# Patient Record
Sex: MALE | Race: WHITE | Employment: UNEMPLOYED | URBAN - METROPOLITAN AREA
[De-identification: names, ages, dates, MRNs, and addresses within clinical notes are randomized per-mention and may not be internally consistent; named-entity substitution may affect disease eponyms.]

---

## 2017-03-29 ENCOUNTER — HOSPITAL ENCOUNTER (EMERGENCY)
Age: 62
Discharge: HOME | End: 2017-03-29
Attending: EMERGENCY MEDICINE
Payer: MEDICARE

## 2017-03-29 ENCOUNTER — APPOINTMENT (OUTPATIENT)
Dept: CT IMAGING | Age: 62
End: 2017-03-29
Attending: EMERGENCY MEDICINE
Payer: MEDICARE

## 2017-03-29 VITALS
TEMPERATURE: 98.5 F | WEIGHT: 232 LBS | RESPIRATION RATE: 16 BRPM | SYSTOLIC BLOOD PRESSURE: 143 MMHG | DIASTOLIC BLOOD PRESSURE: 94 MMHG | BODY MASS INDEX: 31.42 KG/M2 | HEIGHT: 72 IN | HEART RATE: 74 BPM | OXYGEN SATURATION: 96 %

## 2017-03-29 DIAGNOSIS — S29.019A THORACIC MYOFASCIAL STRAIN, INITIAL ENCOUNTER: Primary | ICD-10-CM

## 2017-03-29 LAB
ALBUMIN SERPL BCP-MCNC: 4.1 G/DL (ref 3.5–5)
ALBUMIN/GLOB SERPL: 1.1 {RATIO} (ref 1.2–3.5)
ALP SERPL-CCNC: 163 U/L (ref 50–136)
ALT SERPL-CCNC: 66 U/L (ref 12–65)
ANION GAP BLD CALC-SCNC: 11 MMOL/L (ref 7–16)
AST SERPL W P-5'-P-CCNC: 25 U/L (ref 15–37)
BACTERIA URNS QL MICRO: 0 /HPF
BILIRUB SERPL-MCNC: 0.7 MG/DL (ref 0.2–1.1)
BUN SERPL-MCNC: 14 MG/DL (ref 6–23)
CALCIUM SERPL-MCNC: 9.2 MG/DL (ref 8.3–10.4)
CASTS URNS QL MICRO: NORMAL /LPF
CHLORIDE SERPL-SCNC: 106 MMOL/L (ref 98–107)
CO2 SERPL-SCNC: 23 MMOL/L (ref 21–32)
CREAT SERPL-MCNC: 1.06 MG/DL (ref 0.8–1.5)
EPI CELLS #/AREA URNS HPF: NORMAL /HPF
ERYTHROCYTE [DISTWIDTH] IN BLOOD BY AUTOMATED COUNT: 12.8 % (ref 11.9–14.6)
GLOBULIN SER CALC-MCNC: 3.8 G/DL (ref 2.3–3.5)
GLUCOSE SERPL-MCNC: 120 MG/DL (ref 65–100)
HCT VFR BLD AUTO: 51.3 % (ref 41.1–50.3)
HGB BLD-MCNC: 18 G/DL (ref 13.6–17.2)
LIPASE SERPL-CCNC: 153 U/L (ref 73–393)
MCH RBC QN AUTO: 29.4 PG (ref 26.1–32.9)
MCHC RBC AUTO-ENTMCNC: 35.1 G/DL (ref 31.4–35)
MCV RBC AUTO: 83.7 FL (ref 79.6–97.8)
PLATELET # BLD AUTO: 183 K/UL (ref 150–450)
PMV BLD AUTO: 9.2 FL (ref 10.8–14.1)
POTASSIUM SERPL-SCNC: 4.2 MMOL/L (ref 3.5–5.1)
PROT SERPL-MCNC: 7.9 G/DL (ref 6.3–8.2)
RBC # BLD AUTO: 6.13 M/UL (ref 4.23–5.67)
RBC #/AREA URNS HPF: NORMAL /HPF
SODIUM SERPL-SCNC: 140 MMOL/L (ref 136–145)
WBC # BLD AUTO: 9.3 K/UL (ref 4.3–11.1)
WBC URNS QL MICRO: NORMAL /HPF

## 2017-03-29 PROCEDURE — 80053 COMPREHEN METABOLIC PANEL: CPT | Performed by: EMERGENCY MEDICINE

## 2017-03-29 PROCEDURE — 96361 HYDRATE IV INFUSION ADD-ON: CPT | Performed by: EMERGENCY MEDICINE

## 2017-03-29 PROCEDURE — 96374 THER/PROPH/DIAG INJ IV PUSH: CPT | Performed by: EMERGENCY MEDICINE

## 2017-03-29 PROCEDURE — 81015 MICROSCOPIC EXAM OF URINE: CPT | Performed by: EMERGENCY MEDICINE

## 2017-03-29 PROCEDURE — 74011636320 HC RX REV CODE- 636/320: Performed by: EMERGENCY MEDICINE

## 2017-03-29 PROCEDURE — 96375 TX/PRO/DX INJ NEW DRUG ADDON: CPT | Performed by: EMERGENCY MEDICINE

## 2017-03-29 PROCEDURE — 85027 COMPLETE CBC AUTOMATED: CPT | Performed by: EMERGENCY MEDICINE

## 2017-03-29 PROCEDURE — 99284 EMERGENCY DEPT VISIT MOD MDM: CPT | Performed by: EMERGENCY MEDICINE

## 2017-03-29 PROCEDURE — 83690 ASSAY OF LIPASE: CPT | Performed by: EMERGENCY MEDICINE

## 2017-03-29 PROCEDURE — 74011000258 HC RX REV CODE- 258: Performed by: EMERGENCY MEDICINE

## 2017-03-29 PROCEDURE — 74011250636 HC RX REV CODE- 250/636: Performed by: EMERGENCY MEDICINE

## 2017-03-29 PROCEDURE — 81003 URINALYSIS AUTO W/O SCOPE: CPT | Performed by: EMERGENCY MEDICINE

## 2017-03-29 PROCEDURE — 74177 CT ABD & PELVIS W/CONTRAST: CPT

## 2017-03-29 RX ORDER — ONDANSETRON 2 MG/ML
4 INJECTION INTRAMUSCULAR; INTRAVENOUS
Status: COMPLETED | OUTPATIENT
Start: 2017-03-29 | End: 2017-03-29

## 2017-03-29 RX ORDER — HYDROMORPHONE HYDROCHLORIDE 1 MG/ML
1 INJECTION, SOLUTION INTRAMUSCULAR; INTRAVENOUS; SUBCUTANEOUS
Status: COMPLETED | OUTPATIENT
Start: 2017-03-29 | End: 2017-03-29

## 2017-03-29 RX ORDER — SODIUM CHLORIDE 0.9 % (FLUSH) 0.9 %
10 SYRINGE (ML) INJECTION
Status: COMPLETED | OUTPATIENT
Start: 2017-03-29 | End: 2017-03-29

## 2017-03-29 RX ADMIN — ONDANSETRON 4 MG: 2 INJECTION INTRAMUSCULAR; INTRAVENOUS at 12:06

## 2017-03-29 RX ADMIN — HYDROMORPHONE HYDROCHLORIDE 1 MG: 1 INJECTION, SOLUTION INTRAMUSCULAR; INTRAVENOUS; SUBCUTANEOUS at 12:03

## 2017-03-29 RX ADMIN — SODIUM CHLORIDE 100 ML: 900 INJECTION, SOLUTION INTRAVENOUS at 12:12

## 2017-03-29 RX ADMIN — SODIUM CHLORIDE 1000 ML: 900 INJECTION, SOLUTION INTRAVENOUS at 10:45

## 2017-03-29 RX ADMIN — IOPAMIDOL 100 ML: 755 INJECTION, SOLUTION INTRAVENOUS at 12:12

## 2017-03-29 RX ADMIN — Medication 10 ML: at 12:12

## 2017-03-29 NOTE — PROGRESS NOTES
Please call [email or web address] with creatnine result & once  IV access is obtained so that we may proceed with scan.

## 2017-03-29 NOTE — ED NOTES
Pt awaiting CT exam at this time pt continues to complain of 10/10 pain requesting pain meds at this time.  Will notify md

## 2017-03-29 NOTE — ED NOTES
Pt returned from CT states improvement of pain. Pt states pain is now 3/10 at present time. PT  bp is 143/87 76  20  95% on room air.  Awaiting CT Results at this time

## 2017-03-29 NOTE — ED TRIAGE NOTES
Pt reports that 2 weeks ago he had a horse go down and he was pulling on her trying to get her up. After that incident the pt started to have lower right flank pain. Pt denies any difficulty with urination and defecation.

## 2017-03-29 NOTE — ED NOTES
Pt medicated per md order for pain 10/10. Pt to CT after medication administration pt on monitor bp 168/102 pulse 86 02 sat 94% on room air.

## 2018-07-19 ENCOUNTER — HOSPITAL ENCOUNTER (OUTPATIENT)
Dept: LAB | Age: 63
Discharge: HOME | End: 2018-07-19
Payer: MEDICARE

## 2018-07-19 DIAGNOSIS — I25.10 CORONARY ARTERY DISEASE INVOLVING NATIVE CORONARY ARTERY OF NATIVE HEART WITHOUT ANGINA PECTORIS: Chronic | ICD-10-CM

## 2018-07-19 DIAGNOSIS — E78.5 HYPERLIPIDEMIA, UNSPECIFIED HYPERLIPIDEMIA TYPE: Chronic | ICD-10-CM

## 2018-07-19 LAB
ALBUMIN SERPL-MCNC: 3.9 G/DL (ref 3.2–4.6)
ALBUMIN/GLOB SERPL: 1.1 {RATIO}
ALP SERPL-CCNC: 112 U/L (ref 50–136)
ALT SERPL-CCNC: 33 U/L (ref 12–65)
AST SERPL-CCNC: 18 U/L (ref 15–37)
BILIRUB DIRECT SERPL-MCNC: <0.1 MG/DL
BILIRUB SERPL-MCNC: 0.4 MG/DL (ref 0.2–1.1)
CHOLEST SERPL-MCNC: 286 MG/DL
GLOBULIN SER CALC-MCNC: 3.4 G/DL
HDLC SERPL-MCNC: 21 MG/DL (ref 40–60)
HDLC SERPL: 13.6 {RATIO}
LDLC SERPL CALC-MCNC: ABNORMAL MG/DL
LDLC SERPL DIRECT ASSAY-MCNC: 114 MG/DL
LIPID PROFILE,FLP: ABNORMAL
PROT SERPL-MCNC: 7.3 G/DL (ref 6.3–8.2)
TRIGL SERPL-MCNC: >400 MG/DL (ref 35–150)
VLDLC SERPL CALC-MCNC: ABNORMAL MG/DL (ref 6–23)

## 2018-07-19 PROCEDURE — 83721 ASSAY OF BLOOD LIPOPROTEIN: CPT | Performed by: INTERNAL MEDICINE

## 2018-07-19 PROCEDURE — 80061 LIPID PANEL: CPT | Performed by: INTERNAL MEDICINE

## 2018-07-19 PROCEDURE — 80076 HEPATIC FUNCTION PANEL: CPT | Performed by: INTERNAL MEDICINE

## 2018-07-19 PROCEDURE — 36415 COLL VENOUS BLD VENIPUNCTURE: CPT | Performed by: INTERNAL MEDICINE

## 2018-08-06 ENCOUNTER — APPOINTMENT (OUTPATIENT)
Dept: CT IMAGING | Age: 63
End: 2018-08-06
Attending: EMERGENCY MEDICINE
Payer: MEDICARE

## 2018-08-06 ENCOUNTER — HOSPITAL ENCOUNTER (EMERGENCY)
Age: 63
Discharge: HOME | End: 2018-08-06
Attending: EMERGENCY MEDICINE
Payer: MEDICARE

## 2018-08-06 VITALS
TEMPERATURE: 97.9 F | SYSTOLIC BLOOD PRESSURE: 138 MMHG | BODY MASS INDEX: 30.88 KG/M2 | WEIGHT: 228 LBS | HEART RATE: 68 BPM | DIASTOLIC BLOOD PRESSURE: 90 MMHG | RESPIRATION RATE: 26 BRPM | HEIGHT: 72 IN | OXYGEN SATURATION: 87 %

## 2018-08-06 DIAGNOSIS — N20.0 KIDNEY STONE: Primary | ICD-10-CM

## 2018-08-06 LAB
ALBUMIN SERPL-MCNC: 3.7 G/DL (ref 3.2–4.6)
ALBUMIN/GLOB SERPL: 0.9 {RATIO} (ref 1.2–3.5)
ALP SERPL-CCNC: 128 U/L (ref 50–136)
ALT SERPL-CCNC: 52 U/L (ref 12–65)
ANION GAP SERPL CALC-SCNC: 10 MMOL/L (ref 7–16)
AST SERPL-CCNC: 50 U/L (ref 15–37)
BACTERIA URNS QL MICRO: 0 /HPF
BASOPHILS # BLD: 0 K/UL (ref 0–0.2)
BASOPHILS NFR BLD: 1 % (ref 0–2)
BILIRUB SERPL-MCNC: 0.4 MG/DL (ref 0.2–1.1)
BUN SERPL-MCNC: 16 MG/DL (ref 8–23)
CALCIUM SERPL-MCNC: 9.1 MG/DL (ref 8.3–10.4)
CASTS URNS QL MICRO: 0 /LPF
CHLORIDE SERPL-SCNC: 109 MMOL/L (ref 98–107)
CO2 SERPL-SCNC: 25 MMOL/L (ref 21–32)
CREAT SERPL-MCNC: 1.29 MG/DL (ref 0.8–1.5)
CRYSTALS URNS QL MICRO: 0 /LPF
DIFFERENTIAL METHOD BLD: ABNORMAL
EOSINOPHIL # BLD: 0.3 K/UL (ref 0–0.8)
EOSINOPHIL NFR BLD: 4 % (ref 0.5–7.8)
EPI CELLS #/AREA URNS HPF: NORMAL /HPF
ERYTHROCYTE [DISTWIDTH] IN BLOOD BY AUTOMATED COUNT: 13 % (ref 11.9–14.6)
GLOBULIN SER CALC-MCNC: 4 G/DL (ref 2.3–3.5)
GLUCOSE SERPL-MCNC: 121 MG/DL (ref 65–100)
HCT VFR BLD AUTO: 49.7 % (ref 41.1–50.3)
HGB BLD-MCNC: 17.5 G/DL (ref 13.6–17.2)
IMM GRANULOCYTES # BLD: 0 K/UL (ref 0–0.5)
IMM GRANULOCYTES NFR BLD AUTO: 0 % (ref 0–5)
LYMPHOCYTES # BLD: 2.2 K/UL (ref 0.5–4.6)
LYMPHOCYTES NFR BLD: 28 % (ref 13–44)
MCH RBC QN AUTO: 30.6 PG (ref 26.1–32.9)
MCHC RBC AUTO-ENTMCNC: 35.2 G/DL (ref 31.4–35)
MCV RBC AUTO: 86.9 FL (ref 79.6–97.8)
MONOCYTES # BLD: 0.4 K/UL (ref 0.1–1.3)
MONOCYTES NFR BLD: 6 % (ref 4–12)
MUCOUS THREADS URNS QL MICRO: 0 /LPF
NEUTS SEG # BLD: 4.9 K/UL (ref 1.7–8.2)
NEUTS SEG NFR BLD: 61 % (ref 43–78)
OTHER OBSERVATIONS,UCOM: NORMAL
PLATELET # BLD AUTO: 189 K/UL (ref 150–450)
PMV BLD AUTO: 9.5 FL (ref 10.8–14.1)
POTASSIUM SERPL-SCNC: 4.7 MMOL/L (ref 3.5–5.1)
PROT SERPL-MCNC: 7.7 G/DL (ref 6.3–8.2)
RBC # BLD AUTO: 5.72 M/UL (ref 4.23–5.67)
RBC #/AREA URNS HPF: >100 /HPF
SODIUM SERPL-SCNC: 144 MMOL/L (ref 136–145)
WBC # BLD AUTO: 7.8 K/UL (ref 4.3–11.1)
WBC URNS QL MICRO: NORMAL /HPF

## 2018-08-06 PROCEDURE — 80053 COMPREHEN METABOLIC PANEL: CPT

## 2018-08-06 PROCEDURE — 74176 CT ABD & PELVIS W/O CONTRAST: CPT

## 2018-08-06 PROCEDURE — 96374 THER/PROPH/DIAG INJ IV PUSH: CPT | Performed by: EMERGENCY MEDICINE

## 2018-08-06 PROCEDURE — 74011250636 HC RX REV CODE- 250/636: Performed by: EMERGENCY MEDICINE

## 2018-08-06 PROCEDURE — 81015 MICROSCOPIC EXAM OF URINE: CPT

## 2018-08-06 PROCEDURE — 99284 EMERGENCY DEPT VISIT MOD MDM: CPT | Performed by: EMERGENCY MEDICINE

## 2018-08-06 PROCEDURE — 96375 TX/PRO/DX INJ NEW DRUG ADDON: CPT | Performed by: EMERGENCY MEDICINE

## 2018-08-06 PROCEDURE — 85025 COMPLETE CBC W/AUTO DIFF WBC: CPT

## 2018-08-06 RX ORDER — MORPHINE SULFATE 2 MG/ML
2 INJECTION, SOLUTION INTRAMUSCULAR; INTRAVENOUS
Status: COMPLETED | OUTPATIENT
Start: 2018-08-06 | End: 2018-08-06

## 2018-08-06 RX ORDER — HYDROMORPHONE HYDROCHLORIDE 2 MG/ML
1 INJECTION, SOLUTION INTRAMUSCULAR; INTRAVENOUS; SUBCUTANEOUS ONCE
Status: COMPLETED | OUTPATIENT
Start: 2018-08-06 | End: 2018-08-06

## 2018-08-06 RX ORDER — ONDANSETRON 2 MG/ML
4 INJECTION INTRAMUSCULAR; INTRAVENOUS
Status: COMPLETED | OUTPATIENT
Start: 2018-08-06 | End: 2018-08-06

## 2018-08-06 RX ORDER — ONDANSETRON 4 MG/1
4 TABLET, FILM COATED ORAL
Qty: 12 TAB | Refills: 0 | Status: SHIPPED | OUTPATIENT
Start: 2018-08-06 | End: 2018-09-07

## 2018-08-06 RX ORDER — KETOROLAC TROMETHAMINE 30 MG/ML
15 INJECTION, SOLUTION INTRAMUSCULAR; INTRAVENOUS
Status: COMPLETED | OUTPATIENT
Start: 2018-08-06 | End: 2018-08-06

## 2018-08-06 RX ADMIN — MORPHINE SULFATE 2 MG: 2 INJECTION, SOLUTION INTRAMUSCULAR; INTRAVENOUS at 19:37

## 2018-08-06 RX ADMIN — KETOROLAC TROMETHAMINE 15 MG: 30 INJECTION, SOLUTION INTRAMUSCULAR at 19:37

## 2018-08-06 RX ADMIN — HYDROMORPHONE HYDROCHLORIDE 1 MG: 2 INJECTION, SOLUTION INTRAMUSCULAR; INTRAVENOUS; SUBCUTANEOUS at 20:25

## 2018-08-06 RX ADMIN — ONDANSETRON 4 MG: 2 INJECTION INTRAMUSCULAR; INTRAVENOUS at 19:37

## 2018-08-06 NOTE — ED TRIAGE NOTES
Pt states having sudden onset of lower back, left flank pain and blood in his urine that started 3 hours ago.

## 2018-08-07 NOTE — DISCHARGE INSTRUCTIONS
Kidney Stone: Care Instructions  Your Care Instructions    Kidney stones are formed when salts, minerals, and other substances normally found in the urine clump together. They can be as small as grains of sand or, rarely, as large as golf balls. While the stone is traveling through the ureter, which is the tube that carries urine from the kidney to the bladder, you will probably feel pain. The pain may be mild or very severe. You may also have some blood in your urine. As soon as the stone reaches the bladder, any intense pain should go away. If a stone is too large to pass on its own, you may need a medical procedure to help you pass the stone. The doctor has checked you carefully, but problems can develop later. If you notice any problems or new symptoms, get medical treatment right away. Follow-up care is a key part of your treatment and safety. Be sure to make and go to all appointments, and call your doctor if you are having problems. It's also a good idea to know your test results and keep a list of the medicines you take. How can you care for yourself at home? · Drink plenty of fluids, enough so that your urine is light yellow or clear like water. If you have kidney, heart, or liver disease and have to limit fluids, talk with your doctor before you increase the amount of fluids you drink. · Take pain medicines exactly as directed. Call your doctor if you think you are having a problem with your medicine. ¨ If the doctor gave you a prescription medicine for pain, take it as prescribed. ¨ If you are not taking a prescription pain medicine, ask your doctor if you can take an over-the-counter medicine. Read and follow all instructions on the label. · Your doctor may ask you to strain your urine so that you can collect your kidney stone when it passes. You can use a kitchen strainer or a tea strainer to catch the stone. Store it in a plastic bag until you see your doctor again.   Preventing future kidney stones  Some changes in your diet may help prevent kidney stones. Depending on the cause of your stones, your doctor may recommend that you:  · Drink plenty of fluids, enough so that your urine is light yellow or clear like water. If you have kidney, heart, or liver disease and have to limit fluids, talk with your doctor before you increase the amount of fluids you drink. · Limit coffee, tea, and alcohol. Also avoid grapefruit juice. · Do not take more than the recommended daily dose of vitamins C and D.  · Avoid antacids such as Gaviscon, Maalox, Mylanta, or Tums. · Limit the amount of salt (sodium) in your diet. · Eat a balanced diet that is not too high in protein. · Limit foods that are high in a substance called oxalate, which can cause kidney stones. These foods include dark green vegetables, rhubarb, chocolate, wheat bran, nuts, cranberries, and beans. When should you call for help? Call your doctor now or seek immediate medical care if:    · You cannot keep down fluids.     · Your pain gets worse.     · You have a fever or chills.     · You have new or worse pain in your back just below your rib cage (the flank area).     · You have new or more blood in your urine.    Watch closely for changes in your health, and be sure to contact your doctor if:    · You do not get better as expected. Where can you learn more? Go to http://elda-brea.info/. Enter Z843 in the search box to learn more about \"Kidney Stone: Care Instructions. \"  Current as of: May 12, 2017  Content Version: 11.7  © 0492-9825 [employer]. Care instructions adapted under license by [employer] (which disclaims liability or warranty for this information). If you have questions about a medical condition or this instruction, always ask your healthcare professional. [address] any warranty or liability for your use of this information.          Learning About Diet for Kidney Stone Prevention  What are kidney stones? Kidney stones are made of salts and minerals in the urine that form small \"elisa. \" Stones can form in the kidneys and the ureters (the tubes that lead from the kidneys to the bladder). They can also form in the bladder. Stones may not cause a problem as long as they stay in the kidneys. But they can cause sudden, severe pain. Pain is most likely when the stones travel from the kidneys to the bladder. Kidney stones can cause bloody urine. Kidney stones often run in families. You are more likely to get them if you don't drink enough fluids, mainly water. Certain foods and drinks and some dietary supplements may also increase your risk for kidney stones if you consume too much of them. What can you do to prevent kidney stones? Changing what you eat may not prevent all types of kidney stones. But for people who have a history of certain kinds of kidney stones, some changes in diet may help. A dietitian can help you set up a meal plan that includes healthy, low-oxalate choices. Here are some general guidelines to get you started. Plan your meals and snacks around foods that are low in oxalate. These foods include:  · Corn, kale, parsnips, and squash,. · Beef, chicken, pork, turkey, and fish. · Milk, butter, cheese, and yogurt. You can eat certain foods that are medium-high in oxalate, but eat them only once in a while. These foods include:  · Bread. · Brown rice. · English muffins. · Figs. · Popcorn. · String beans. · Tomatoes. Limit very high-oxalate foods, including:  · Black tea. · Coffee. · Chocolate. · Dark green vegetables. · Nuts. Here are some other things you can do to help prevent kidney stones. · Drink plenty of fluids. If you have kidney, heart, or liver disease and have to limit fluids, talk with your doctor before you increase the amount of fluids you drink.   · Do not take more than the recommended daily dose of vitamins C and D.  · Limit the salt in your diet. · Eat a balanced diet that is not too high in protein. Follow-up care is a key part of your treatment and safety. Be sure to make and go to all appointments, and call your doctor if you are having problems. It's also a good idea to know your test results and keep a list of the medicines you take. Where can you learn more? Go to http://elda-brea.info/. Enter C138 in the search box to learn more about \"Learning About Diet for Kidney Stone Prevention. \"  Current as of: May 12, 2017  Content Version: 11.7  © 5207-1793 [employer]. Care instructions adapted under license by [employer] (which disclaims liability or warranty for this information). If you have questions about a medical condition or this instruction, always ask your healthcare professional. [address] any warranty or liability for your use of this information.

## 2018-08-07 NOTE — ED NOTES
I have reviewed discharge instructions with the patient. The patient verbalized understanding. Patient left ED via Discharge Method: ambulatory to Home with family. Opportunity for questions and clarification provided. Patient given 1 scripts. To continue your aftercare when you leave the hospital, you may receive an automated call from our care team to check in on how you are doing. This is a free service and part of our promise to provide the best care and service to meet your aftercare needs.  If you have questions, or wish to unsubscribe from this service please call [phone]. Thank you for Choosing our [facility].

## 2018-08-08 ENCOUNTER — ANESTHESIA EVENT (OUTPATIENT)
Dept: SURGERY | Age: 63
End: 2018-08-08
Payer: MEDICARE

## 2018-08-08 ENCOUNTER — HOSPITAL ENCOUNTER (OUTPATIENT)
Age: 63
Setting detail: OBSERVATION
LOS: 1 days | Discharge: HOME | End: 2018-08-10
Attending: UROLOGY | Admitting: UROLOGY
Payer: MEDICARE

## 2018-08-08 PROBLEM — N20.1 LEFT URETERAL STONE: Status: ACTIVE | Noted: 2018-08-08

## 2018-08-08 PROCEDURE — 96366 THER/PROPH/DIAG IV INF ADDON: CPT

## 2018-08-08 PROCEDURE — 74011250637 HC RX REV CODE- 250/637: Performed by: NURSE PRACTITIONER

## 2018-08-08 PROCEDURE — 96376 TX/PRO/DX INJ SAME DRUG ADON: CPT

## 2018-08-08 PROCEDURE — 77030020245 HC SOL INJ 5% D/0.9%NACL

## 2018-08-08 PROCEDURE — 77030019938 HC TBNG IV PCA ICUM -A

## 2018-08-08 PROCEDURE — 74011250637 HC RX REV CODE- 250/637: Performed by: UROLOGY

## 2018-08-08 PROCEDURE — 77030020253 HC SOL INJ D545NS .05 DEX .45 SAL

## 2018-08-08 PROCEDURE — 65270000029 HC RM PRIVATE

## 2018-08-08 PROCEDURE — 77030032490 HC SLV COMPR SCD KNE COVD -B

## 2018-08-08 PROCEDURE — 74011250636 HC RX REV CODE- 250/636: Performed by: UROLOGY

## 2018-08-08 PROCEDURE — 99218 HC RM OBSERVATION: CPT

## 2018-08-08 PROCEDURE — 96365 THER/PROPH/DIAG IV INF INIT: CPT

## 2018-08-08 PROCEDURE — 74011250636 HC RX REV CODE- 250/636: Performed by: NURSE PRACTITIONER

## 2018-08-08 PROCEDURE — 96375 TX/PRO/DX INJ NEW DRUG ADDON: CPT

## 2018-08-08 RX ORDER — SODIUM CHLORIDE 0.9 % (FLUSH) 0.9 %
5-10 SYRINGE (ML) INJECTION EVERY 8 HOURS
Status: DISCONTINUED | OUTPATIENT
Start: 2018-08-08 | End: 2018-08-10 | Stop reason: HOSPADM

## 2018-08-08 RX ORDER — SIMETHICONE 80 MG
80 TABLET,CHEWABLE ORAL
Status: DISCONTINUED | OUTPATIENT
Start: 2018-08-08 | End: 2018-08-10 | Stop reason: HOSPADM

## 2018-08-08 RX ORDER — ACETAMINOPHEN 325 MG/1
650 TABLET ORAL
Status: DISCONTINUED | OUTPATIENT
Start: 2018-08-08 | End: 2018-08-10 | Stop reason: HOSPADM

## 2018-08-08 RX ORDER — TAMSULOSIN HYDROCHLORIDE 0.4 MG/1
0.4 CAPSULE ORAL DAILY
Status: DISCONTINUED | OUTPATIENT
Start: 2018-08-08 | End: 2018-08-10 | Stop reason: HOSPADM

## 2018-08-08 RX ORDER — SODIUM CHLORIDE 9 MG/ML
125 INJECTION, SOLUTION INTRAVENOUS CONTINUOUS
Status: DISCONTINUED | OUTPATIENT
Start: 2018-08-08 | End: 2018-08-10 | Stop reason: HOSPADM

## 2018-08-08 RX ORDER — HYDROCODONE BITARTRATE AND ACETAMINOPHEN 5; 325 MG/1; MG/1
1 TABLET ORAL
Status: DISCONTINUED | OUTPATIENT
Start: 2018-08-08 | End: 2018-08-08

## 2018-08-08 RX ORDER — HYDROMORPHONE HYDROCHLORIDE 2 MG/ML
1 INJECTION, SOLUTION INTRAMUSCULAR; INTRAVENOUS; SUBCUTANEOUS
Status: DISCONTINUED | OUTPATIENT
Start: 2018-08-08 | End: 2018-08-08

## 2018-08-08 RX ORDER — DIPHENHYDRAMINE HCL 25 MG
25 CAPSULE ORAL
Status: DISCONTINUED | OUTPATIENT
Start: 2018-08-08 | End: 2018-08-10 | Stop reason: HOSPADM

## 2018-08-08 RX ORDER — MORPHINE SULFATE 5 MG/ML
INJECTION, SOLUTION INTRAVENOUS CONTINUOUS
Status: DISCONTINUED | OUTPATIENT
Start: 2018-08-08 | End: 2018-08-10

## 2018-08-08 RX ORDER — CEFAZOLIN SODIUM/WATER 2 G/20 ML
2 SYRINGE (ML) INTRAVENOUS
Status: COMPLETED | OUTPATIENT
Start: 2018-08-09 | End: 2018-08-09

## 2018-08-08 RX ORDER — NALOXONE HYDROCHLORIDE 0.4 MG/ML
0.4 INJECTION, SOLUTION INTRAMUSCULAR; INTRAVENOUS; SUBCUTANEOUS AS NEEDED
Status: DISCONTINUED | OUTPATIENT
Start: 2018-08-08 | End: 2018-08-10 | Stop reason: HOSPADM

## 2018-08-08 RX ORDER — ONDANSETRON 2 MG/ML
4 INJECTION INTRAMUSCULAR; INTRAVENOUS
Status: DISCONTINUED | OUTPATIENT
Start: 2018-08-08 | End: 2018-08-10 | Stop reason: HOSPADM

## 2018-08-08 RX ORDER — SODIUM CHLORIDE 0.9 % (FLUSH) 0.9 %
5-10 SYRINGE (ML) INJECTION AS NEEDED
Status: DISCONTINUED | OUTPATIENT
Start: 2018-08-08 | End: 2018-08-10 | Stop reason: HOSPADM

## 2018-08-08 RX ADMIN — ONDANSETRON 4 MG: 2 INJECTION, SOLUTION INTRAMUSCULAR; INTRAVENOUS at 12:32

## 2018-08-08 RX ADMIN — SIMETHICONE CHEW TAB 80 MG 80 MG: 80 TABLET ORAL at 20:53

## 2018-08-08 RX ADMIN — ONDANSETRON 4 MG: 2 INJECTION, SOLUTION INTRAMUSCULAR; INTRAVENOUS at 18:28

## 2018-08-08 RX ADMIN — SODIUM CHLORIDE 125 ML/HR: 900 INJECTION, SOLUTION INTRAVENOUS at 19:24

## 2018-08-08 RX ADMIN — TAMSULOSIN HYDROCHLORIDE 0.4 MG: 0.4 CAPSULE ORAL at 12:32

## 2018-08-08 RX ADMIN — HYDROMORPHONE HYDROCHLORIDE 1 MG: 2 INJECTION, SOLUTION INTRAMUSCULAR; INTRAVENOUS; SUBCUTANEOUS at 12:29

## 2018-08-08 RX ADMIN — MORPHINE SULFATE: 5 INJECTION, SOLUTION INTRAVENOUS at 19:39

## 2018-08-08 RX ADMIN — Medication 10 ML: at 16:18

## 2018-08-08 RX ADMIN — HYDROCODONE BITARTRATE AND ACETAMINOPHEN 1 TABLET: 5; 325 TABLET ORAL at 18:28

## 2018-08-08 RX ADMIN — HYDROMORPHONE HYDROCHLORIDE 1 MG: 2 INJECTION, SOLUTION INTRAMUSCULAR; INTRAVENOUS; SUBCUTANEOUS at 16:37

## 2018-08-08 RX ADMIN — SODIUM CHLORIDE 125 ML/HR: 900 INJECTION, SOLUTION INTRAVENOUS at 11:03

## 2018-08-08 RX ADMIN — Medication 10 ML: at 19:45

## 2018-08-08 RX ADMIN — HYDROMORPHONE HYDROCHLORIDE 1 MG: 2 INJECTION, SOLUTION INTRAMUSCULAR; INTRAVENOUS; SUBCUTANEOUS at 11:19

## 2018-08-08 RX ADMIN — HYDROCODONE BITARTRATE AND ACETAMINOPHEN 1 TABLET: 5; 325 TABLET ORAL at 11:03

## 2018-08-08 NOTE — IP AVS SNAPSHOT
[address] 
[phone] Patient: Daniel Borges MRN: [id number] About your hospitalization You were admitted on:  August 8, 2018 You last received care in the:  [facility] 6 MED SURG You were discharged on:  August 10, 2018 Why you were hospitalized Your primary diagnosis was:  Not on File Your diagnoses also included:  Left Ureteral Stone Follow-up Information Follow up With Details Comments Contact Info Annette Hayes MD   [address] [facility] [address] 
[phone] Izabel Gomez MD  office will call patient with appointment date and time. [facility] [address] [address] 
[phone] Your Scheduled Appointments Monday August 20, 2018 10:00 AM EDT Aorta and Arterial Ultrasound with [facility] ULTR[facility]OUND [facility] ([facility]) [address] [address] [phone] Monday September 10, 2018  9:45 AM EDT  
ESTABLISHED PATIENT with [facility]SA[facility] PROVIDER [facility] ([facility]) [address] [address] [phone] Discharge Orders None A check luz indicates which time of day the medication should be taken. My Medications START taking these medications Instructions Each Dose to Equal  
 Morning Noon Evening Bedtime  
 phenazopyridine 100 mg tablet Commonly known as:  PYRIDIUM Your next dose is:  As needed Take 1 Tab by mouth three (3) times daily as needed for Pain for up to 3 days. 100 mg CHANGE how you take these medications Instructions Each Dose to Equal  
 Morning Noon Evening Bedtime HYDROcodone-acetaminophen  mg tablet Commonly known as:  Florence Geiger  
 What changed:  Another medication with the same name was removed. Continue taking this medication, and follow the directions you see here. Your last dose was: As needed Take 1 Tab by mouth every four (4) hours as needed. Max Daily Amount: 6 Tabs. 1 Tab CONTINUE taking these medications Instructions Each Dose to Equal  
 Morning Noon Evening Bedtime  
 acetaminophen 500 mg tablet Commonly known as:  TYLENOL Your next dose is:  As needed Take  by mouth every six (6) hours as needed for Pain. Allergy 25 mg capsule Generic drug:  diphenhydrAMINE Your next dose is:  As needed Take 25 mg by mouth every six (6) hours as needed. 25 mg  
    
   
   
   
  
 aspirin delayed-release 81 mg tablet Your next dose is:  8-11 Take  by mouth daily. atorvastatin 80 mg tablet Commonly known as:  LIPITOR Your next dose is:  bedtime Take 1 Tab by mouth daily. 80 mg  
    
   
   
   
  
  
 carvedilol 6.25 mg tablet Commonly known as:  Chris Liter Your next dose is: This evening Take 1 Tab by mouth two (2) times daily (with meals). 6.25 mg  
    
   
   
  
   
  
 clopidogrel 75 mg Tab Commonly known as:  PLAVIX Your next dose is:  8-11 Take 1 Tab by mouth daily. 75 mg  
    
   
   
   
  
 diazePAM 5 mg tablet Commonly known as:  VALIUM Your next dose is:  As needed Take 5 mg by mouth every twelve (12) hours as needed. 5 mg  
    
   
   
   
  
 dicyclomine 10 mg capsule Commonly known as:  BENTYL Your next dose is:  As needed  
   
      
   
   
   
  
 lisinopril 5 mg tablet Commonly known as:  Merilynn Reinaldo Your next dose is:  8-11 Take  by mouth daily. NARCAN 4 mg/actuation nasal spray Generic drug:  naloxone Your next dose is:  As needed NITROSTAT 0.4 mg SL tablet Generic drug:  nitroglycerin Your next dose is:  As needed  
   
      
   
   
   
  
 omeprazole 40 mg capsule Commonly known as:  PRILOSEC Your next dose is:  8-11 Take 40 mg by mouth daily. 40 mg  
    
   
   
   
  
 ondansetron hcl 4 mg tablet Commonly known as:  Negrita [place] Your next dose is:  As needed Take 1 Tab by mouth every eight (8) hours as needed for Nausea. 4 mg  
    
   
   
   
  
 pravastatin 20 mg tablet Commonly known as:  PRAVACHOL Your next dose is:  As directed  
   
      
   
   
   
  
 zolpidem 10 mg tablet Commonly known as:  AMBIEN Your next dose is:  As needed Take 10 mg by mouth nightly as needed. 10 mg Where to Get Your Medications Information on where to get these meds will be given to you by the nurse or doctor. ! Ask your nurse or doctor about these medications  
  phenazopyridine 100 mg tablet Opioid Education Prescription Opioids: What You Need to Know: 
 
Prescription opioids can be used to help relieve moderate-to-severe pain and are often prescribed following a surgery or injury, or for certain health conditions. These medications can be an important part of treatment but also come with serious risks. Opioids are strong pain medicines. Examples include hydrocodone, oxycodone, fentanyl, and morphine. Heroin is an example of an illegal opioid. It is important to work with your health care provider to make sure you are getting the safest, most effective care. WHAT ARE THE RISKS AND SIDE EFFECTS OF OPIOID USE? Prescription opioids carry serious risks of addiction and overdose, especially with prolonged use. An opioid overdose, often marked by slow breathing, can cause sudden death. The use of prescription opioids can have a number of side effects as well, even when taken as directed.  
  
· Tolerance-meaning you might need to take more of a medication for the same pain relief · Physical dependence-meaning you have symptoms of withdrawal when the medication is stopped. Withdrawal symptoms can include nausea, sweating, chills, diarrhea, stomach cramps, and muscle aches. Withdrawal can last up to several weeks, depending on which drug you took and how long you took it. · Increased sensitivity to pain · Constipation · Nausea, vomiting, and dry mouth · Sleepiness and dizziness · Confusion · Depression · Low levels of testosterone that can result in lower sex drive, energy, and strength · Itching and sweating RISKS ARE GREATER WITH:      
· History of drug misuse, substance use disorder, or overdose · Mental health conditions (such as depression or anxiety) · Sleep apnea · Older age (72 years or older) · Pregnancy Avoid alcohol while taking prescription opioids. Also, unless specifically advised by your health care provider, medications to avoid include: · Benzodiazepines (such as Xanax or Valium) · Muscle relaxants (such as Soma or Flexeril) · Hypnotics (such as Ambien or Lunesta) · Other prescription opioids KNOW YOUR OPTIONS Talk to your health care provider about ways to manage your pain that don't involve prescription opioids. Some of these options may actually work better and have fewer risks and side effects. Options may include: 
· Pain relievers such as acetaminophen, ibuprofen, and naproxen · Some medications that are also used for depression or seizures · Physical therapy and exercise · Counseling to help patients learn how to cope better with triggers of pain and stress. · Application of heat or cold compress · Massage therapy · Relaxation techniques Be Informed Make sure you know the name of your medication, how much and how often to take it, and its potential risks & side effects. IF YOU ARE PRESCRIBED OPIOIDS FOR PAIN: 
· Never take opioids in greater amounts or more often than prescribed. Remember the goal is not to be pain-free but to manage your pain at a tolerable level. · Follow up with your primary care provider to: · Work together to create a plan on how to manage your pain. · Talk about ways to help manage your pain that don't involve prescription opioids. · Talk about any and all concerns and side effects. · Help prevent misuse and abuse. · Never sell or share prescription opioids · Help prevent misuse and abuse. · Store prescription opioids in a secure place and out of reach of others (this may include visitors, children, friends, and family). · Safely dispose of unused/unwanted prescription opioids: Find your community drug take-back program or your pharmacy mail-back program, or flush them down the toilet, following guidance from the Food and Drug Administration (www.fda.gov/Drugs/ResourcesForYou). · Visit www.cdc.gov/drugoverdose to learn about the risks of opioid abuse and overdose. · If you believe you may be struggling with addiction, tell your health care provider and ask for guidance or call [address] [place], [place] [address] at [phone]-FTKJ. Discharge Instructions DISCHARGE SUMMARY from Nurse PATIENT INSTRUCTIONS: 
 
 
F-face looks uneven A-arms unable to move or move unevenly S-speech slurred or non-existent T-time-call 911 as soon as signs and symptoms begin-DO NOT go Back to bed or wait to see if you get better-TIME IS BRAIN. Warning Signs of HEART ATTACK Call 911 if you have these symptoms: 
? Chest discomfort.  Most heart attacks involve discomfort in the center of the chest that lasts more than a few minutes, or that goes away and comes back. It can feel like uncomfortable pressure, squeezing, fullness, or pain. ? Discomfort in other areas of the upper body. Symptoms can include pain or discomfort in one or both arms, the back, neck, jaw, or stomach. ? Shortness of breath with or without chest discomfort. ? Other signs may include breaking out in a cold sweat, nausea, or lightheadedness. Don't wait more than five minutes to call [address]! Fast action can save your life. Calling 911 is almost always the fastest way to get lifesaving treatment. Emergency Medical Services staff can begin treatment when they arrive  up to an hour sooner than if someone gets to the hospital by car. The discharge information has been reviewed with the patient. The patient verbalized understanding. Discharge medications reviewed with the patient and appropriate educational materials and side effects teaching were provided. ___________________________________________________________________________________________________________________________________ Kidney Stone: Care Instructions Your Care Instructions Kidney stones are formed when salts, minerals, and other substances normally found in the urine clump together. They can be as small as grains of sand or, rarely, as large as golf balls. While the stone is traveling through the ureter, which is the tube that carries urine from the kidney to the bladder, you will probably feel pain. The pain may be mild or very severe. You may also have some blood in your urine. As soon as the stone reaches the bladder, any intense pain should go away. If a stone is too large to pass on its own, you may need a medical procedure to help you pass the stone. The doctor has checked you carefully, but problems can develop later. If you notice any problems or new symptoms, get medical treatment right away. Follow-up care is a key part of your treatment and safety. Be sure to make and go to all appointments, and call your doctor if you are having problems. It's also a good idea to know your test results and keep a list of the medicines you take. How can you care for yourself at home? · Drink plenty of fluids, enough so that your urine is light yellow or clear like water. If you have kidney, heart, or liver disease and have to limit fluids, talk with your doctor before you increase the amount of fluids you drink. · Take pain medicines exactly as directed. Call your doctor if you think you are having a problem with your medicine. ¨ If the doctor gave you a prescription medicine for pain, take it as prescribed. ¨ If you are not taking a prescription pain medicine, ask your doctor if you can take an over-the-counter medicine. Read and follow all instructions on the label. · Your doctor may ask you to strain your urine so that you can collect your kidney stone when it passes. You can use a kitchen strainer or a tea strainer to catch the stone. Store it in a plastic bag until you see your doctor again. Preventing future kidney stones Some changes in your diet may help prevent kidney stones. Depending on the cause of your stones, your doctor may recommend that you: · Drink plenty of fluids, enough so that your urine is light yellow or clear like water. If you have kidney, heart, or liver disease and have to limit fluids, talk with your doctor before you increase the amount of fluids you drink. · Limit coffee, tea, and alcohol. Also avoid grapefruit juice. · Do not take more than the recommended daily dose of vitamins C and D. 
· Avoid antacids such as Gaviscon, Maalox, Mylanta, or Tums. · Limit the amount of salt (sodium) in your diet. · Eat a balanced diet that is not too high in protein.  
· Limit foods that are high in a substance called oxalate, which can cause kidney stones. These foods include dark green vegetables, rhubarb, chocolate, wheat bran, nuts, cranberries, and beans. When should you call for help? Call your doctor now or seek immediate medical care if: 
  · You cannot keep down fluids.  
  · Your pain gets worse.  
  · You have a fever or chills.  
  · You have new or worse pain in your back just below your rib cage (the flank area).  
  · You have new or more blood in your urine.  
 Watch closely for changes in your health, and be sure to contact your doctor if: 
  · You do not get better as expected. Where can you learn more? Go to http://elda-brea.info/. Enter J880 in the search box to learn more about \"Kidney Stone: Care Instructions. \" Current as of: May 12, 2017 Content Version: 11.7 © 0512-4618 [employer]. Care instructions adapted under license by [employer] (which disclaims liability or warranty for this information). If you have questions about a medical condition or this instruction, always ask your healthcare professional. [address] any warranty or liability for your use of this information. Introducing [facility]! Dear Sonido Hendrix: Thank you for requesting a [employer] account. Our records indicate that you already have an active [employer] account. You can access your account anytime at [email or web address] Did you know that you can access your hospital and ER discharge instructions at any time in [employer]? You can also review all of your test results from your hospital stay or ER visit. Additional Information If you have questions, please visit the Frequently Asked Questions section of the [employer] website at [email or web address] Remember, [employer] is NOT to be used for urgent needs. For medical emergencies, dial 911. Now available from your iPhone and Android! Introducing [facility]i [facility]ivas As a [facility] patient, I wanted to make you aware of our electronic visit tool called Fei Urrutiafin. [facility] 24/7 allows you to connect within minutes with a medical provider 24 hours a day, seven days a week via a mobile device or tablet or logging into a secure website from your computer. You can access Fei Urrutiafin from anywhere in the [place]. A virtual visit might be right for you when you have a simple condition and feel like you just dont want to get out of bed, or cant get away from work for an appointment, when your regular [facility] provider is not available (evenings, weekends or holidays), or when youre out of town and need minor care. Electronic visits cost only $49 and if the [employer] [facility] 24/7 provider determines a prescription is needed to treat your condition, one can be electronically transmitted to a nearby pharmacy*. Please take a moment to enroll today if you have not already done so. The enrollment process is free and takes just a few minutes. To enroll, please download the [facility] 24/7 jaime to your tablet or phone, or visit [email or web address] to enroll on your computer. And, as an [address] [place] [address] patient with a [employer] account, the results of your visits will be scanned into your electronic medical record and your primary care provider will be able to view the scanned results. We urge you to continue to see your regular [facility] provider for your ongoing medical care. And while your primary care provider may not be the one available when you seek a Fei Rivas virtual visit, the peace of mind you get from getting a real diagnosis real time can be priceless. For more information on Fei Yuansongfin, view our Frequently Asked Questions (FAQs) at [email or web address]. Sincerely, 
 
Lucy Barron MD 
Chief Medical Officer [address] *:  certain medications cannot be prescribed via Fei Rivas Providers Seen During Your Hospitalization Provider Specialty Primary office phone Jay Baeza MD Urology [phone] Your Primary Care Physician (PCP) Primary Care Physician Office Phone Office Fax Mary GUPTA [phone] You are allergic to the following Allergen Reactions Venom-Honey Bee Angioedema Recent Documentation Height Weight BMI Smoking Status 1.829 m 114.2 kg 34.14 kg/m2 Current Every Day Smoker Emergency Contacts Name Discharge Info Relation Home Work Mobile Maranda Souza DISCHARGE CAREGIVER [3] Spouse [3] [phone] Patient Belongings The following personal items are in your possession at time of discharge: 
  Dental Appliances: None  Visual Aid: Glasses, With patient      Home Medications: None   Jewelry: Ring, Necklace (wedding band)  Clothing: None    Other Valuables: None Please provide this summary of care documentation to your next provider. Signatures-by signing, you are acknowledging that this After Visit Summary has been reviewed with you and you have received a copy. Patient Signature:  ____________________________________________________________ Date:  ____________________________________________________________  
  
[place] [employer] Provider Signature:  ____________________________________________________________ Date:  ____________________________________________________________

## 2018-08-08 NOTE — ANESTHESIA PREPROCEDURE EVALUATION
Anesthetic History   No history of anesthetic complications            Review of Systems / Medical History  Patient summary reviewed, nursing notes reviewed and pertinent labs reviewed    Pulmonary    COPD      Smoker         Neuro/Psych         TIA and psychiatric history     Cardiovascular    Hypertension      CHF    Pacemaker (VVI ICD in place ), CAD (s/p CABG 2010), PAD (s/p EVAR), cardiac stents (april 2016) and CABG    Exercise tolerance: <4 METS  Comments: EF 25-30%-  Cath April 2016- severe CAD with 3/4 grafts patent and PCI/ stenting of SVG graft      Denies recent CP, SOB, Palps, Syncope, Fatigue         GI/Hepatic/Renal     GERD    Renal disease: stones       Endo/Other        Obesity and arthritis     Other Findings              Physical Exam    Airway  Mallampati: III    Neck ROM: normal range of motion   Mouth opening: Normal    Comments: Big beard Cardiovascular  Regular rate and rhythm,  S1 and S2 normal,  no murmur, click, rub, or gallop             Dental  No notable dental hx       Pulmonary  Breath sounds clear to auscultation               Abdominal         Other Findings            Anesthetic Plan    ASA: 4  Anesthesia type: general          Induction: Intravenous  Anesthetic plan and risks discussed with: Patient and Spouse

## 2018-08-08 NOTE — PROGRESS NOTES
made initial visit. Pt was alert and verbal.  Pt was uncomfortable as staff was working with him to draw blood. Pt's wife was present who shared that pt hadn't eaten in 2 days. In addition, wife shared that she was taking care of her mother at home and would be leaving shortly to care for her.  welcomed them to [facility] and shared information about  services.  provided spiritual care through presence, pastoral conversation, and assurance of prayer.

## 2018-08-08 NOTE — H&P
Per office note from Dr. Severa Art:        [facility]  [address]  [facility], [address]  [phone]     Aniceto Hawthorne  : 1955          Chief Complaint   Patient presents with    Kidney Stone        Pt is here for 5 mm stone.  New Patient      CT urogram - 5 mm stone left proximal ureter   HPI      Aniceto Hawthorne is a 63 y.o. male     On KUB the left-sided stone may have moved down more distally and is in the pelvis. I do not see any stone over the psoas shadow where it had been on the CT scan.          KUB today shows the aortic graft some calcifications in the true pelvis that are probably phleboliths. The CT scan does not show any obvious calcifications in the distal ureter but there are calcifications in the prostate.     Patient had need much in 2 days he has been sipping on liquids. He is probably somewhat volume depleted. He told me they did not give him IV fluid in the emergency room on not sure if they did or not. Patient looks weak is has nausea.          Past Medical History:   Diagnosis Date    AAA (abdominal aortic aneurysm) ([address] 2011 AORTIC ABDOMINAL ANUERYSM REPAIR ENDOVASCULAR (EVAR)-  / Yasmine 4.6cm on US 11     Acute myocardial infarction [facility])      MI 2/10 - Quad bypass 2/25/10 ,2016    Anticoagulation monitoring, INR range 2-3 10/5/2011    Anxiety 11/3/2011    Arthritis 11/3/2011    CAD (coronary artery disease)      CAD (coronary artery disease), native coronary artery 2010    Cardiomyopathy ([address] 2010    Chest discomfort 06/16/15     Tightness, Pressure.   2014:  stress MPI with Lexiscan - normal perfusion, LVEF 52%    Chronic systolic heart failure ([address] 2010    Claudication ([address] 06/16/15     Claudication, symptom, pain relieved by rest    Congestive heart failure, unspecified      COPD (chronic obstructive pulmonary disease) ([address] 11/3/2011    Extremity atherosclerosis with resting pain ([facility] 9/23/2011 9/28/11 ; Gely Haque- Dr. Moise Denver GERD (gastroesophageal reflux disease) 11/3/2011    History of AAA (abdominal aortic aneurysm) repair ? ??     including right femoral artery    Hypercholesterolemia      Hyperlipidemia 11/3/2011    Hypertension 11/3/2011    Popliteal artery aneurysm, bilateral History 9/23/2011     L Pop A aneurysm stent graft 9/8/11- Dr. Cory Cheung A aneurysm repair      Psychiatric disorder       anxiety    PUD (peptic ulcer disease) ? ??     hx bleeding gastric ulcers     PVD (peripheral vascular disease) ([facility] 11/3/2011    S/P AAA (abdominal aortic aneurysm) repair 12/11/2013     EVAR    S/P CABG (coronary artery bypass graft) 4/26/2016    Tobacco abuse 4/23/2014    Weight loss, unintentional 9/23/2011            Past Surgical History:   Procedure Laterality Date    BYPASS GRAFT OTHR,FEM-POP   03/18/2011     right leg    BYPASS GRAFT OTHR,FEM-TIBIAL   2012     LLE bypass pop aneurysm    CABG, ARTERY-VEIN, FIVE         2010    CABG, ARTERY-VEIN, FOUR   2010    HX AAA REPAIR         10/2011    HX CHOLECYSTECTOMY   2012    HX FEMORAL BYPASS         Left bypass stent graft    LAP,CHOLECYSTECTOMY         09/06/11             Current Outpatient Prescriptions   Medication Sig Dispense Refill    HYDROcodone-acetaminophen (NORCO) 7.5-325 mg per tablet          pravastatin (PRAVACHOL) 20 mg tablet          dicyclomine (BENTYL) 10 mg capsule          NARCAN 4 mg/actuation nasal spray          ondansetron hcl (ZOFRAN) 4 mg tablet Take 1 Tab by mouth every eight (8) hours as needed for Nausea. 12 Tab 0    lisinopril (PRINIVIL, ZESTRIL) 5 mg tablet Take  by mouth daily.        clopidogrel (PLAVIX) 75 mg tablet Take 1 Tab by mouth daily. 30 Tab 11    HYDROcodone-acetaminophen (NORCO)  mg tablet Take 1 Tab by mouth every four (4) hours as needed.  Max Daily Amount: 6 Tabs. (Patient taking differently: Take 1 Tab by mouth every four (4) hours as needed. Indications: Pt takes 7.5 mg) 20 Tab 0    acetaminophen (TYLENOL) 500 mg tablet Take  by mouth every six (6) hours as needed for Pain.        NITROSTAT 0.4 mg SL tablet          omeprazole (PRILOSEC) 40 mg capsule Take 40 mg by mouth daily.        aspirin delayed-release 81 mg tablet Take  by mouth daily.        carvedilol (COREG) 6.25 mg tablet Take 1 Tab by mouth two (2) times daily (with meals). 60 Tab 11    atorvastatin (LIPITOR) 80 mg tablet Take 1 Tab by mouth daily. 30 Tab 11    zolpidem (AMBIEN) 10 mg tablet Take 10 mg by mouth nightly as needed.        diazepam (VALIUM) 5 mg tablet Take 5 mg by mouth every twelve (12) hours as needed.        diphenhydrAMINE (ALLERGY) 25 mg capsule Take 25 mg by mouth every six (6) hours as needed.                    Current Facility-Administered Medications   Medication Dose Route Frequency Provider Last Rate Last Dose    promethazine (PHENERGAN) injection 25 mg  25 mg IntraMUSCular ONCE Sherrill Shaffer          meperidine (pf) (DEMEROL) 50 mg/mL injection 50 mg  50 mg IntraMUSCular ONCE Jarad Carbajal MD                 Allergies   Allergen Reactions    Venom-Honey Bee Angioedema      Social History            Social History    Marital status:        Spouse name: N/A    Number of children: N/A    Years of education: N/A          Occupational History    Not on file.             Social History Main Topics    Smoking status: Current Every Day Smoker       Packs/day: 0.50       Years: 40.00       Types: Cigarettes    Smokeless tobacco: Current User    Alcohol use No    Drug use:  No         Comment: very rare    Sexual activity: Not on file           Other Topics Concern    Not on file      Social History Narrative            Family History   Problem Relation Age of Onset    Heart Disease Mother      Hypertension Mother      Cancer Maternal Grandmother      Heart Disease Maternal Grandfather      Hypertension Maternal Uncle      Diabetes Maternal Uncle      Diabetes Brother      Cancer Paternal Uncle           Review of Systems  Constitutional:   Negative for fever and chills. Genitourinary: Positive for history of urolithiasis. Negative for urgency and frequent urination. Musculoskeletal: Positive for back pain.        Urinalysis  UA - Dipstick  No results found for this or any previous visit.     UA - Micro  WBC - 0  RBC - 0  Bacteria - 0  Epith - 0     Physical Exam   Constitutional: He is oriented to person, place, and time. Definitely appears older than his stated age. Cardiovascular: Normal rate. Pulmonary/Chest: Effort normal.   Musculoskeletal: Normal range of motion. Neurological: He is oriented to person, place, and time. Skin: Skin is warm and dry.            Assessment and Plan      ICD-10-CM ICD-9-CM     1. Kidney stone N20.0 592.0 AMB POC XRAY ABDOMEN 1 VIEW         promethazine (PHENERGAN) injection 25 mg   2. Chronic systolic heart failure ([facility]) I50.22 428.22     3. Coronary artery disease involving native coronary artery of native heart without angina pectoris I25.10 414.01     4. Extremity atherosclerosis with resting pain ([facility]) I70.229 440.22     5. PVD (peripheral vascular disease) ([facility]) I73.9 443. 9     6. Chronic bronchitis, unspecified chronic bronchitis type ([facility]) J42 491.9     7. Tobacco abuse Z72.0 305. 1     8. S/P AAA (abdominal aortic aneurysm) repair Z98.890 V45.89       Z86.79       Left sided ureteral stone on CT scan. Patient stone may have moved a little more distally but is difficult to tell from the KUB. He still hurting and has nausea I recommend we get him admitted place him on IV fluid hydration pain medication hydrate him overnight and get him set up tomorrow for a cystoscopy left ureteroscopy laser lithotripsy and stent. Unless he passes the stone.         Orders Placed This Encounter    KUB       Order Specific Question:   Reason for Exam       Answer:   stone       Order Specific Question:   Is Patient Allergic to Contrast Dye?       Answer:   Unknown    HYDROcodone-acetaminophen (NORCO) 7.5-325 mg per tablet    pravastatin (PRAVACHOL) 20 mg tablet    dicyclomine (BENTYL) 10 mg capsule    NARCAN 4 mg/actuation nasal spray    promethazine (PHENERGAN) injection 25 mg    meperidine (pf) (DEMEROL) 50 mg/mL injection 50 mg         Follow-up Disposition:  Return for Patient will be admitted to treat the left ureteral stone sometime tomorrow. .        Elements of this note have been dictated using speech recognition software. Although reviewed, errors of speech recognition may have occurred.              Revision History       Date/Time User Provider Type Action     08/08/18 6194 J Nguyễn Mccauley MD Physician Sign     08/08/18 [address] [facility] Assistant Sign at close encounter     View Details Report                      Plan:    Left sided ureteral stone on CT scan. Toni Franklin may have moved a little more distally but is difficult to tell from the KUB. He still hurting and has nausea I recommend we get him admitted place him on IV fluid hydration and give pain medication. Will hydrate him overnight and get him set up tomorrow for a cystoscopy, left ureteroscopy, laser lithotripsy and stent. In the interim, we will see if he can pass stone by straining urine. No c/o dysuria or hematuria at present time. He is voiding without issue. UA from 8/6- negative for infection. Afebrile. VSS with exception of high BP likely due to pain. NS @125 ml/hr    IV pain/nausea medication    Flomax    Strain urine    Regular diet for now, NPO at MN for cystoscopy, left ureteroscopy, laser lithotripsy and left ureteral stent placement 8/9/18 by Dr. Jeremy Kimble. Verify consent has been obtained. 2g IV ancef on-call to OR tomorrow.

## 2018-08-08 NOTE — PROGRESS NOTES
08/08/18 6654   Dual Skin Pressure Injury Assessment   Dual Skin Pressure Injury Assessment WDL   Second Care Provider (Based on [address] [place] [address]) Maria M OLIVEIRA RN   Skin Integumentary   Skin Integumentary (WDL) X   Skin Color Appropriate for ethnicity   Skin Condition/Temp Dry; Warm   Skin Integrity Intact;Scars (comment); Tattoos (comment)   Turgor Non-tenting   Hair Growth Present   Varicosities Absent   Patient on unit, oriented to unit, call light within reach, no skin breakdown noted, SCD's placed

## 2018-08-09 ENCOUNTER — ANESTHESIA (OUTPATIENT)
Dept: SURGERY | Age: 63
End: 2018-08-09
Payer: MEDICARE

## 2018-08-09 ENCOUNTER — ANESTHESIA EVENT (OUTPATIENT)
Dept: SURGERY | Age: 63
End: 2018-08-09
Payer: MEDICARE

## 2018-08-09 PROCEDURE — 76010000160 HC OR TIME 0.5 TO 1 HR INTENSV-TIER 1: Performed by: UROLOGY

## 2018-08-09 PROCEDURE — 77030020782 HC GWN BAIR PAWS FLX 3M -B: Performed by: ANESTHESIOLOGY

## 2018-08-09 PROCEDURE — 74011250636 HC RX REV CODE- 250/636: Performed by: ANESTHESIOLOGY

## 2018-08-09 PROCEDURE — 74011250636 HC RX REV CODE- 250/636

## 2018-08-09 PROCEDURE — 74011250637 HC RX REV CODE- 250/637: Performed by: ANESTHESIOLOGY

## 2018-08-09 PROCEDURE — 99218 HC RM OBSERVATION: CPT

## 2018-08-09 PROCEDURE — 77030018846 HC SOL IRR STRL H20 ICUM -A: Performed by: UROLOGY

## 2018-08-09 PROCEDURE — 77030019927 HC TBNG IRR CYSTO BAXT -A: Performed by: UROLOGY

## 2018-08-09 PROCEDURE — 74011250637 HC RX REV CODE- 250/637: Performed by: NURSE PRACTITIONER

## 2018-08-09 PROCEDURE — C2617 STENT, NON-COR, TEM W/O DEL: HCPCS | Performed by: UROLOGY

## 2018-08-09 PROCEDURE — 77030018832 HC SOL IRR H20 ICUM -A: Performed by: UROLOGY

## 2018-08-09 PROCEDURE — 77030032490 HC SLV COMPR SCD KNE COVD -B: Performed by: UROLOGY

## 2018-08-09 PROCEDURE — 74011000250 HC RX REV CODE- 250

## 2018-08-09 PROCEDURE — 74011250636 HC RX REV CODE- 250/636: Performed by: NURSE PRACTITIONER

## 2018-08-09 PROCEDURE — C1769 GUIDE WIRE: HCPCS | Performed by: UROLOGY

## 2018-08-09 PROCEDURE — 76060000032 HC ANESTHESIA 0.5 TO 1 HR: Performed by: UROLOGY

## 2018-08-09 PROCEDURE — 94760 N-INVAS EAR/PLS OXIMETRY 1: CPT

## 2018-08-09 PROCEDURE — 77030008703 HC TU ET UNCUF COVD -A: Performed by: ANESTHESIOLOGY

## 2018-08-09 PROCEDURE — 96366 THER/PROPH/DIAG IV INF ADDON: CPT

## 2018-08-09 PROCEDURE — 77030008477 HC STYL SATN SLP COVD -A: Performed by: ANESTHESIOLOGY

## 2018-08-09 PROCEDURE — 77030020263 HC SOL INJ SOD CL0.9% LFCR 1000ML

## 2018-08-09 PROCEDURE — 77030033647: Performed by: UROLOGY

## 2018-08-09 PROCEDURE — 76210000006 HC OR PH I REC 0.5 TO 1 HR: Performed by: UROLOGY

## 2018-08-09 PROCEDURE — 77010033678 HC OXYGEN DAILY

## 2018-08-09 DEVICE — URETERAL STENT
Type: IMPLANTABLE DEVICE | Site: URETER | Status: FUNCTIONAL
Brand: PERCUFLEX™ PLUS

## 2018-08-09 RX ORDER — CARVEDILOL 6.25 MG/1
6.25 TABLET ORAL 2 TIMES DAILY WITH MEALS
Status: DISCONTINUED | OUTPATIENT
Start: 2018-08-10 | End: 2018-08-10 | Stop reason: HOSPADM

## 2018-08-09 RX ORDER — PROPOFOL 10 MG/ML
INJECTION, EMULSION INTRAVENOUS AS NEEDED
Status: DISCONTINUED | OUTPATIENT
Start: 2018-08-09 | End: 2018-08-09 | Stop reason: HOSPADM

## 2018-08-09 RX ORDER — ONDANSETRON 4 MG/1
4 TABLET, ORALLY DISINTEGRATING ORAL
Status: DISCONTINUED | OUTPATIENT
Start: 2018-08-09 | End: 2018-08-10 | Stop reason: HOSPADM

## 2018-08-09 RX ORDER — SODIUM CHLORIDE 0.9 % (FLUSH) 0.9 %
5-10 SYRINGE (ML) INJECTION AS NEEDED
Status: DISCONTINUED | OUTPATIENT
Start: 2018-08-09 | End: 2018-08-09 | Stop reason: HOSPADM

## 2018-08-09 RX ORDER — CLOPIDOGREL BISULFATE 75 MG/1
75 TABLET ORAL DAILY
Status: DISCONTINUED | OUTPATIENT
Start: 2018-08-10 | End: 2018-08-10 | Stop reason: HOSPADM

## 2018-08-09 RX ORDER — SUCCINYLCHOLINE CHLORIDE 20 MG/ML
INJECTION INTRAMUSCULAR; INTRAVENOUS AS NEEDED
Status: DISCONTINUED | OUTPATIENT
Start: 2018-08-09 | End: 2018-08-09 | Stop reason: HOSPADM

## 2018-08-09 RX ORDER — NITROGLYCERIN 0.4 MG/1
0.4 TABLET SUBLINGUAL AS NEEDED
Status: DISCONTINUED | OUTPATIENT
Start: 2018-08-09 | End: 2018-08-10 | Stop reason: HOSPADM

## 2018-08-09 RX ORDER — SODIUM CHLORIDE 9 MG/ML
50 INJECTION, SOLUTION INTRAVENOUS CONTINUOUS
Status: DISCONTINUED | OUTPATIENT
Start: 2018-08-09 | End: 2018-08-09 | Stop reason: HOSPADM

## 2018-08-09 RX ORDER — EPHEDRINE SULFATE 50 MG/ML
INJECTION, SOLUTION INTRAVENOUS AS NEEDED
Status: DISCONTINUED | OUTPATIENT
Start: 2018-08-09 | End: 2018-08-09 | Stop reason: HOSPADM

## 2018-08-09 RX ORDER — PANTOPRAZOLE SODIUM 40 MG/1
40 TABLET, DELAYED RELEASE ORAL
Status: DISCONTINUED | OUTPATIENT
Start: 2018-08-10 | End: 2018-08-10 | Stop reason: HOSPADM

## 2018-08-09 RX ORDER — SODIUM CHLORIDE, SODIUM LACTATE, POTASSIUM CHLORIDE, CALCIUM CHLORIDE 600; 310; 30; 20 MG/100ML; MG/100ML; MG/100ML; MG/100ML
100 INJECTION, SOLUTION INTRAVENOUS CONTINUOUS
Status: DISCONTINUED | OUTPATIENT
Start: 2018-08-09 | End: 2018-08-09 | Stop reason: HOSPADM

## 2018-08-09 RX ORDER — ASPIRIN 81 MG/1
81 TABLET ORAL DAILY
Status: DISCONTINUED | OUTPATIENT
Start: 2018-08-10 | End: 2018-08-10 | Stop reason: HOSPADM

## 2018-08-09 RX ORDER — OXYCODONE AND ACETAMINOPHEN 5; 325 MG/1; MG/1
1 TABLET ORAL AS NEEDED
Status: DISCONTINUED | OUTPATIENT
Start: 2018-08-09 | End: 2018-08-09 | Stop reason: HOSPADM

## 2018-08-09 RX ORDER — OXYCODONE HYDROCHLORIDE 5 MG/1
5 TABLET ORAL
Status: DISCONTINUED | OUTPATIENT
Start: 2018-08-09 | End: 2018-08-09 | Stop reason: HOSPADM

## 2018-08-09 RX ORDER — LIDOCAINE HYDROCHLORIDE 10 MG/ML
0.1 INJECTION INFILTRATION; PERINEURAL AS NEEDED
Status: DISCONTINUED | OUTPATIENT
Start: 2018-08-09 | End: 2018-08-09 | Stop reason: HOSPADM

## 2018-08-09 RX ORDER — LIDOCAINE HYDROCHLORIDE 20 MG/ML
INJECTION, SOLUTION EPIDURAL; INFILTRATION; INTRACAUDAL; PERINEURAL AS NEEDED
Status: DISCONTINUED | OUTPATIENT
Start: 2018-08-09 | End: 2018-08-09 | Stop reason: HOSPADM

## 2018-08-09 RX ORDER — HYDROMORPHONE HYDROCHLORIDE 2 MG/ML
0.5 INJECTION, SOLUTION INTRAMUSCULAR; INTRAVENOUS; SUBCUTANEOUS
Status: DISCONTINUED | OUTPATIENT
Start: 2018-08-09 | End: 2018-08-09 | Stop reason: HOSPADM

## 2018-08-09 RX ORDER — MIDAZOLAM HYDROCHLORIDE 1 MG/ML
2 INJECTION, SOLUTION INTRAMUSCULAR; INTRAVENOUS ONCE
Status: DISCONTINUED | OUTPATIENT
Start: 2018-08-09 | End: 2018-08-09 | Stop reason: HOSPADM

## 2018-08-09 RX ORDER — ROCURONIUM BROMIDE 10 MG/ML
INJECTION, SOLUTION INTRAVENOUS AS NEEDED
Status: DISCONTINUED | OUTPATIENT
Start: 2018-08-09 | End: 2018-08-09 | Stop reason: HOSPADM

## 2018-08-09 RX ORDER — SODIUM CHLORIDE 0.9 % (FLUSH) 0.9 %
5-10 SYRINGE (ML) INJECTION EVERY 8 HOURS
Status: DISCONTINUED | OUTPATIENT
Start: 2018-08-09 | End: 2018-08-09 | Stop reason: HOSPADM

## 2018-08-09 RX ORDER — MIDAZOLAM HYDROCHLORIDE 1 MG/ML
2 INJECTION, SOLUTION INTRAMUSCULAR; INTRAVENOUS
Status: COMPLETED | OUTPATIENT
Start: 2018-08-09 | End: 2018-08-09

## 2018-08-09 RX ORDER — DEXAMETHASONE SODIUM PHOSPHATE 4 MG/ML
INJECTION, SOLUTION INTRA-ARTICULAR; INTRALESIONAL; INTRAMUSCULAR; INTRAVENOUS; SOFT TISSUE AS NEEDED
Status: DISCONTINUED | OUTPATIENT
Start: 2018-08-09 | End: 2018-08-09 | Stop reason: HOSPADM

## 2018-08-09 RX ORDER — CEFAZOLIN SODIUM/WATER 2 G/20 ML
2 SYRINGE (ML) INTRAVENOUS
Status: DISCONTINUED | OUTPATIENT
Start: 2018-08-09 | End: 2018-08-10 | Stop reason: HOSPADM

## 2018-08-09 RX ORDER — FAMOTIDINE 20 MG/1
20 TABLET, FILM COATED ORAL ONCE
Status: COMPLETED | OUTPATIENT
Start: 2018-08-09 | End: 2018-08-09

## 2018-08-09 RX ORDER — DIPHENHYDRAMINE HYDROCHLORIDE 50 MG/ML
12.5 INJECTION, SOLUTION INTRAMUSCULAR; INTRAVENOUS ONCE
Status: DISCONTINUED | OUTPATIENT
Start: 2018-08-09 | End: 2018-08-09 | Stop reason: HOSPADM

## 2018-08-09 RX ORDER — FENTANYL CITRATE 50 UG/ML
INJECTION, SOLUTION INTRAMUSCULAR; INTRAVENOUS AS NEEDED
Status: DISCONTINUED | OUTPATIENT
Start: 2018-08-09 | End: 2018-08-09 | Stop reason: HOSPADM

## 2018-08-09 RX ORDER — FENTANYL CITRATE 50 UG/ML
25 INJECTION, SOLUTION INTRAMUSCULAR; INTRAVENOUS ONCE
Status: DISCONTINUED | OUTPATIENT
Start: 2018-08-09 | End: 2018-08-09 | Stop reason: HOSPADM

## 2018-08-09 RX ORDER — DIAZEPAM 5 MG/1
5 TABLET ORAL
Status: DISCONTINUED | OUTPATIENT
Start: 2018-08-09 | End: 2018-08-10 | Stop reason: HOSPADM

## 2018-08-09 RX ADMIN — Medication 10 ML: at 21:07

## 2018-08-09 RX ADMIN — Medication 2 G: at 08:30

## 2018-08-09 RX ADMIN — EPHEDRINE SULFATE 15 MG: 50 INJECTION, SOLUTION INTRAVENOUS at 19:21

## 2018-08-09 RX ADMIN — Medication 10 ML: at 06:20

## 2018-08-09 RX ADMIN — FENTANYL CITRATE 100 MCG: 50 INJECTION, SOLUTION INTRAMUSCULAR; INTRAVENOUS at 19:00

## 2018-08-09 RX ADMIN — LIDOCAINE HYDROCHLORIDE 100 MG: 20 INJECTION, SOLUTION EPIDURAL; INFILTRATION; INTRACAUDAL; PERINEURAL at 19:00

## 2018-08-09 RX ADMIN — FAMOTIDINE 20 MG: 20 TABLET ORAL at 16:51

## 2018-08-09 RX ADMIN — TAMSULOSIN HYDROCHLORIDE 0.4 MG: 0.4 CAPSULE ORAL at 09:14

## 2018-08-09 RX ADMIN — Medication 10 ML: at 15:07

## 2018-08-09 RX ADMIN — DEXAMETHASONE SODIUM PHOSPHATE 4 MG: 4 INJECTION, SOLUTION INTRA-ARTICULAR; INTRALESIONAL; INTRAMUSCULAR; INTRAVENOUS; SOFT TISSUE at 19:09

## 2018-08-09 RX ADMIN — SODIUM CHLORIDE 125 ML/HR: 900 INJECTION, SOLUTION INTRAVENOUS at 21:04

## 2018-08-09 RX ADMIN — SODIUM CHLORIDE, SODIUM LACTATE, POTASSIUM CHLORIDE, AND CALCIUM CHLORIDE: 600; 310; 30; 20 INJECTION, SOLUTION INTRAVENOUS at 19:30

## 2018-08-09 RX ADMIN — OXYCODONE HYDROCHLORIDE AND ACETAMINOPHEN 1 TABLET: 5; 325 TABLET ORAL at 19:56

## 2018-08-09 RX ADMIN — PROPOFOL 200 MG: 10 INJECTION, EMULSION INTRAVENOUS at 19:00

## 2018-08-09 RX ADMIN — SUCCINYLCHOLINE CHLORIDE 200 MG: 20 INJECTION INTRAMUSCULAR; INTRAVENOUS at 19:00

## 2018-08-09 RX ADMIN — ROCURONIUM BROMIDE 10 MG: 10 INJECTION, SOLUTION INTRAVENOUS at 19:00

## 2018-08-09 RX ADMIN — MIDAZOLAM HYDROCHLORIDE 2 MG: 1 INJECTION, SOLUTION INTRAMUSCULAR; INTRAVENOUS at 17:25

## 2018-08-09 RX ADMIN — SODIUM CHLORIDE, SODIUM LACTATE, POTASSIUM CHLORIDE, AND CALCIUM CHLORIDE 100 ML/HR: 600; 310; 30; 20 INJECTION, SOLUTION INTRAVENOUS at 16:15

## 2018-08-09 RX ADMIN — HYDROMORPHONE HYDROCHLORIDE 0.5 MG: 2 INJECTION, SOLUTION INTRAMUSCULAR; INTRAVENOUS; SUBCUTANEOUS at 19:57

## 2018-08-09 NOTE — BRIEF OP NOTE
BRIEF OPERATIVE NOTE    Date of Procedure: 8/9/2018   Preoperative Diagnosis: Ureteral stone [N20.1]  Postoperative Diagnosis: Ureteral stone [N20.1]    Procedure(s):  CYSTOSCOPY LEFT URETEROSCOPY LASER LITHO LEFT URETERAL STENT INSERTION/ BEING ADMITTED 8/8  Surgeon(s) and Role:     * Jarred Siddiqui MD - Primary         Surgical Assistant: none    Surgical Staff:  Circ-1: Gatito Collins RN  Circ-Relief: Hernandez Stroud RN  Event Time In   Incision Start 1913   Incision Close 1941     Anesthesia: General   Estimated Blood Loss: none  Specimens: * No specimens in log *   Findings: see op note   Complications: none  Implants:   Implant Name Type Inv.  Item Serial No.  Lot No. LRB No. Used Action   Carson Dace FIRM [id number] -- [facility] - [id number]   Carson Dace FIRM [id number] -- [facility] 19 [id number] 1 Implanted

## 2018-08-09 NOTE — PROGRESS NOTES
TRANSFER - OUT REPORT:    Verbal report given to Dang De La Paz RN(name) on Trino Mancera  being transferred to Pre-op(unit) for ordered procedure       Report consisted of patients Situation, Background, Assessment and   Recommendations(SBAR). Information from the following report(s) SBAR was reviewed with the receiving nurse. Lines:   Peripheral IV 08/08/18 Right Antecubital (Active)   Site Assessment Clean, dry, & intact 8/9/2018  1:50 PM   Phlebitis Assessment 0 8/9/2018  1:50 PM   Infiltration Assessment 0 8/9/2018  1:50 PM   Dressing Status Clean, dry, & intact 8/9/2018  1:50 PM   Dressing Type Tape;Transparent 8/9/2018  1:50 PM   Hub Color/Line Status Infusing 8/9/2018  1:50 PM        Opportunity for questions and clarification was provided.       Patient transported with:   Juliana Martinez from [employer]

## 2018-08-09 NOTE — PROGRESS NOTES
Admit Date: 8/8/2018    Subjective:     Mr. Houston Richards is resting this morning and reports good pain control with PCA pump. He has been straining urine. Objective:     Patient Vitals for the past 8 hrs:   BP Temp Pulse Resp SpO2 Weight   08/09/18 0730 123/71 97.7 °F (36.5 °C) 78 18 93 % -   08/09/18 0550 - - - - - 232 lb 14.4 oz (105.6 kg)   08/09/18 0536 124/61 98.9 °F (37.2 °C) 86 18 94 % -        08/07 1901 - 08/09 0700  In: -   Out: 1400 [Urine:1400]    Physical Exam:  GENERAL: alert, cooperative, no distress  LUNG: clear to auscultation bilaterally  HEART: regular rate and rhythm, S1, S2  ABDOMEN: soft, non-tender. Active BS  NEUROLOGIC: AOx3      Data Review No results found for this or any previous visit (from the past 24 hour(s)). Assessment:     Active Problems:    Left ureteral stone (8/8/2018)    VSS. Afebrile. UA from 8/6- negative for infection. CT urogram:  IMPRESSION:        1. Obstructing calculus just distal to the left UPJ within the proximal left  ureter with mild left-sided hydronephrosis. 2. Bilateral renal cysts. 3. Aortobiiliac endograft. 4. Status post cholecystectomy. 5. Diverticulosis without CT evidence of diverticulitis. Plan: To OR later today (5 pm) for cystoscopy, left ureteroscopy, laser lithotripsy and left ureteral stent placement by Dr. Brown Segal. NPO.    2g IV ancef on call to OR this evening. Dragan Ramírez NP  Patient did well after the stone procedure will discharge home and get the stent out in a couple days.   He will go home on antibiotic pain medicine

## 2018-08-09 NOTE — PROGRESS NOTES
Problem: Nutrition Deficit  Goal: *Optimize nutritional status  Nutrition  Reason for assessment: Referral received from nursing admission Malnutrition Screening Tool for recently lost 2-13# without trying and eating poorly due to decreased appetite. Assessment:   Diet order(s): NPO  Food/Nutrition Patient History:  The patient is noted to have a h/o CAD, CHF, AAA, PUD and CVA. The patient reports that his appetite is doing well and states \"if we fed him a cheeseburger right now he could eat it all in 1 bite. \" He reports diarrhea prior to admission but none since admission. He states he was having some issues with nausea and vomiting but these have subsided since he has been admitted. His abdomen is currently documented as distended with active bowel sounds. The patient reports that he had about an 8-10# weight loss over the past few months. He attributes his weight loss to sweat. Wife reports he likes to do yard work and \"sweats like a pig. \" Weight history in the EMR cannot be verified as accurate due to unknown weight source (pt stated vs estimated vs measured). Weight Loss Metrics 8/9/2018 8/8/2018 8/8/2018 8/8/2018   Today's Wt 232 lb 14.4 oz  228 lb    BMI  31.59 kg/m2  30.92 kg/m2     Weight Loss Metrics 8/6/2018 7/19/2018 1/19/2018   Today's Wt 228 lb 228 lb 12.8 oz 233 lb   BMI 30.92 kg/m2 31.03 kg/m2 31.6 kg/m2   According to the EMR the patient's weight has remained fairly stable. Anthropometrics: Height: 6' 1\",  Weight: 105.6 kg (232 lb 14.4 oz), Weight Source: unknown, Body mass index is 31.59 kg/(m^2). BMI class of obesity class I.    Macronutrient needs:  EER:  8667-7114 kcal /day (15-20 kcal/kg listed BW)  EPR:   grams protein/day (1-1.2 grams/kg IBW)  Intake/Comparative Standards: Current NPO status does not meet estimated needs    Nutrition Diagnosis: Inadequate oral intake related to restrictive diet as evidenced by patient with current NPO diet order not meeting needs.    Intervention:  Meals and snacks: Advance diet as medically appropriate. Nutrition Supplement Therapy: None at this time   Nutrition Discharge Plan: too soon to be determined    Brett Sathya.  Vladimir Gamboa [address], [address],  [phone]

## 2018-08-09 NOTE — ANESTHESIA PREPROCEDURE EVALUATION
Anesthetic History   No history of anesthetic complications            Review of Systems / Medical History  Patient summary reviewed and pertinent labs reviewed    Pulmonary    COPD: mild      Smoker         Neuro/Psych         TIA and psychiatric history     Cardiovascular    Hypertension: well controlled          Pacemaker, past MI, CAD, PAD, CABG (2016) and hyperlipidemia    Exercise tolerance: <4 METS  Comments: AAA 9/11 stent  Cardiomyopathy    EF 35%, LAE   GI/Hepatic/Renal     GERD: well controlled      PUD     Endo/Other        Obesity and arthritis     Other Findings   Comments: DVT  Poor historian  anxiety         Physical Exam    Airway  Mallampati: II  TM Distance: 4 - 6 cm  Neck ROM: normal range of motion   Mouth opening: Normal    Comments: Full beard Cardiovascular  Regular rate and rhythm,  S1 and S2 normal,  no murmur, click, rub, or gallop  Rhythm: regular  Rate: normal         Dental  No notable dental hx       Pulmonary  Breath sounds clear to auscultation               Abdominal  GI exam deferred       Other Findings            Anesthetic Plan    ASA: 3  Anesthesia type: general          Induction: Intravenous and RSI  Anesthetic plan and risks discussed with: Patient      NPO

## 2018-08-09 NOTE — PERIOP NOTES
TRANSFER - IN REPORT:    Verbal report received from Rex Schultz, [address] [place] [address] (name) on Kan Lofton  being received from 6th floor (unit) for ordered procedure      Report consisted of patients Situation, Background, Assessment and   Recommendations(SBAR). Information from the following report(s) SBAR and MAR was reviewed with the receiving nurse. Opportunity for questions and clarification was provided. Assessment completed upon patients arrival to unit and care assumed. Patient arrived to unit alert and oriented. Spouse and mother at bedside.

## 2018-08-09 NOTE — PROGRESS NOTES
CM met with patient for discharge planning. Patient lives with his wife and mother in a trailer. The trailer has a ramp to get inside. 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 Patient uses a cane as needed. Patient stated that he was independent and driving prior to admission. Patient denies difficulty filling prescriptions. Anticipate no discharge needs. Will follow for any discharge needs. Care Management Interventions  PCP Verified by CM: Yes (Dr. Noelle Galvan)  Mode of Transport at Discharge:  Other (see comment) (wife)  Transition of Care Consult (CM Consult): Discharge Planning  Discharge Durable Medical Equipment:  (Pt. uses a cane)  Physical Therapy Consult: No  Occupational Therapy Consult: No  Current Support Network: Lives with Spouse  Confirm Follow Up Transport: Family  Plan discussed with Pt/Family/Caregiver: Yes  Discharge Location  Discharge Placement: Home

## 2018-08-09 NOTE — PROGRESS NOTES
Hourly rounds completed and all needs were met at this time. Pt's pain controlled with PCA pump. Pt has been encouraged to ambulate with assistance. Pt remained NPO after MN. Will continue to monitor and report to oncoming nurse.

## 2018-08-10 VITALS
HEART RATE: 64 BPM | HEIGHT: 72 IN | TEMPERATURE: 97.7 F | DIASTOLIC BLOOD PRESSURE: 86 MMHG | OXYGEN SATURATION: 96 % | SYSTOLIC BLOOD PRESSURE: 157 MMHG | RESPIRATION RATE: 20 BRPM | WEIGHT: 251.7 LBS | BODY MASS INDEX: 34.09 KG/M2

## 2018-08-10 PROCEDURE — 99218 HC RM OBSERVATION: CPT

## 2018-08-10 PROCEDURE — 74011250637 HC RX REV CODE- 250/637: Performed by: UROLOGY

## 2018-08-10 PROCEDURE — 74011250637 HC RX REV CODE- 250/637: Performed by: NURSE PRACTITIONER

## 2018-08-10 PROCEDURE — 74011250636 HC RX REV CODE- 250/636: Performed by: NURSE PRACTITIONER

## 2018-08-10 PROCEDURE — 77030020263 HC SOL INJ SOD CL0.9% LFCR 1000ML

## 2018-08-10 RX ORDER — PHENAZOPYRIDINE HYDROCHLORIDE 100 MG/1
100 TABLET, FILM COATED ORAL
Qty: 21 TAB | Refills: 0 | Status: SHIPPED | OUTPATIENT
Start: 2018-08-10 | End: 2018-08-13

## 2018-08-10 RX ADMIN — ASPIRIN 81 MG: 81 TABLET, COATED ORAL at 09:20

## 2018-08-10 RX ADMIN — Medication 10 ML: at 05:12

## 2018-08-10 RX ADMIN — CARVEDILOL 6.25 MG: 6.25 TABLET, FILM COATED ORAL at 09:21

## 2018-08-10 RX ADMIN — SODIUM CHLORIDE 125 ML/HR: 900 INJECTION, SOLUTION INTRAVENOUS at 05:11

## 2018-08-10 RX ADMIN — CLOPIDOGREL BISULFATE 75 MG: 75 TABLET ORAL at 09:20

## 2018-08-10 RX ADMIN — TAMSULOSIN HYDROCHLORIDE 0.4 MG: 0.4 CAPSULE ORAL at 09:20

## 2018-08-10 RX ADMIN — MORPHINE SULFATE: 5 INJECTION, SOLUTION INTRAVENOUS at 06:56

## 2018-08-10 RX ADMIN — PANTOPRAZOLE SODIUM 40 MG: 40 TABLET, DELAYED RELEASE ORAL at 05:12

## 2018-08-10 NOTE — PROGRESS NOTES
Discharge instructions and prescriptions given and reviewed with pt, verbalizes understanding, pt to be discharged home, waiting on ride, to call desk when ready to leave.

## 2018-08-10 NOTE — PROGRESS NOTES
08/09/18 2104   Dual Skin Pressure Injury Assessment   Dual Skin Pressure Injury Assessment WDL   Second Care Provider (Based on [address] [place] [address]) Alanna Gandhi RN   Skin Integumentary   Skin Integumentary (WDL) WDL   Skin Color Appropriate for ethnicity   Skin Condition/Temp Dry; Warm   Skin Integrity Scars (comment)  (to BUE)     Pt has no evidence of skin breakdown. Pt has tattoos to his BUE, and scars to his chest and BLE. Pt is currently in bed L/L, call light in reach, and family is at bedside. Will continue to monitor at this time.

## 2018-08-10 NOTE — ANESTHESIA POSTPROCEDURE EVALUATION
Post-Anesthesia Evaluation and Assessment    Patient: Adolfo Mesa MRN: [id number]  SSN: [id number]    YOB: 1955  Age: 63 y.o. Sex: male       Cardiovascular Function/Vital Signs  Visit Vitals    /76    Pulse 100    Temp 36.7 °C (98.1 °F)    Resp 20    Ht 6' (1.829 m)    Wt 105.2 kg (232 lb)    SpO2 97%    BMI 31.46 kg/m2       Patient is status post general anesthesia for Procedure(s):  CYSTOSCOPY LEFT URETEROSCOPY LASER LITHO LEFT URETERAL STENT INSERTION/ BEING ADMITTED 8/8. Nausea/Vomiting: None    Postoperative hydration reviewed and adequate. Pain:  Pain Scale 1: Numeric (0 - 10) (08/09/18 2020)  Pain Intensity 1: 3 (08/09/18 2020)   Managed    Neurological Status:   Neuro (WDL): Within Defined Limits (08/09/18 2020)  Neuro  LUE Motor Response: Purposeful (08/09/18 2020)  LLE Motor Response: Purposeful (08/09/18 2020)  RUE Motor Response: Purposeful (08/09/18 2020)  RLE Motor Response: Purposeful (08/09/18 2020)   At baseline    Mental Status and Level of Consciousness: Arousable    Pulmonary Status:   O2 Device: Nasal cannula (08/09/18 2020)   Adequate oxygenation and airway patent    Complications related to anesthesia: None    Post-anesthesia assessment completed.  No concerns    Signed By: Elli Sellers MD     August 9, 2018

## 2018-08-10 NOTE — PROGRESS NOTES
TRANSFER - IN REPORT:    Verbal report received from Carson, RN(name) on Teagan Gracia  being received from PACU(unit) for routine progression of care      Report consisted of patients Situation, Background, Assessment and   Recommendations(SBAR). Information from the following report(s) SBAR, Kardex, OR Summary, Procedure Summary and Intake/Output MAR was reviewed with the receiving nurse. Opportunity for questions and clarification was provided. Assessment completed upon patients arrival to unit and care assumed.

## 2018-08-10 NOTE — DISCHARGE SUMMARY
Discharge Summary     Patient: Aniceto Hawthorne MRN: [id number]  SSN: [id number]    YOB: 1955  Age: 63 y.o. Sex: male      Allergies: Venom-honey bee    Admit Date: 8/8/2018    Discharge Date: 8/10/2018     * Admission Diagnoses:  Ureteral stone [N20.1]     * Discharge Diagnoses:   Hospital Problems as of 8/10/2018  Date Reviewed: 7/19/2018          Codes Class Noted - Resolved POA    Left ureteral stone ICD-10-CM: N20.1  ICD-9-CM: 592.1  8/8/2018 - Present Unknown               * Procedures for this admission:   Procedure(s):  CYSTOSCOPY LEFT URETEROSCOPY LASER LITHO LEFT URETERAL STENT INSERTION/ BEING ADMITTED 8/8      * Disposition: Home    * Discharged Condition: improved    * Hospital Course:      Per H&P:    Left sided ureteral stone on CT scan.  Stone may have moved a little more distally but is difficult to tell from the [address] still hurting and has nausea I recommend we get him admitted place him on IV fluid hydration and give pain medication. Will hydrate him overnight and get him set up tomorrow for a cystoscopy, left ureteroscopy, laser lithotripsy and stent. In the interim, we will see if he can pass stone by straining urine. No c/o dysuria or hematuria at present time. He is voiding without issue. UA from 8/6- negative for infection. Afebrile. VSS with exception of high BP likely due to pain.       He underwent cystoscopy, left ureteroscopy, laser lithotripsy, left ureteral stent insertion by Dr. Simba Mcclelland on 8/9. Stent was left in place WITH string. He will RTO in 1 week to see NP for KUB and stent with string removal.  He is voiding well and has much relief since surgery.     Patient Active Problem List   Diagnosis Code    CAD (coronary artery disease), native coronary artery I25.10    Chronic systolic heart failure ([facility]) I50.22    Extremity atherosclerosis with resting pain ([facility]) I70.229    AAA (abdominal aortic aneurysm) ([facility]) I71.4    PUD (peptic ulcer disease) K27.9    Weight loss, unintentional R63.4    Popliteal artery aneurysm, bilateral History I72.4    Anticoagulation monitoring, INR range 2-3 Z79.01    PVD (peripheral vascular disease) ([facility]) I73.9    COPD (chronic obstructive pulmonary disease) ([facility]) J44.9    Hypertension I10    Hyperlipidemia E78.5    GERD (gastroesophageal reflux disease) K21.9    Anxiety F41.9    Arthritis M19.90    S/P AAA (abdominal aortic aneurysm) repair P60.030, Z86.79    Tobacco abuse Z72.0    ST elevation myocardial infarction (STEMI) of inferior wall ([facility]) K46.15    Systolic CHF, acute on chronic ([facility]) I50.23    S/P CABG (coronary artery bypass graft) Z95.1    CVA Personal Hx of TIA\T\CVA w/o residual deficit I63.9    Claudication, Symptom, pain relieved by rest I73.9    Precordial pain R07.2    Left ureteral stone N20.1       Discharge Medications:   Discharge Medication List as of 8/10/2018 11:22 AM      START taking these medications    Details   phenazopyridine (PYRIDIUM) 100 mg tablet Take 1 Tab by mouth three (3) times daily as needed for Pain for up to 3 days. , Print, Disp-21 Tab, R-0         CONTINUE these medications which have NOT CHANGED    Details   pravastatin (PRAVACHOL) 20 mg tablet Historical Med      dicyclomine (BENTYL) 10 mg capsule Historical Med      NARCAN 4 mg/actuation nasal spray Historical Med, CRISTI      ondansetron hcl (ZOFRAN) 4 mg tablet Take 1 Tab by mouth every eight (8) hours as needed for Nausea. , Print, Disp-12 Tab, R-0      lisinopril (PRINIVIL, ZESTRIL) 5 mg tablet Take  by mouth daily. , Historical Med      clopidogrel (PLAVIX) 75 mg tablet Take 1 Tab by mouth daily. , Normal, Disp-30 Tab, R-11      HYDROcodone-acetaminophen (NORCO)  mg tablet Take 1 Tab by mouth every four (4) hours as needed. Max Daily Amount: 6 Tabs., No Print, Disp-20 Tab, R-0      omeprazole (PRILOSEC) 40 mg capsule Take 40 mg by mouth daily. , Historical Med      aspirin delayed-release 81 mg tablet Take  by mouth daily. , Historical Med      carvedilol (COREG) 6.25 mg tablet Take 1 Tab by mouth two (2) times daily (with meals). , Print, Disp-60 Tab, R-11      atorvastatin (LIPITOR) 80 mg tablet Take 1 Tab by mouth daily. , Print, Disp-30 Tab, R-11      zolpidem (AMBIEN) 10 mg tablet Take 10 mg by mouth nightly as needed., Historical Med      diazepam (VALIUM) 5 mg tablet Take 5 mg by mouth every twelve (12) hours as needed., Historical Med      diphenhydrAMINE (ALLERGY) 25 mg capsule Take 25 mg by mouth every six (6) hours as needed., Historical Med      acetaminophen (TYLENOL) 500 mg tablet Take  by mouth every six (6) hours as needed for Pain., Historical Med      NITROSTAT 0.4 mg SL tablet Historical Med, CRISTI         STOP taking these medications       HYDROcodone-acetaminophen (NORCO) 7.5-325 mg per tablet Comments:   Reason for Stopping:                * Follow-up Care/Patient Instructions: Activity: Activity as tolerated, no driving while taking pain medication, stay hydrated. Diet: Regular Diet  Wound Care: None needed    Follow-up Information     Follow up With Details Comments [address], [place]   [address]  Raeann Tompkins MD  office will call patient with appointment date and time. [address] [place] [address]  [phone]             Signed By: Suzanne Pedraza NP     August 10, 2018       I have reviewed the above note and examined the patient. I agree with the exam, assessment and plan.     Redd Amaya MD

## 2018-08-10 NOTE — PROGRESS NOTES
Admit Date: 8/8/2018    Subjective:     Mr. Alvino Villegas is doing well. He is voiding well and reports relief since his procedure. Objective:     Patient Vitals for the past 8 hrs:   BP Temp Pulse Resp SpO2 Height Weight   08/10/18 0711 120/70 98.4 °F (36.9 °C) 74 20 92 % - -   08/10/18 0551 - - - - - 6' (1.829 m) 251 lb 11.2 oz (114.2 kg)   08/10/18 0400 117/60 98.1 °F (36.7 °C) 89 18 91 % - -     08/10 0701 - 08/10 1900  In: 120 [P.O.:120]  Out: -   08/08 1901 - 08/10 0700  In: 8657 [P.O.:150; I.V.:1200]  Out: 5150 [Urine:5150]    Physical Exam:  GENERAL: alert, cooperative, no distress  LUNG: clear to auscultation bilaterally  HEART: regular rate and rhythm, S1, S2   ABDOMEN: soft, non-tender. Active BS  NEUROLOGIC: AOx3    Data Review No results found for this or any previous visit (from the past 24 hour(s)). Assessment:     Active Problems:    Left ureteral stone (8/8/2018)      POD 1 Left ureteroscopy with holmium laser lithotripsy of ureteral calculi and placement of left double-J ureteral stent. VSS, afebrile. Plan:     Discharge home today. He will follow-up with NP in about 1 week with KUB and stent WITH string removal.  Office will notify him of date/time. Richard Joseph NP  I have reviewed the above note and examined the patient. I agree with the exam, assessment and plan.     Dawna Williamson MD

## 2018-08-10 NOTE — DISCHARGE INSTRUCTIONS
DISCHARGE SUMMARY from Nurse    PATIENT INSTRUCTIONS:    After general anesthesia or intravenous sedation, for 24 hours or while taking prescription Narcotics:  · Limit your activities  · Do not drive and operate hazardous machinery  · Do not make important personal or business decisions  · Do  not drink alcoholic beverages  · If you have not urinated within 8 hours after discharge, please contact your surgeon on call. Report the following to your surgeon:  · Excessive pain, swelling, redness or odor of or around the surgical area  · Temperature over 100.5  · Nausea and vomiting lasting longer than 4 hours or if unable to take medications  · Any signs of decreased circulation or nerve impairment to extremity: change in color, persistent  numbness, tingling, coldness or increase pain  · Any questions    What to do at Home:  Recommended activity: Activity as tolerated    If you experience any of the following symptoms Difficulty urinating, fever greater than 101, increased blood in urine, or pain, please follow up with Dr. Eleazar Alberto. *  Please give a list of your current medications to your Primary Care Provider. *  Please update this list whenever your medications are discontinued, doses are      changed, or new medications (including over-the-counter products) are added. *  Please carry medication information at all times in case of emergency situations. These are general instructions for a healthy lifestyle:    No smoking/ No tobacco products/ Avoid exposure to second hand smoke  Surgeon General's Warning:  Quitting smoking now greatly reduces serious risk to your health.     Obesity, smoking, and sedentary lifestyle greatly increases your risk for illness    A healthy diet, regular physical exercise & weight monitoring are important for maintaining a healthy lifestyle    You may be retaining fluid if you have a history of heart failure or if you experience any of the following symptoms:  Weight gain of 3 pounds or more overnight or 5 pounds in a week, increased swelling in our hands or feet or shortness of breath while lying flat in bed. Please call your doctor as soon as you notice any of these symptoms; do not wait until your next office visit. Recognize signs and symptoms of STROKE:    F-face looks uneven    A-arms unable to move or move unevenly    S-speech slurred or non-existent    T-time-call 911 as soon as signs and symptoms begin-DO NOT go       Back to bed or wait to see if you get better-TIME IS BRAIN. Warning Signs of HEART ATTACK     Call 911 if you have these symptoms:   Chest discomfort. Most heart attacks involve discomfort in the center of the chest that lasts more than a few minutes, or that goes away and comes back. It can feel like uncomfortable pressure, squeezing, fullness, or pain.  Discomfort in other areas of the upper body. Symptoms can include pain or discomfort in one or both arms, the back, neck, jaw, or stomach.  Shortness of breath with or without chest discomfort.  Other signs may include breaking out in a cold sweat, nausea, or lightheadedness. Don't wait more than five minutes to call 911 - MINUTES MATTER! Fast action can save your life. Calling 911 is almost always the fastest way to get lifesaving treatment. Emergency Medical Services staff can begin treatment when they arrive -- up to an hour sooner than if someone gets to the hospital by car. The discharge information has been reviewed with the patient. The patient verbalized understanding. Discharge medications reviewed with the patient and appropriate educational materials and side effects teaching were provided.   ___________________________________________________________________________________________________________________________________  [employer] [facility]: Care Instructions  Your Care Instructions    Kidney stones are formed when salts, minerals, and other substances normally found in the urine clump together. They can be as small as grains of sand or, rarely, as large as golf balls. While the stone is traveling through the ureter, which is the tube that carries urine from the kidney to the bladder, you will probably feel pain. The pain may be mild or very severe. You may also have some blood in your urine. As soon as the stone reaches the bladder, any intense pain should go away. If a stone is too large to pass on its own, you may need a medical procedure to help you pass the stone. The doctor has checked you carefully, but problems can develop later. If you notice any problems or new symptoms, get medical treatment right away. Follow-up care is a key part of your treatment and safety. Be sure to make and go to all appointments, and call your doctor if you are having problems. It's also a good idea to know your test results and keep a list of the medicines you take. How can you care for yourself at home? · Drink plenty of fluids, enough so that your urine is light yellow or clear like water. If you have kidney, heart, or liver disease and have to limit fluids, talk with your doctor before you increase the amount of fluids you drink. · Take pain medicines exactly as directed. Call your doctor if you think you are having a problem with your medicine. ¨ If the doctor gave you a prescription medicine for pain, take it as prescribed. ¨ If you are not taking a prescription pain medicine, ask your doctor if you can take an over-the-counter medicine. Read and follow all instructions on the label. · Your doctor may ask you to strain your urine so that you can collect your kidney stone when it passes. You can use a kitchen strainer or a tea strainer to catch the stone. Store it in a plastic bag until you see your doctor again. Preventing future kidney stones  Some changes in your diet may help prevent kidney stones.  Depending on the cause of your stones, your doctor may recommend that you:  · Drink plenty of fluids, enough so that your urine is light yellow or clear like water. If you have kidney, heart, or liver disease and have to limit fluids, talk with your doctor before you increase the amount of fluids you drink. · Limit coffee, tea, and alcohol. Also avoid grapefruit juice. · Do not take more than the recommended daily dose of vitamins C and D.  · Avoid antacids such as Gaviscon, Maalox, Mylanta, or Tums. · Limit the amount of salt (sodium) in your diet. · Eat a balanced diet that is not too high in protein. · Limit foods that are high in a substance called oxalate, which can cause kidney stones. These foods include dark green vegetables, rhubarb, chocolate, wheat bran, nuts, cranberries, and beans. When should you call for help? Call your doctor now or seek immediate medical care if:    · You cannot keep down fluids.     · Your pain gets worse.     · You have a fever or chills.     · You have new or worse pain in your back just below your rib cage (the flank area).     · You have new or more blood in your urine.    Watch closely for changes in your health, and be sure to contact your doctor if:    · You do not get better as expected. Where can you learn more? Go to http://elda-brea.info/. Enter T237 in the search box to learn more about \"Kidney Stone: Care Instructions. \"  Current as of: May 12, 2017  Content Version: 11.7  © 6058-9901 [employer]. Care instructions adapted under license by [employer] (which disclaims liability or warranty for this information). If you have questions about a medical condition or this instruction, always ask your healthcare professional. [place] [address] any warranty or liability for your use of this information.

## 2018-08-10 NOTE — PROGRESS NOTES
Hourly rounds completed and all needs were met at this time. Pt's output was 3000 ml this shift, appearance red/clear. Pt currently using the PCA pump for pain management, Pt tolerated well. Pt has been encouraged to ambulate with assistance. Will continue to monitor and report to oncoming nurse.

## 2018-08-10 NOTE — OP NOTES
[facility] REPORT    Jovanna Pascual  MR#: [id number]  : 1955  ACCOUNT #: [de-identified]   DATE OF SERVICE: 2018    PREOPERATIVE DIAGNOSIS:  Left ureteral calculus. POSTOPERATIVE DIAGNOSIS:  Left ureteral calculi. PROCEDURES PERFORMED:  Left ureteroscopy with holmium laser lithotripsy of ureteral calculi and placement of left double-J ureteral stent. SURGEON:  Tommy Garcia MD    FINDINGS:  Two stones measuring about 5 mm each located at the left pelvic brim. DESCRIPTION OF PROCEDURE:  The patient was given a general anesthetic, placed in the dorsal lithotomy position. He was prepped and draped in sterile fashion. Fluoroscopy was performed. I did not see any obvious calcified stones by fluoroscopy. A 22-Swazi cystoscope was advanced into the urethra using the video camera and 30-degree lens. The urethra was normal.  Prostate shows moderate hypertrophy. Bladder was entered. There were no stones or tumors noted in the bladder. A 0.038 floppy tip guidewire was passed up the left ureter using fluoroscopy. This met resistance at the left pelvic brim area but no evidence of any calcified stones were noted. I then passed a 6.5-Swazi semirigid ureteroscope alongside the guidewire. This was passed up to the entry into the left distal ureter. There were at least 2 stones in this area. Each measured 4-5 mm in diameter. A 365 micron holmium laser fiber was used at power setting 0.5 joules and 5 Hz. Stones were completely fragmented. They seemed to be fairly dense. There was no evidence of any ureteral trauma. I ran the scope up to the proximal ureter and did not see any further stones. The scope was carefully withdrawn leaving the wire in place. The wire was backloaded in the cystoscope and a 7-Swazi 26 cm long double-J stent was placed successfully and left in good position.   The suture was left attached to the distal end of the stent. This was left protruding from the urethral meatus. He was taken to recovery room in stable condition. He will be transferred back to the floor. ANESTHESIA:  General.    SPECIMENS REMOVED:  None. IMPLANTS:  Left ureteral stent. ASSISTANT:  None. ESTIMATED BLOOD LOSS:  None. COMPLICATIONS:  None.       MD Kevin Alanis / LATISHA  D: 08/09/2018 19:52     T: 08/10/2018 08:16  JOB #: [id number]

## 2019-01-01 ENCOUNTER — HOSPITAL ENCOUNTER (EMERGENCY)
Age: 64
Discharge: HOME | End: 2019-01-01
Attending: EMERGENCY MEDICINE
Payer: MEDICARE

## 2019-01-01 VITALS
HEIGHT: 72 IN | TEMPERATURE: 98.1 F | HEART RATE: 91 BPM | DIASTOLIC BLOOD PRESSURE: 89 MMHG | RESPIRATION RATE: 19 BRPM | OXYGEN SATURATION: 98 % | SYSTOLIC BLOOD PRESSURE: 155 MMHG | BODY MASS INDEX: 31.83 KG/M2 | WEIGHT: 235 LBS

## 2019-01-01 DIAGNOSIS — S80.12XA CONTUSION OF LEFT LEG, INITIAL ENCOUNTER: Primary | ICD-10-CM

## 2019-01-01 PROCEDURE — 99283 EMERGENCY DEPT VISIT LOW MDM: CPT | Performed by: EMERGENCY MEDICINE

## 2019-01-02 NOTE — ED TRIAGE NOTES
Pt walked into triage c/o left leg numbness/tingling radiate to his toes and calf. Pt states there is knot on his ankle. No trauma. Tender to touch. Positive pedal pulse. Pt has graft of left leg for PVD 4 geni years ago. Denies cp or sob Pt hypertensive in triage. States he took his bp meds as usual. Pt states he took a norco for his pain, but didn't help

## 2019-01-02 NOTE — DISCHARGE INSTRUCTIONS
Contusion: Care Instructions  Your Care Instructions  Contusion is the medical term for a bruise. It is the result of a direct blow or an impact, such as a fall. Contusions are common sports injuries. Most people think of a bruise as a black-and-blue spot. This happens when small blood vessels get torn and leak blood under the skin. But bones, muscles, and organs can also get bruised. This may damage deep tissues but not cause a bruise you can see. The doctor will do a physical exam to find the location of your contusion. You may also have tests to make sure you do not have a more serious injury, such as a broken bone or nerve damage. These may include X-rays or other imaging tests like a CT scan or MRI. Deep-tissue contusions may cause pain and swelling. But if there is no serious damage, they will often get better in a few weeks with home treatment. The doctor has checked you carefully, but problems can develop later. If you notice any problems or new symptoms, get medical treatment right away. Follow-up care is a key part of your treatment and safety. Be sure to make and go to all appointments, and call your doctor if you are having problems. It's also a good idea to know your test results and keep a list of the medicines you take. How can you care for yourself at home? · Put ice or a cold pack on the sore area for 10 to 20 minutes at a time to stop swelling. Put a thin cloth between the ice pack and your skin. · Be safe with medicines. Read and follow all instructions on the label. ¨ If the doctor gave you a prescription medicine for pain, take it as prescribed. ¨ If you are not taking a prescription pain medicine, ask your doctor if you can take an over-the-counter medicine. · If you can, prop up the sore area on pillows as much as possible for the next few days. Try to keep the sore area above the level of your heart. When should you call for help?   Call your doctor now or seek immediate medical care if:  · Your pain gets worse. · You have new or worse swelling. · You have tingling, weakness, or numbness in the area near the contusion. · The area near the contusion is cold or pale. Watch closely for changes in your health, and be sure to contact your doctor if:  · You do not get better as expected. Where can you learn more? Go to [employer].be  Enter [id number] in the search box to learn more about \"Contusion: Care Instructions. \"   © 0029-1014 [employer]. Care instructions adapted under license by [employer] (which disclaims liability or warranty for this information). This care instruction is for use with your licensed healthcare professional. If you have questions about a medical condition or this instruction, always ask your healthcare professional. [address] any warranty or liability for your use of this information. Content Version: 55.9.024180; Current as of: May 22, 2015           Learning About RICE (Rest, Ice, Compression, and Elevation)  What is RICE? RICE is a way to care for an injury. RICE helps relieve pain and swelling. It may also help with healing and flexibility. RICE stands for:  · Rest and protect the injured or sore area. · Ice or a cold pack used as soon as possible. · Compression, or wrapping the injured or sore area with an elastic bandage. · Elevation (propping up) the injured or sore area. How do you do RICE? You can use RICE for home treatment when you have general aches and pains or after an injury or surgery. Rest  · Do not put weight on the injury for at least 24 to 48 hours. · Use crutches for a badly sprained knee or ankle. · Support a sprained wrist, elbow, or shoulder with a sling. Ice  · Put ice or a cold pack on the injury right away to reduce pain and swelling. Frozen vegetables will also work as an ice pack. Put a thin cloth between the ice or cold pack and your skin.  The cloth protects the injured area from getting too cold. · Use ice for 10 to 15 minutes at a time for the first 48 to 72 hours. Compression  · Use compression for sprains, strains, and surgeries of the arms and legs. · Wrap the injured area with an elastic bandage or compression sleeve to reduce swelling. · Don't wrap it too tightly. If the area below it feels numb, tingles, or feels cool, loosen the wrap. Elevation  · Use elevation for areas of the body that can be propped up, such as arms and legs. · Prop up the injured area on pillows whenever you use ice. Keep it propped up anytime you sit or lie down. · Try to keep the injured area at or above the level of your heart. This will help reduce swelling and bruising. Where can you learn more? Go to http://elda-brea.info/. Enter T990 in the search box to learn more about \"Learning About RICE (Rest, Ice, Compression, and Elevation). \"  Current as of: November 29, 2017  Content Version: 11.8  © 0385-1218 [employer]. Care instructions adapted under license by [employer] (which disclaims liability or warranty for this information). If you have questions about a medical condition or this instruction, always ask your healthcare professional. [address] any warranty or liability for your use of this information.

## 2019-01-02 NOTE — ED NOTES
I have reviewed discharge instructions with the patient. The patient verbalized understanding. Patient left ED via Discharge Method: ambulatory to Home with self. Opportunity for questions and clarification provided. Patient given 0 scripts. To continue your aftercare when you leave the hospital, you may receive an automated call from our care team to check in on how you are doing. This is a free service and part of our promise to provide the best care and service to meet your aftercare needs.  If you have questions, or wish to unsubscribe from this service please call [phone]. Thank you for Choosing our [facility] Emergency Department.

## 2019-01-02 NOTE — ED PROVIDER NOTES
Chief complaint : swelling HISTORY OF PRESENT ILLNESS : 
Location : left lower leg, tibia, above ankle Quality : painful Quantity : first time ever, once Timing : few hours Severity : moderate, but improving (both size, and pain) Alleviating / exacerbating factors : hurts to touch Applied some warm compresses Associated Symptoms : no numbness No trauma that he can recall Past Medical History:  
Diagnosis Date  AAA (abdominal aortic aneurysm) ([address] 9/23/2011 9/29/11 AORTIC ABDOMINAL ANUERYSM REPAIR ENDOVASCULAR (EVAR)-  Jayesh De La Torre 4.6cm on US 9/23/11  Acute myocardial infarction [facility]) February, 2010 MI 2/10 - Quad bypass 2/25/10 ,4/2016  Anticoagulation monitoring, INR range 2-3 10/5/2011  Anxiety 11/3/2011  Arthritis 11/3/2011  CAD (coronary artery disease)  CAD (coronary artery disease), native coronary artery 2/24/2010  Cardiomyopathy ([address] 2/24/2010  Chest discomfort 06/16/15 Tightness, Pressure. Jan 2014:  stress MPI with Lexiscan - normal perfusion, LVEF 52%  Chronic systolic heart failure ([address] 2/24/2010  Claudication ([address] 06/16/15 Claudication, symptom, pain relieved by rest  
 Congestive heart failure, unspecified  COPD (chronic obstructive pulmonary disease) ([address] 11/3/2011  Extremity atherosclerosis with resting pain ([address] 9/23/2011 9/28/11 ; Candido Newton- Dr. Liyah De La Torre  GERD (gastroesophageal reflux disease) 11/3/2011  History of AAA (abdominal aortic aneurysm) repair ? ??  
 including right femoral artery  Hypercholesterolemia  Hyperlipidemia 11/3/2011  Hypertension 11/3/2011  Popliteal artery aneurysm, bilateral History 9/23/2011 L Pop A aneurysm stent graft 9/8/11- Dr. Janeen GUPTA aneurysm repair  Psychiatric disorder   
 anxiety  PUD (peptic ulcer disease) ? ??  
 hx bleeding gastric ulcers  PVD (peripheral vascular disease) ([facility] 11/3/2011  S/P AAA (abdominal aortic aneurysm) repair 12/11/2013 EVAR  
 S/P CABG (coronary artery bypass graft) 4/26/2016  Tobacco abuse 4/23/2014  Weight loss, unintentional 9/23/2011 Past Surgical History:  
Procedure Laterality Date  BYPASS GRAFT OTHR,FEM-POP  03/18/2011  
 right leg  BYPASS GRAFT OTHR,FEM-TIBIAL  2012 LLE bypass pop aneurysm  CABG, ARTERY-VEIN, FIVE    
 2010  CABG, ARTERY-VEIN, FOUR  2010  HX AAA REPAIR    
 10/2011  HX CHOLECYSTECTOMY  2012  HX FEMORAL BYPASS Left bypass stent graft  LAP,CHOLECYSTECTOMY    
 09/06/11 Family History:  
Problem Relation Age of Onset  Heart Disease Mother  Hypertension Mother  Cancer Maternal Grandmother  Heart Disease Maternal Grandfather  Hypertension Maternal Uncle  Diabetes Maternal Uncle  Diabetes Brother  Cancer Paternal Uncle Social History Socioeconomic History  Marital status:  Spouse name: Not on file  Number of children: Not on file  Years of education: Not on file  Highest education level: Not on file Social Needs  Financial resource strain: Not on file  Food insecurity - worry: Not on file  Food insecurity - inability: Not on file  Transportation needs - medical: Not on file  Transportation needs - non-medical: Not on file Occupational History  Not on file Tobacco Use  Smoking status: Current Every Day Smoker Packs/day: 0.50 Years: 40.00 Pack years: 20.00 Types: Cigarettes  Smokeless tobacco: Current User Substance and Sexual Activity  Alcohol use: No  
 Drug use: Yes Types: Marijuana, Prescription Comment: very rare  Sexual activity: Not on file Other Topics Concern  Not on file Social History Narrative  Not on file ALLERGIES: Venom-honey bee Review of Systems Musculoskeletal: Positive for arthralgias and myalgias. Negative for joint swelling. Skin: Negative for color change, rash and wound. Neurological: Negative for weakness and numbness. Vitals:  
 01/01/19 2116 01/01/19 2343 BP: (!) 178/110 155/89 Pulse:  91  
Resp: 18 19 Temp: 97.7 °F (36.5 °C) 98.1 °F (36.7 °C) SpO2: 95% 98% Weight: 106.6 kg (235 lb) Height: 6' (1.829 m) Physical Exam  
Constitutional: He is oriented to person, place, and time. He appears well-developed and well-nourished. No distress. HENT:  
Head: Normocephalic and atraumatic. Eyes: Conjunctivae and EOM are normal. Right eye exhibits no discharge. Left eye exhibits no discharge. Neck: Normal range of motion. Neck supple. Cardiovascular: Normal rate and regular rhythm. Pulses: 
     Dorsalis pedis pulses are 1+ on the left side. Posterior tibial pulses are 1+ on the left side. Left pedal pulses requierd doppler to find Pulmonary/Chest: Effort normal. No respiratory distress. Musculoskeletal: Normal range of motion. He exhibits tenderness. He exhibits no edema or deformity. Left ankle: He exhibits swelling. Tenderness. Left lower leg: He exhibits tenderness. Legs: 
     Feet: 
 
Neurological: He is alert and oriented to person, place, and time. He has normal strength. He exhibits normal muscle tone. cni 2-12 grossly Nl gait, Nl speech Skin: Skin is warm and dry. No rash noted. He is not diaphoretic. No erythema. Psychiatric: He has a normal mood and affect. His behavior is normal.  
Nursing note and vitals reviewed. MDM Number of Diagnoses or Management Options Contusion of left leg, initial encounter:  
Diagnosis management comments: Medical decision making note: 
Shin contusion in pt with h/o vascular bypass in leg. Suspect occult trauma, mild enough he doesn't recall striking leg 
dopplereable pulses Pain and swelling BOTH are improving spontaneously Not suspect for dvt This concludes the \"medical decision making note\" part of this emergency department visit note. Procedures

## 2019-01-21 ENCOUNTER — HOSPITAL ENCOUNTER (OUTPATIENT)
Dept: LAB | Age: 64
Discharge: HOME | End: 2019-01-21
Payer: MEDICARE

## 2019-01-21 DIAGNOSIS — E78.5 HYPERLIPIDEMIA, UNSPECIFIED HYPERLIPIDEMIA TYPE: Chronic | ICD-10-CM

## 2019-01-21 LAB
ALBUMIN SERPL-MCNC: 3.8 G/DL (ref 3.2–4.6)
ALBUMIN/GLOB SERPL: 1.1 {RATIO}
ALP SERPL-CCNC: 163 U/L (ref 50–136)
ALT SERPL-CCNC: 47 U/L (ref 12–65)
AST SERPL-CCNC: 18 U/L (ref 15–37)
BILIRUB DIRECT SERPL-MCNC: 0.1 MG/DL
BILIRUB SERPL-MCNC: 0.5 MG/DL (ref 0.2–1.1)
CHOLEST SERPL-MCNC: 264 MG/DL
GLOBULIN SER CALC-MCNC: 3.6 G/DL
HDLC SERPL-MCNC: 25 MG/DL (ref 40–60)
HDLC SERPL: 10.6 {RATIO}
LDLC SERPL CALC-MCNC: ABNORMAL MG/DL
LDLC SERPL DIRECT ASSAY-MCNC: 113 MG/DL
LIPID PROFILE,FLP: ABNORMAL
PROT SERPL-MCNC: 7.4 G/DL (ref 6.3–8.2)
TRIGL SERPL-MCNC: 824 MG/DL (ref 35–150)
VLDLC SERPL CALC-MCNC: 164.8 MG/DL (ref 6–23)

## 2019-01-21 PROCEDURE — 80061 LIPID PANEL: CPT

## 2019-01-21 PROCEDURE — 83721 ASSAY OF BLOOD LIPOPROTEIN: CPT

## 2019-01-21 PROCEDURE — 80076 HEPATIC FUNCTION PANEL: CPT

## 2019-01-21 PROCEDURE — 36415 COLL VENOUS BLD VENIPUNCTURE: CPT

## 2019-03-13 ENCOUNTER — APPOINTMENT (OUTPATIENT)
Dept: GENERAL RADIOLOGY | Age: 64
End: 2019-03-13
Attending: EMERGENCY MEDICINE
Payer: MEDICARE

## 2019-03-13 ENCOUNTER — HOSPITAL ENCOUNTER (EMERGENCY)
Age: 64
Discharge: HOME | End: 2019-03-13
Attending: EMERGENCY MEDICINE
Payer: MEDICARE

## 2019-03-13 ENCOUNTER — APPOINTMENT (OUTPATIENT)
Dept: ULTRASOUND IMAGING | Age: 64
End: 2019-03-13
Attending: EMERGENCY MEDICINE
Payer: MEDICARE

## 2019-03-13 VITALS
SYSTOLIC BLOOD PRESSURE: 185 MMHG | RESPIRATION RATE: 16 BRPM | HEART RATE: 75 BPM | OXYGEN SATURATION: 97 % | HEIGHT: 72 IN | DIASTOLIC BLOOD PRESSURE: 113 MMHG | TEMPERATURE: 97.7 F | BODY MASS INDEX: 31.02 KG/M2 | WEIGHT: 229 LBS

## 2019-03-13 DIAGNOSIS — S80.11XA CONTUSION OF RIGHT CALF, INITIAL ENCOUNTER: Primary | ICD-10-CM

## 2019-03-13 LAB
ANION GAP SERPL CALC-SCNC: 7 MMOL/L (ref 7–16)
BASOPHILS # BLD: 0.1 K/UL (ref 0–0.2)
BASOPHILS NFR BLD: 1 % (ref 0–2)
BUN SERPL-MCNC: 20 MG/DL (ref 8–23)
CALCIUM SERPL-MCNC: 9 MG/DL (ref 8.3–10.4)
CHLORIDE SERPL-SCNC: 110 MMOL/L (ref 98–107)
CO2 SERPL-SCNC: 22 MMOL/L (ref 21–32)
CREAT SERPL-MCNC: 1.26 MG/DL (ref 0.8–1.5)
DIFFERENTIAL METHOD BLD: ABNORMAL
EOSINOPHIL # BLD: 0.3 K/UL (ref 0–0.8)
EOSINOPHIL NFR BLD: 3 % (ref 0.5–7.8)
ERYTHROCYTE [DISTWIDTH] IN BLOOD BY AUTOMATED COUNT: 12.4 % (ref 11.9–14.6)
GLUCOSE SERPL-MCNC: 120 MG/DL (ref 65–100)
HCT VFR BLD AUTO: 49.4 % (ref 41.1–50.3)
HGB BLD-MCNC: 16.7 G/DL (ref 13.6–17.2)
IMM GRANULOCYTES # BLD AUTO: 0.1 K/UL (ref 0–0.5)
IMM GRANULOCYTES NFR BLD AUTO: 1 % (ref 0–5)
LYMPHOCYTES # BLD: 2.1 K/UL (ref 0.5–4.6)
LYMPHOCYTES NFR BLD: 24 % (ref 13–44)
MCH RBC QN AUTO: 29 PG (ref 26.1–32.9)
MCHC RBC AUTO-ENTMCNC: 33.8 G/DL (ref 31.4–35)
MCV RBC AUTO: 85.8 FL (ref 79.6–97.8)
MONOCYTES # BLD: 0.5 K/UL (ref 0.1–1.3)
MONOCYTES NFR BLD: 5 % (ref 4–12)
NEUTS SEG # BLD: 5.9 K/UL (ref 1.7–8.2)
NEUTS SEG NFR BLD: 67 % (ref 43–78)
NRBC # BLD: 0 K/UL (ref 0–0.2)
PLATELET # BLD AUTO: 243 K/UL (ref 150–450)
PMV BLD AUTO: 9 FL (ref 9.4–12.3)
POTASSIUM SERPL-SCNC: 4.1 MMOL/L (ref 3.5–5.1)
RBC # BLD AUTO: 5.76 M/UL (ref 4.23–5.6)
SODIUM SERPL-SCNC: 139 MMOL/L (ref 136–145)
WBC # BLD AUTO: 8.8 K/UL (ref 4.3–11.1)

## 2019-03-13 PROCEDURE — 85025 COMPLETE CBC W/AUTO DIFF WBC: CPT

## 2019-03-13 PROCEDURE — 73590 X-RAY EXAM OF LOWER LEG: CPT

## 2019-03-13 PROCEDURE — 93971 EXTREMITY STUDY: CPT

## 2019-03-13 PROCEDURE — 80048 BASIC METABOLIC PNL TOTAL CA: CPT

## 2019-03-13 PROCEDURE — 99283 EMERGENCY DEPT VISIT LOW MDM: CPT | Performed by: EMERGENCY MEDICINE

## 2019-03-13 NOTE — ED PROVIDER NOTES
67 Yo male with right leg pain after \"needling\" for pain management 2 days ago. States pain to right calf worsening since that time. Denies any fevers or chills, no redness. States he feels it is swollen significantly from baseline. States history of vascular surgery to bilateral lower extremities. Patient seen by me briefly in triage to begin workup until further evaluation and management by another provider once a room becomes available. The history is provided by the patient. Leg Pain    This is a new problem. The current episode started 2 days ago. The problem occurs constantly. The problem has not changed since onset. The pain is present in the right lower leg. The quality of the pain is described as aching. The pain is at a severity of 10/10. The pain is severe. Pertinent negatives include no numbness, full range of motion, no stiffness, no tingling and no itching. The symptoms are aggravated by palpation, standing and movement. He has tried nothing for the symptoms. History of extremity trauma: Patient had a nerve velocity conduction study done on the right lower extremity 3 days ago. Past Medical History:   Diagnosis Date    AAA (abdominal aortic aneurysm) ([address] 9/23/2011 9/29/11 AORTIC ABDOMINAL ANUERYSM REPAIR ENDOVASCULAR (EVAR)-  / Yasmine 4.6cm on US 9/23/11     Acute myocardial infarction [facility]) February, 2010    MI 2/10 - Quad bypass 2/25/10 ,4/2016    Anticoagulation monitoring, INR range 2-3 10/5/2011    Anxiety 11/3/2011    Arthritis 11/3/2011    CAD (coronary artery disease)     CAD (coronary artery disease), native coronary artery 2/24/2010    Cardiomyopathy ([address] 2/24/2010    Chest discomfort 06/16/15    Tightness, Pressure.   Jan 2014:  stress MPI with Lexiscan - normal perfusion, LVEF 52%    Chronic systolic heart failure ([address] 2/24/2010    Claudication ([address] 06/16/15    Claudication, symptom, pain relieved by rest    Congestive heart failure, unspecified  COPD [facility]ch[facility] obstructive [facility] disease) [facility] 11/3/2011    Extremity atherosclerosis with resting pain ([facility] 9/23/2011 9/28/11 ; Antonio Locke Dr. [place] GERD (gastroesophageal reflux disease) 11/3/2011    History of AAA (abdominal aortic aneurysm) repair ? ??    including right femoral artery    Hypercholesterolemia     Hyperlipidemia 11/3/2011    Hypertension 11/3/2011    Popliteal artery aneurysm, bilateral History 9/23/2011    L Pop A aneurysm stent graft 9/8/11- Dr. Marvin Pollock Pop A aneurysm repair      Psychiatric disorder     anxiety    PUD (peptic ulcer disease) ? ??    hx bleeding gastric ulcers     PVD (peripheral vascular disease) ([facility] 11/3/2011    S/P AAA (abdominal aortic aneurysm) repair 12/11/2013    [facility]    S/P CABG (coronary artery bypass graft) 4/26/2016    Tobacco abuse 4/23/2014    Weight loss, unintentional 9/23/2011       Past Surgical History:   Procedure Laterality Date    BYPASS GRAFT OTHR,FEM-POP  03/18/2011    right leg    BYPASS GRAFT OTHR,FEM-TIBIAL  2012    LLE bypass pop aneurysm    CABG, ARTERY-VEIN, FIVE      2010    CABG, ARTERY-VEIN, FOUR  2010    HX AAA REPAIR      10/2011    HX CHOLECYSTECTOMY  2012    HX FEMORAL BYPASS      Left bypass stent graft    LAP,CHOLECYSTECTOMY      09/06/11         Family History:   Problem Relation Age of Onset    Heart Disease Mother     Hypertension Mother     Cancer Maternal Grandmother     Heart Disease Maternal Grandfather     Hypertension Maternal Uncle     Diabetes Maternal Uncle     Diabetes Brother     Cancer Paternal Uncle        Social History     Socioeconomic History    Marital status:      Spouse name: Not on file    Number of children: Not on file    Years of education: Not on file    Highest education level: Not on file   Social Needs    Financial resource strain: Not on file    Food insecurity - worry: Not on file  Food insecurity - inability: Not on file    Transportation needs - medical: Not on file   [employer] needs - non-medical: Not on file   Occupational History    Not on file   Tobacco Use    Smoking status: Current Every Day Smoker     Packs/day: 0.50     Years: 40.00     Pack years: 20.00     Types: Cigarettes    Smokeless tobacco: Current User   Substance and Sexual Activity    Alcohol use: No    Drug use: Yes     Types: Marijuana, Prescription     Comment: very rare    Sexual activity: Not on file   Other Topics Concern    Not on file   Social History Narrative    Not on file         ALLERGIES: Venom-honey bee    Review of Systems   Constitutional: Negative for chills and fever. Musculoskeletal: Negative for stiffness. Skin: Negative for itching. Neurological: Negative for tingling and numbness. All other systems reviewed and are negative. Vitals:    03/13/19 1249   BP: (!) 174/104   Pulse: 80   Resp: 16   Temp: 97.7 °F (36.5 °C)   SpO2: 97%   Weight: 103.9 kg (229 lb)   Height: 6' (1.829 m)            Physical Exam   Constitutional: He is oriented to person, place, and time. He appears well-developed and well-nourished. No distress. HENT:   Head: Normocephalic and atraumatic. Musculoskeletal: Normal range of motion. He exhibits tenderness. He exhibits no edema or deformity. DP pulse present bilateral lower extremities;there is tenderness noted to the right calf. Palpable mass in the popliteal fossa; no palpable mass within the muscle belly. Neurological: He is alert and oriented to person, place, and time. Skin: Skin is warm and dry. Psychiatric: He has a normal mood and affect. His behavior is normal.   Nursing note and vitals reviewed.        MDM  Number of Diagnoses or Management Options  Contusion of right calf, initial encounter:      Amount and/or Complexity of Data Reviewed  Clinical lab tests: ordered and reviewed  Tests in the radiology section of CPT®: ordered and reviewed  Independent visualization of images, tracings, or specimens: yes    Risk of Complications, Morbidity, and/or Mortality  Presenting problems: moderate  Diagnostic procedures: moderate  Management options: moderate    Patient Progress  Patient progress: stable         Procedures

## 2019-03-13 NOTE — ED TRIAGE NOTES
Pt reports bilateral leg pain. Seen by pain management and had procedure done recently, but denies relief.

## 2019-03-13 NOTE — DISCHARGE INSTRUCTIONS
Patient Education        Bruises: Care Instructions  Your Care Instructions    Bruises occur when small blood vessels under the skin tear or rupture, most often from a twist, bump, or fall. Blood leaks into tissues under the skin and causes a black-and-blue spot that often turns colors, including purplish black, reddish blue, or yellowish green, as the bruise heals. Bruises hurt, but most are not serious and will go away on their own within 2 to 4 weeks. Sometimes, gravity causes them to spread down the body. A leg bruise usually will take longer to heal than a bruise on the face or arms. Follow-up care is a key part of your treatment and safety. Be sure to make and go to all appointments, and call your doctor if you are having problems. It's also a good idea to know your test results and keep a list of the medicines you take. How can you care for yourself at home? · Take pain medicines exactly as directed. ? If the doctor gave you a prescription medicine for pain, take it as prescribed. ? If you are not taking a prescription pain medicine, ask your doctor if you can take an over-the-counter medicine. · Put ice or a cold pack on the area for 10 to 20 minutes at a time. Put a thin cloth between the ice and your skin. · If you can, prop up the bruised area on pillows as much as possible for the next few days. Try to keep the bruise above the level of your heart. When should you call for help? Call your doctor now or seek immediate medical care if:    · You have signs of infection, such as:  ? Increased pain, swelling, warmth, or redness. ? Red streaks leading from the bruise. ? Pus draining from the bruise. ? A fever.     · You have a bruise on your leg and signs of a blood clot, such as:  ? Increasing redness and swelling along with warmth, tenderness, and pain in the bruised area. ? Pain in your calf, back of the knee, thigh, or groin. ?  Redness and swelling in your leg or groin.     · Your pain gets worse.    Watch closely for changes in your health, and be sure to contact your doctor if:    · You do not get better as expected. Where can you learn more? Go to http://elda-brea.info/. Enter (18) 138-512 in the search box to learn more about \"Bruises: Care Instructions. \"  Current as of: September 23, 2018  Content Version: 11.9  © 7794-4257 [employer]. Care instructions adapted under license by [employer] (which disclaims liability or warranty for this information). If you have questions about a medical condition or this instruction, always ask your healthcare professional. [address] any warranty or liability for your use of this information.

## 2019-03-13 NOTE — ED NOTES
I have reviewed discharge instructions with the patient. The patient verbalized understanding. Patient left ED via Discharge Method: ambulatory to Home with (self). Opportunity for questions and clarification provided. Patient given 0 scripts. To continue your aftercare when you leave the hospital, you may receive an automated call from our care team to check in on how you are doing. This is a free service and part of our promise to provide the best care and service to meet your aftercare needs.  If you have questions, or wish to unsubscribe from this service please call [phone]. Thank you for Choosing our [facility] Emergency Department.

## 2019-07-19 ENCOUNTER — HOSPITAL ENCOUNTER (OUTPATIENT)
Dept: LAB | Age: 64
Discharge: HOME | End: 2019-07-19
Attending: INTERNAL MEDICINE
Payer: MEDICARE

## 2019-07-19 DIAGNOSIS — E78.5 HYPERLIPIDEMIA, UNSPECIFIED HYPERLIPIDEMIA TYPE: Chronic | ICD-10-CM

## 2019-07-19 LAB
CHOLEST SERPL-MCNC: 304 MG/DL
HDLC SERPL-MCNC: 28 MG/DL (ref 40–60)
HDLC SERPL: 10.9 {RATIO}
LDLC SERPL CALC-MCNC: 224.4 MG/DL
LIPID PROFILE,FLP: ABNORMAL
TRIGL SERPL-MCNC: 258 MG/DL (ref 35–150)
VLDLC SERPL CALC-MCNC: 51.6 MG/DL (ref 6–23)

## 2019-07-19 PROCEDURE — 36415 COLL VENOUS BLD VENIPUNCTURE: CPT

## 2019-07-19 PROCEDURE — 80061 LIPID PANEL: CPT

## 2020-11-11 ENCOUNTER — APPOINTMENT (OUTPATIENT)
Dept: GENERAL RADIOLOGY | Age: 65
DRG: 247 | End: 2020-11-11
Attending: EMERGENCY MEDICINE
Payer: MEDICARE

## 2020-11-11 ENCOUNTER — HOSPITAL ENCOUNTER (INPATIENT)
Age: 65
LOS: 1 days | Discharge: HOME | DRG: 247 | End: 2020-11-13
Attending: EMERGENCY MEDICINE | Admitting: INTERNAL MEDICINE
Payer: MEDICARE

## 2020-11-11 DIAGNOSIS — J42 CHRONIC BRONCHITIS, UNSPECIFIED CHRONIC BRONCHITIS TYPE (HCC): ICD-10-CM

## 2020-11-11 DIAGNOSIS — I25.10 CORONARY ARTERY DISEASE INVOLVING NATIVE CORONARY ARTERY OF NATIVE HEART WITHOUT ANGINA PECTORIS: ICD-10-CM

## 2020-11-11 DIAGNOSIS — E78.5 HYPERLIPIDEMIA, UNSPECIFIED HYPERLIPIDEMIA TYPE: ICD-10-CM

## 2020-11-11 DIAGNOSIS — I20.0 UNSTABLE ANGINA (HCC): Primary | ICD-10-CM

## 2020-11-11 DIAGNOSIS — I10 ESSENTIAL HYPERTENSION: ICD-10-CM

## 2020-11-11 LAB
ALBUMIN SERPL-MCNC: 3.6 G/DL (ref 3.2–4.6)
ALBUMIN/GLOB SERPL: 1.1 {RATIO} (ref 1.2–3.5)
ALP SERPL-CCNC: 93 U/L (ref 50–136)
ALT SERPL-CCNC: 24 U/L (ref 12–65)
ANION GAP SERPL CALC-SCNC: 7 MMOL/L (ref 7–16)
APTT PPP: 29.5 SEC (ref 24.1–35.1)
AST SERPL-CCNC: 14 U/L (ref 15–37)
BASOPHILS # BLD: 0.1 K/UL (ref 0–0.2)
BASOPHILS NFR BLD: 1 % (ref 0–2)
BILIRUB SERPL-MCNC: 0.3 MG/DL (ref 0.2–1.1)
BUN SERPL-MCNC: 18 MG/DL (ref 8–23)
CALCIUM SERPL-MCNC: 8.6 MG/DL (ref 8.3–10.4)
CHLORIDE SERPL-SCNC: 111 MMOL/L (ref 98–107)
CO2 SERPL-SCNC: 25 MMOL/L (ref 21–32)
CREAT SERPL-MCNC: 1.35 MG/DL (ref 0.8–1.5)
DIFFERENTIAL METHOD BLD: ABNORMAL
EOSINOPHIL # BLD: 0.4 K/UL (ref 0–0.8)
EOSINOPHIL NFR BLD: 3 % (ref 0.5–7.8)
ERYTHROCYTE [DISTWIDTH] IN BLOOD BY AUTOMATED COUNT: 12.6 % (ref 11.9–14.6)
GLOBULIN SER CALC-MCNC: 3.4 G/DL (ref 2.3–3.5)
GLUCOSE SERPL-MCNC: 171 MG/DL (ref 65–100)
HCT VFR BLD AUTO: 46.1 % (ref 41.1–50.3)
HGB BLD-MCNC: 15.4 G/DL (ref 13.6–17.2)
IMM GRANULOCYTES # BLD AUTO: 0.1 K/UL (ref 0–0.5)
IMM GRANULOCYTES NFR BLD AUTO: 1 % (ref 0–5)
INR PPP: 0.9
LYMPHOCYTES # BLD: 3 K/UL (ref 0.5–4.6)
LYMPHOCYTES NFR BLD: 24 % (ref 13–44)
MAGNESIUM SERPL-MCNC: 2.2 MG/DL (ref 1.8–2.4)
MCH RBC QN AUTO: 29.6 PG (ref 26.1–32.9)
MCHC RBC AUTO-ENTMCNC: 33.4 G/DL (ref 31.4–35)
MCV RBC AUTO: 88.7 FL (ref 79.6–97.8)
MONOCYTES # BLD: 0.8 K/UL (ref 0.1–1.3)
MONOCYTES NFR BLD: 6 % (ref 4–12)
NEUTS SEG # BLD: 8.2 K/UL (ref 1.7–8.2)
NEUTS SEG NFR BLD: 66 % (ref 43–78)
NRBC # BLD: 0 K/UL (ref 0–0.2)
PLATELET # BLD AUTO: 214 K/UL (ref 150–450)
PMV BLD AUTO: 9.4 FL (ref 9.4–12.3)
POTASSIUM SERPL-SCNC: 3.7 MMOL/L (ref 3.5–5.1)
PROT SERPL-MCNC: 7 G/DL (ref 6.3–8.2)
PROTHROMBIN TIME: 12.5 SEC (ref 12.5–14.7)
RBC # BLD AUTO: 5.2 M/UL (ref 4.23–5.6)
SODIUM SERPL-SCNC: 143 MMOL/L (ref 136–145)
TROPONIN-HIGH SENSITIVITY: 299.7 PG/ML (ref 0–14)
WBC # BLD AUTO: 12.5 K/UL (ref 4.3–11.1)

## 2020-11-11 PROCEDURE — 93005 ELECTROCARDIOGRAM TRACING: CPT | Performed by: NURSE PRACTITIONER

## 2020-11-11 PROCEDURE — 85025 COMPLETE CBC W/AUTO DIFF WBC: CPT

## 2020-11-11 PROCEDURE — 99233 SBSQ HOSP IP/OBS HIGH 50: CPT | Performed by: INTERNAL MEDICINE

## 2020-11-11 PROCEDURE — 85610 PROTHROMBIN TIME: CPT

## 2020-11-11 PROCEDURE — 99285 EMERGENCY DEPT VISIT HI MDM: CPT

## 2020-11-11 PROCEDURE — 85730 THROMBOPLASTIN TIME PARTIAL: CPT

## 2020-11-11 PROCEDURE — 84484 ASSAY OF TROPONIN QUANT: CPT

## 2020-11-11 PROCEDURE — 74011250637 HC RX REV CODE- 250/637: Performed by: EMERGENCY MEDICINE

## 2020-11-11 PROCEDURE — 80053 COMPREHEN METABOLIC PANEL: CPT

## 2020-11-11 PROCEDURE — 93005 ELECTROCARDIOGRAM TRACING: CPT | Performed by: EMERGENCY MEDICINE

## 2020-11-11 PROCEDURE — 83735 ASSAY OF MAGNESIUM: CPT

## 2020-11-11 PROCEDURE — 71045 X-RAY EXAM CHEST 1 VIEW: CPT

## 2020-11-11 RX ORDER — NITROGLYCERIN 0.4 MG/1
0.4 TABLET SUBLINGUAL
Status: DISCONTINUED | OUTPATIENT
Start: 2020-11-11 | End: 2020-11-11

## 2020-11-11 RX ORDER — HEPARIN SODIUM 5000 [USP'U]/100ML
12-25 INJECTION, SOLUTION INTRAVENOUS
Status: DISCONTINUED | OUTPATIENT
Start: 2020-11-11 | End: 2020-11-13 | Stop reason: HOSPADM

## 2020-11-11 RX ORDER — HEPARIN SODIUM 5000 [USP'U]/ML
60 INJECTION, SOLUTION INTRAVENOUS; SUBCUTANEOUS ONCE
Status: COMPLETED | OUTPATIENT
Start: 2020-11-11 | End: 2020-11-12

## 2020-11-11 RX ADMIN — NITROGLYCERIN 1 INCH: 20 OINTMENT TOPICAL at 23:55

## 2020-11-12 PROBLEM — I21.4 NSTEMI (NON-ST ELEVATED MYOCARDIAL INFARCTION) (HCC): Status: ACTIVE | Noted: 2020-11-12

## 2020-11-12 LAB
ANION GAP SERPL CALC-SCNC: 6 MMOL/L (ref 7–16)
ATRIAL RATE: 69 BPM
ATRIAL RATE: 74 BPM
BUN SERPL-MCNC: 16 MG/DL (ref 8–23)
CALCIUM SERPL-MCNC: 8.9 MG/DL (ref 8.3–10.4)
CALCULATED P AXIS, ECG09: 59 DEGREES
CALCULATED P AXIS, ECG09: 70 DEGREES
CALCULATED R AXIS, ECG10: 52 DEGREES
CALCULATED R AXIS, ECG10: 56 DEGREES
CALCULATED T AXIS, ECG11: -64 DEGREES
CALCULATED T AXIS, ECG11: -94 DEGREES
CHLORIDE SERPL-SCNC: 112 MMOL/L (ref 98–107)
CO2 SERPL-SCNC: 25 MMOL/L (ref 21–32)
CREAT SERPL-MCNC: 1.09 MG/DL (ref 0.8–1.5)
DIAGNOSIS, 93000: NORMAL
DIAGNOSIS, 93000: NORMAL
GLUCOSE SERPL-MCNC: 128 MG/DL (ref 65–100)
P-R INTERVAL, ECG05: 160 MS
P-R INTERVAL, ECG05: 168 MS
POTASSIUM SERPL-SCNC: 3.6 MMOL/L (ref 3.5–5.1)
Q-T INTERVAL, ECG07: 376 MS
Q-T INTERVAL, ECG07: 402 MS
QRS DURATION, ECG06: 102 MS
QRS DURATION, ECG06: 98 MS
QTC CALCULATION (BEZET), ECG08: 417 MS
QTC CALCULATION (BEZET), ECG08: 430 MS
SODIUM SERPL-SCNC: 143 MMOL/L (ref 138–145)
TROPONIN-HIGH SENSITIVITY: 1370.6 PG/ML (ref 0–14)
UFH PPP CHRO-ACNC: 0.27 IU/ML (ref 0.3–0.7)
VENTRICULAR RATE, ECG03: 69 BPM
VENTRICULAR RATE, ECG03: 74 BPM

## 2020-11-12 PROCEDURE — 36415 COLL VENOUS BLD VENIPUNCTURE: CPT

## 2020-11-12 PROCEDURE — 74011250637 HC RX REV CODE- 250/637: Performed by: INTERNAL MEDICINE

## 2020-11-12 PROCEDURE — 92937 PRQ TRLUML REVSC CAB GRF 1: CPT

## 2020-11-12 PROCEDURE — 77030004559 HC CATH ANGI DX SUPT CARD -B

## 2020-11-12 PROCEDURE — 74011250636 HC RX REV CODE- 250/636: Performed by: NURSE PRACTITIONER

## 2020-11-12 PROCEDURE — C8929 TTE W OR WO FOL WCON,DOPPLER: HCPCS

## 2020-11-12 PROCEDURE — 74011250636 HC RX REV CODE- 250/636: Performed by: INTERNAL MEDICINE

## 2020-11-12 PROCEDURE — C1884 EMBOLIZATION PROTECT SYST: HCPCS

## 2020-11-12 PROCEDURE — 93459 L HRT ART/GRFT ANGIO: CPT

## 2020-11-12 PROCEDURE — B2131ZZ FLUOROSCOPY OF MULTIPLE CORONARY ARTERY BYPASS GRAFTS USING LOW OSMOLAR CONTRAST: ICD-10-PCS | Performed by: INTERNAL MEDICINE

## 2020-11-12 PROCEDURE — 74011000636 HC RX REV CODE- 636: Performed by: INTERNAL MEDICINE

## 2020-11-12 PROCEDURE — 77030016699 HC CATH ANGI DX INFN1 CARD -A

## 2020-11-12 PROCEDURE — 74011000250 HC RX REV CODE- 250: Performed by: INTERNAL MEDICINE

## 2020-11-12 PROCEDURE — B2151ZZ FLUOROSCOPY OF LEFT HEART USING LOW OSMOLAR CONTRAST: ICD-10-PCS | Performed by: INTERNAL MEDICINE

## 2020-11-12 PROCEDURE — C1769 GUIDE WIRE: HCPCS

## 2020-11-12 PROCEDURE — 65660000000 HC RM CCU STEPDOWN

## 2020-11-12 PROCEDURE — 74011250636 HC RX REV CODE- 250/636: Performed by: EMERGENCY MEDICINE

## 2020-11-12 PROCEDURE — 80048 BASIC METABOLIC PNL TOTAL CA: CPT

## 2020-11-12 PROCEDURE — 77030019569 HC BND COMPR RAD TERU -B

## 2020-11-12 PROCEDURE — B2111ZZ FLUOROSCOPY OF MULTIPLE CORONARY ARTERIES USING LOW OSMOLAR CONTRAST: ICD-10-PCS | Performed by: INTERNAL MEDICINE

## 2020-11-12 PROCEDURE — 84484 ASSAY OF TROPONIN QUANT: CPT

## 2020-11-12 PROCEDURE — 99152 MOD SED SAME PHYS/QHP 5/>YRS: CPT

## 2020-11-12 PROCEDURE — 96374 THER/PROPH/DIAG INJ IV PUSH: CPT

## 2020-11-12 PROCEDURE — 2709999900 HC NON-CHARGEABLE SUPPLY

## 2020-11-12 PROCEDURE — 74011250637 HC RX REV CODE- 250/637: Performed by: NURSE PRACTITIONER

## 2020-11-12 PROCEDURE — 027034Z DILATION OF CORONARY ARTERY, ONE ARTERY WITH DRUG-ELUTING INTRALUMINAL DEVICE, PERCUTANEOUS APPROACH: ICD-10-PCS | Performed by: INTERNAL MEDICINE

## 2020-11-12 PROCEDURE — 74011000258 HC RX REV CODE- 258: Performed by: INTERNAL MEDICINE

## 2020-11-12 PROCEDURE — 85520 HEPARIN ASSAY: CPT

## 2020-11-12 PROCEDURE — C1725 CATH, TRANSLUMIN NON-LASER: HCPCS

## 2020-11-12 PROCEDURE — 99153 MOD SED SAME PHYS/QHP EA: CPT

## 2020-11-12 PROCEDURE — C1887 CATHETER, GUIDING: HCPCS

## 2020-11-12 PROCEDURE — C1874 STENT, COATED/COV W/DEL SYS: HCPCS

## 2020-11-12 PROCEDURE — 4A023N7 MEASUREMENT OF CARDIAC SAMPLING AND PRESSURE, LEFT HEART, PERCUTANEOUS APPROACH: ICD-10-PCS | Performed by: INTERNAL MEDICINE

## 2020-11-12 RX ORDER — CARVEDILOL 6.25 MG/1
6.25 TABLET ORAL 2 TIMES DAILY WITH MEALS
Status: DISCONTINUED | OUTPATIENT
Start: 2020-11-12 | End: 2020-11-13 | Stop reason: HOSPADM

## 2020-11-12 RX ORDER — NITROGLYCERIN 0.4 MG/1
0.4 TABLET SUBLINGUAL
Status: DISCONTINUED | OUTPATIENT
Start: 2020-11-12 | End: 2020-11-12 | Stop reason: SDUPTHER

## 2020-11-12 RX ORDER — GUAIFENESIN 100 MG/5ML
324 LIQUID (ML) ORAL ONCE
Status: COMPLETED | OUTPATIENT
Start: 2020-11-12 | End: 2020-11-12

## 2020-11-12 RX ORDER — LISINOPRIL 20 MG/1
20 TABLET ORAL DAILY
Status: DISCONTINUED | OUTPATIENT
Start: 2020-11-12 | End: 2020-11-13 | Stop reason: HOSPADM

## 2020-11-12 RX ORDER — NITROGLYCERIN 0.4 MG/1
0.4 TABLET SUBLINGUAL AS NEEDED
Status: DISCONTINUED | OUTPATIENT
Start: 2020-11-12 | End: 2020-11-13 | Stop reason: HOSPADM

## 2020-11-12 RX ORDER — MIDAZOLAM HYDROCHLORIDE 1 MG/ML
.5-2 INJECTION, SOLUTION INTRAMUSCULAR; INTRAVENOUS
Status: DISCONTINUED | OUTPATIENT
Start: 2020-11-12 | End: 2020-11-13 | Stop reason: HOSPADM

## 2020-11-12 RX ORDER — MORPHINE SULFATE 2 MG/ML
2 INJECTION, SOLUTION INTRAMUSCULAR; INTRAVENOUS
Status: DISCONTINUED | OUTPATIENT
Start: 2020-11-12 | End: 2020-11-13 | Stop reason: HOSPADM

## 2020-11-12 RX ORDER — FENOFIBRATE 160 MG/1
160 TABLET ORAL DAILY
Status: DISCONTINUED | OUTPATIENT
Start: 2020-11-12 | End: 2020-11-13 | Stop reason: HOSPADM

## 2020-11-12 RX ORDER — HEPARIN SODIUM 200 [USP'U]/100ML
2 INJECTION, SOLUTION INTRAVENOUS CONTINUOUS
Status: DISPENSED | OUTPATIENT
Start: 2020-11-12 | End: 2020-11-12

## 2020-11-12 RX ORDER — SODIUM CHLORIDE 0.9 % (FLUSH) 0.9 %
5-40 SYRINGE (ML) INJECTION EVERY 8 HOURS
Status: DISCONTINUED | OUTPATIENT
Start: 2020-11-12 | End: 2020-11-13 | Stop reason: HOSPADM

## 2020-11-12 RX ORDER — CLOPIDOGREL BISULFATE 75 MG/1
300 TABLET ORAL ONCE
Status: COMPLETED | OUTPATIENT
Start: 2020-11-12 | End: 2020-11-12

## 2020-11-12 RX ORDER — HYDROCODONE BITARTRATE AND ACETAMINOPHEN 10; 325 MG/1; MG/1
1 TABLET ORAL
Status: DISCONTINUED | OUTPATIENT
Start: 2020-11-12 | End: 2020-11-13 | Stop reason: HOSPADM

## 2020-11-12 RX ORDER — LIDOCAINE HYDROCHLORIDE 10 MG/ML
2-20 INJECTION, SOLUTION EPIDURAL; INFILTRATION; INTRACAUDAL; PERINEURAL
Status: DISCONTINUED | OUTPATIENT
Start: 2020-11-12 | End: 2020-11-13 | Stop reason: HOSPADM

## 2020-11-12 RX ORDER — ATORVASTATIN CALCIUM 80 MG/1
80 TABLET, FILM COATED ORAL DAILY
Status: DISCONTINUED | OUTPATIENT
Start: 2020-11-12 | End: 2020-11-12

## 2020-11-12 RX ORDER — SODIUM CHLORIDE 9 MG/ML
75 INJECTION, SOLUTION INTRAVENOUS CONTINUOUS
Status: DISCONTINUED | OUTPATIENT
Start: 2020-11-12 | End: 2020-11-13 | Stop reason: HOSPADM

## 2020-11-12 RX ORDER — HEPARIN SODIUM 5000 [USP'U]/ML
20 INJECTION, SOLUTION INTRAVENOUS; SUBCUTANEOUS ONCE
Status: COMPLETED | OUTPATIENT
Start: 2020-11-12 | End: 2020-11-12

## 2020-11-12 RX ORDER — ASPIRIN 81 MG/1
81 TABLET ORAL DAILY
Status: DISCONTINUED | OUTPATIENT
Start: 2020-11-13 | End: 2020-11-13 | Stop reason: HOSPADM

## 2020-11-12 RX ORDER — SODIUM CHLORIDE 0.9 % (FLUSH) 0.9 %
5-40 SYRINGE (ML) INJECTION AS NEEDED
Status: DISCONTINUED | OUTPATIENT
Start: 2020-11-12 | End: 2020-11-13 | Stop reason: HOSPADM

## 2020-11-12 RX ORDER — ATORVASTATIN CALCIUM 80 MG/1
80 TABLET, FILM COATED ORAL
Status: DISCONTINUED | OUTPATIENT
Start: 2020-11-13 | End: 2020-11-12

## 2020-11-12 RX ORDER — PANTOPRAZOLE SODIUM 40 MG/1
40 TABLET, DELAYED RELEASE ORAL
Status: DISCONTINUED | OUTPATIENT
Start: 2020-11-12 | End: 2020-11-13 | Stop reason: HOSPADM

## 2020-11-12 RX ORDER — DIAZEPAM 5 MG/1
5 TABLET ORAL
Status: DISCONTINUED | OUTPATIENT
Start: 2020-11-12 | End: 2020-11-13 | Stop reason: HOSPADM

## 2020-11-12 RX ORDER — FENTANYL CITRATE 50 UG/ML
25-100 INJECTION, SOLUTION INTRAMUSCULAR; INTRAVENOUS
Status: DISCONTINUED | OUTPATIENT
Start: 2020-11-12 | End: 2020-11-13 | Stop reason: HOSPADM

## 2020-11-12 RX ORDER — ATORVASTATIN CALCIUM 80 MG/1
80 TABLET, FILM COATED ORAL
Status: DISCONTINUED | OUTPATIENT
Start: 2020-11-12 | End: 2020-11-13 | Stop reason: HOSPADM

## 2020-11-12 RX ORDER — ACETAMINOPHEN 500 MG
500 TABLET ORAL
Status: DISCONTINUED | OUTPATIENT
Start: 2020-11-12 | End: 2020-11-13 | Stop reason: HOSPADM

## 2020-11-12 RX ORDER — ZOLPIDEM TARTRATE 5 MG/1
5 TABLET ORAL
Status: DISCONTINUED | OUTPATIENT
Start: 2020-11-12 | End: 2020-11-13 | Stop reason: HOSPADM

## 2020-11-12 RX ORDER — GUAIFENESIN 100 MG/5ML
81 LIQUID (ML) ORAL DAILY
Status: DISCONTINUED | OUTPATIENT
Start: 2020-11-12 | End: 2020-11-12 | Stop reason: SDUPTHER

## 2020-11-12 RX ORDER — HEPARIN SODIUM 10000 [USP'U]/ML
1000-10000 INJECTION, SOLUTION INTRAVENOUS; SUBCUTANEOUS
Status: DISCONTINUED | OUTPATIENT
Start: 2020-11-12 | End: 2020-11-13 | Stop reason: HOSPADM

## 2020-11-12 RX ORDER — CLOPIDOGREL BISULFATE 75 MG/1
75 TABLET ORAL DAILY
Status: DISCONTINUED | OUTPATIENT
Start: 2020-11-12 | End: 2020-11-13 | Stop reason: HOSPADM

## 2020-11-12 RX ADMIN — CARVEDILOL 6.25 MG: 6.25 TABLET, FILM COATED ORAL at 08:22

## 2020-11-12 RX ADMIN — MIDAZOLAM 1 MG: 1 INJECTION INTRAMUSCULAR; INTRAVENOUS at 12:46

## 2020-11-12 RX ADMIN — FENOFIBRATE 160 MG: 160 TABLET ORAL at 08:22

## 2020-11-12 RX ADMIN — ACETAMINOPHEN 500 MG: 500 TABLET, FILM COATED ORAL at 09:30

## 2020-11-12 RX ADMIN — FENTANYL CITRATE 25 MCG: 50 INJECTION, SOLUTION INTRAMUSCULAR; INTRAVENOUS at 13:31

## 2020-11-12 RX ADMIN — SODIUM CHLORIDE 75 ML/HR: 900 INJECTION, SOLUTION INTRAVENOUS at 02:33

## 2020-11-12 RX ADMIN — HEPARIN SODIUM 12 UNITS/KG/HR: 5000 INJECTION, SOLUTION INTRAVENOUS at 00:06

## 2020-11-12 RX ADMIN — HYDROCODONE BITARTRATE AND ACETAMINOPHEN 1 TABLET: 10; 325 TABLET ORAL at 17:04

## 2020-11-12 RX ADMIN — PANTOPRAZOLE SODIUM 40 MG: 40 TABLET, DELAYED RELEASE ORAL at 08:22

## 2020-11-12 RX ADMIN — HEPARIN SODIUM 6400 UNITS: 5000 INJECTION INTRAVENOUS; SUBCUTANEOUS at 00:05

## 2020-11-12 RX ADMIN — LISINOPRIL 20 MG: 20 TABLET ORAL at 08:23

## 2020-11-12 RX ADMIN — MIDAZOLAM 1 MG: 1 INJECTION INTRAMUSCULAR; INTRAVENOUS at 12:54

## 2020-11-12 RX ADMIN — FENTANYL CITRATE 25 MCG: 50 INJECTION, SOLUTION INTRAMUSCULAR; INTRAVENOUS at 12:46

## 2020-11-12 RX ADMIN — CLOPIDOGREL BISULFATE 300 MG: 75 TABLET ORAL at 13:47

## 2020-11-12 RX ADMIN — VERAPAMIL HYDROCHLORIDE 2 ML: 2.5 INJECTION, SOLUTION INTRAVENOUS at 12:58

## 2020-11-12 RX ADMIN — Medication 5 ML: at 21:57

## 2020-11-12 RX ADMIN — FENTANYL CITRATE 25 MCG: 50 INJECTION, SOLUTION INTRAMUSCULAR; INTRAVENOUS at 12:54

## 2020-11-12 RX ADMIN — LIDOCAINE HYDROCHLORIDE 4 ML: 10 INJECTION, SOLUTION EPIDURAL; INFILTRATION; INTRACAUDAL; PERINEURAL at 12:57

## 2020-11-12 RX ADMIN — HEPARIN SODIUM 2150 UNITS: 5000 INJECTION INTRAVENOUS; SUBCUTANEOUS at 09:29

## 2020-11-12 RX ADMIN — MORPHINE SULFATE 2 MG: 2 INJECTION, SOLUTION INTRAMUSCULAR; INTRAVENOUS at 02:10

## 2020-11-12 RX ADMIN — Medication 10 ML: at 05:58

## 2020-11-12 RX ADMIN — FENTANYL CITRATE 25 MCG: 50 INJECTION, SOLUTION INTRAMUSCULAR; INTRAVENOUS at 13:01

## 2020-11-12 RX ADMIN — Medication 10 ML: at 14:00

## 2020-11-12 RX ADMIN — CARVEDILOL 6.25 MG: 6.25 TABLET, FILM COATED ORAL at 17:00

## 2020-11-12 RX ADMIN — MIDAZOLAM 1 MG: 1 INJECTION INTRAMUSCULAR; INTRAVENOUS at 13:01

## 2020-11-12 RX ADMIN — PERFLUTREN 1 ML: 6.52 INJECTION, SUSPENSION INTRAVENOUS at 09:00

## 2020-11-12 RX ADMIN — CLOPIDOGREL BISULFATE 75 MG: 75 TABLET ORAL at 08:22

## 2020-11-12 RX ADMIN — HEPARIN SODIUM 2 ML/HR: 5000 INJECTION, SOLUTION INTRAVENOUS; SUBCUTANEOUS at 12:41

## 2020-11-12 RX ADMIN — ACETAMINOPHEN 500 MG: 500 TABLET, FILM COATED ORAL at 03:54

## 2020-11-12 RX ADMIN — ASPIRIN 324 MG: 81 TABLET, CHEWABLE ORAL at 08:22

## 2020-11-12 RX ADMIN — FENTANYL CITRATE 25 MCG: 50 INJECTION, SOLUTION INTRAMUSCULAR; INTRAVENOUS at 13:20

## 2020-11-12 RX ADMIN — IOPAMIDOL 244 ML: 755 INJECTION, SOLUTION INTRAVENOUS at 13:44

## 2020-11-12 RX ADMIN — ATORVASTATIN CALCIUM 80 MG: 80 TABLET, FILM COATED ORAL at 21:57

## 2020-11-12 RX ADMIN — BIVALIRUDIN 1.75 MG/KG/HR: 250 INJECTION, POWDER, LYOPHILIZED, FOR SOLUTION INTRAVENOUS at 13:26

## 2020-11-12 NOTE — PROCEDURES
Brief Cardiac Procedure Note    Patient: Charleen Wakefield MRN: [id number]  SSN: [id number]    YOB: 1955  Age: 60 y.o. Sex: male      Date of Procedure: 11/12/2020     Pre-procedure Diagnosis: NSTEMI    Post-procedure Diagnosis: Coronary Artery Disease    Reason for Procedure: New Onset Angina < or = 2 Months    Procedure: Left Heart Catheterization with Percutaneous Coronary Intervention    Brief Description of Procedure: lhc, svg times 2, lima. pci svg to om    Performed By: Lyla Ramos MD     Assistants: none    Anesthesia: Moderate Sedation    Estimated Blood Loss: Less than 10 mL      Specimens: None    Implants: None    Findings: gabriel svg om, 3/4 patent grafts, inf ak, LL33    Complications: None    Recommendations: Continue medical therapy.     Signed By: Lyla Ramos MD     November 12, 2020

## 2020-11-12 NOTE — PROGRESS NOTES
Care Management Interventions  Transition of Care Consult (CM Consult): Discharge Planning  Discharge Durable Medical Equipment: No  Physical Therapy Consult: No  Occupational Therapy Consult: No  Discharge Location  Discharge Placement: Home    Chart screened by  for discharge planning. No needs identified at this time. Please consult  if any new issues arise. CM noted pt received stent to SVG to OM. CM to continue to follow for DCP.

## 2020-11-12 NOTE — PROCEDURES
[facility]  CARDIAC CATH    Name:  Nhi Olson  MR#:  [id number]  :  1955  ACCOUNT #:  [de-identified]  DATE OF SERVICE:  2020    PROCEDURES PERFORMED:  Left heart catheterization and vein graft angiography x2 and LIMA angiography x1 was performed from the left radial artery with a 5-Lebanese IM catheter, multipurpose catheter, JL-3.5 catheter, and angled pigtail. Angioplasty and stenting of the vein graft to the obtuse marginal branch was performed with a 6-Lebanese AL-1 guide, a Prowater wire, Angiomax for anticoagulation, a 4.0 Spiderwire and Plavix was reloaded at the end of the procedure. A TR band was placed with good hemostasis at the end of the procedure. PREOPERATIVE DIAGNOSIS:  Non-ST segment elevation myocardial infarction. POSTOPERATIVE DIAGNOSIS:  Coronary artery disease. SURGEON:  Clementine Blackman MD    ASSISTANT:  None. ESTIMATED BLOOD LOSS:  5 mL. SPECIMENS REMOVED:  None. COMPLICATIONS:  None. IMPLANTS:  A 3.5 x 30 Moro drug-eluting stent. ANESTHESIA:  Provided by Javed Nolan RN beginning at 12:45, concluding at 13:45. A total of 3 mg of Versed and 125 mcg of fentanyl was given. Vital signs and saturations were stable throughout. FINDINGS:  Left ventricle shows severely reduced LV systolic function, ejection fraction of 35%. Left ventricular end-diastolic pressure is 18 mmHg with an opening aortic pressure of 140/72. No gradient across the aortic valve. There is inferior akinesis. The left main coronary artery is highly diseased up to 90%. The LAD is flush occluded and there is ramus intermedius diffusely diseased up to 90%. This is a small vessel, best managed medically. The circumflex is occluded. The right coronary artery is a dominant vessel in normal anatomic position, diffusely diseased proximally up to 30% occluded in the midportion. LIMA to LAD is widely patent.   There is also touchdown on a small diagonal branch. There are collaterals to the circumflex system as well as to the RCA. The distal anastomoses are widely patent. The LAD has 30% midvessel irregularity and 90% apical irregularity in a less than 2-mm portion of the vessel. The vein graft to the RCA is 100% proximally occluded. The vein graft to the circumflex is diffusely diseased proximally up to 30%. There is 95% midvessel narrowing. The runoff vessel is small, diffusely diseased in the superior limb up to 80%. This vessel is too small for percutaneous intervention. After Angiomax was given, a Prowater wire was placed in the distal obtuse marginal branch to the vein graft. Attempts at placing a Spiderwire were unsuccessful. A 2.0 x 12 Trek balloon was used to predilate the tract and then the [address] was placed distally. A Spider basket was deployed and then a 3.5 x 30 Marcio drug-eluting stent was deployed at 14 atmospheres. There was 0% residual and DANILO-3 flow. The basket was retrieved with no disruption of the vessel. CONCLUSIONS:  1. Successful angioplasty and stenting of the vein graft to the obtuse marginal branch. 2.  Three of four patent bypass grafts. 3.  Severely reduced LV systolic function with normal filling pressures. 4.  TR band for hemostasis.       Merl Sandifer, MD      [id number]  D:  11/12/2020 14:05  T:  11/12/2020 18:35  JOB #:  [id number]

## 2020-11-12 NOTE — ED TRIAGE NOTES
Arrives with face mask in place. Arrives from home via [facility] with reports substernal non-radiating chest pain onset couple weeks pta. States progressive worsening over last couple days, worse tonight. States standing up when noticed shock to left side chest. Believes defibrillator to have gone off. Denies being knocked down or loss of consciousness. Denies shortness of breath, cough, n/v/d, fever/chills. Followed by dr Terese Thomas, [facility] cards.  with EMS. Attempted asa 81mg x4 prior to EMS arrival, valium couple hours prior to that.

## 2020-11-12 NOTE — PROGRESS NOTES
Radial compression band removed at 1625 after slowly reducing air from 12 cc to zero as per hospital protocol. No bleeding or hematoma noted. 2 x 2 gauze with tegaderm placed over puncture site. The affected extremity is warm and dry to the touch. Frequent vital signs printed and placed on bedside chart. Patient instructed to call if any bleeding noted on gauze. Patient verbalized understanding the nursing instructions.

## 2020-11-12 NOTE — ROUTINE PROCESS
TRANSFER - IN REPORT: 
 
Verbal report received from [address] on [address] being received from ER for routine progression of care. Report consisted of patients Situation, Background, Assessment and Recommendations(SBAR). Information from the following report(s) SBAR, Kardex, [facility] and Recent Results was reviewed. Opportunity for questions and clarification was provided. Assessment completed upon patients arrival to unit and care assumed. Patient received to room 330. Patient connected to monitor and assessment completed. Plan of care reviewed. Patient oriented to room and call light. Patient aware to use call light to communicate any chest pain or needs. Admission skin assessment completed with second RN and reveals the following: Sacrum is intact and blanchable. Heels are intact as well. There is a scar on chest from previous CABG as well as bilateral scars on lower legs.

## 2020-11-12 NOTE — ED PROVIDER NOTES
Patient is a 72-year-old male with a history of heart disease who comes to the emergency department today via EMS complaining of chest pain. Patient states for the past several weeks he has been getting near daily episodes of substernal chest pain which he reports as pressure and heaviness. Often worse with exertion. No shortness of breath. No diaphoresis. Tonight the pain was more severe. Also felt a sharp pain across the left chest and the left shoulder and thinks his defibrillator may have fired. States he has been talking to his cardiologist but was too busy to schedule a stress test.  Pain has since resolved and he feels okay now. The history is provided by the patient. Chest Pain    The current episode started more than 1 week ago. The problem has been gradually worsening. The problem occurs daily. The pain is associated with normal activity. The pain is present in the substernal region. The pain is moderate. The quality of the pain is described as pressure-like and heavy. The pain does not radiate. The symptoms are aggravated by exertion. Associated symptoms include exertional chest pressure. Pertinent negatives include no abdominal pain, no back pain, no claudication, no cough, no diaphoresis, no fever, no irregular heartbeat, no nausea, no palpitations, no shortness of breath, no sputum production, no vomiting and no weakness. He has tried aspirin for the symptoms. Risk factors include smoking/tobacco exposure, hypertension, male gender and cardiac disease. His past medical history is significant for HTN. Procedural history includes cardiac catheterization, cardiac stents, pacemaker and CABG.        Past Medical History:   Diagnosis Date    AAA (abdominal aortic aneurysm) ([address] 9/23/2011 9/29/11 AORTIC ABDOMINAL ANUERYSM REPAIR ENDOVASCULAR ([facility]AR)-  Jayesh Underwood 4.6cm on US 9/23/11     Acute myocardial infarction [facility]) February, 2010    MI 2/10 - Quad bypass 2/25/10 ,4/2016    Anticoagulation monitoring, INR range 2-3 10/5/2011    Anxiety 11/3/2011    Arthritis 11/3/2011    CAD (coronary artery disease)     CAD (coronary artery disease), native coronary artery 2/24/2010    Cardiomyopathy ([address] 2/24/2010    Chest discomfort 06/16/15    Tightness, Pressure. Jan 2014:  stress MPI with Lexiscan - normal perfusion, LVEF 52%    Chronic systolic heart failure ([address] 2/24/2010    Claudication ([address] 06/16/15    Claudication, symptom, pain relieved by rest    Congestive heart failure, unspecified     COPD (chronic obstructive pulmonary disease) ([address] 11/3/2011    Extremity atherosclerosis with resting pain ([address] 9/23/2011 9/28/11 ; [place] [facility]- Dr. Milan Mckinney GERD (gastroesophageal reflux disease) 11/3/2011    History of AAA (abdominal aortic aneurysm) repair ? ??    including right femoral artery    Hypercholesterolemia     Hyperlipidemia 11/3/2011    Hypertension 11/3/2011    Popliteal artery aneurysm, bilateral History 9/23/2011    L Pop A aneurysm stent graft 9/8/11- Dr. Radha Cabezas Pop A aneurysm repair      Psychiatric disorder     anxiety    PUD (peptic ulcer disease) ? ??    hx bleeding gastric ulcers     PVD (peripheral vascular disease) ([address] 11/3/2011    S/P AAA (abdominal aortic aneurysm) repair 12/11/2013    [facility]    S/P CABG (coronary artery bypass graft) 4/26/2016    Tobacco abuse 4/23/2014    Weight loss, unintentional 9/23/2011       Past Surgical History:   Procedure Laterality Date    BYPASS GRAFT OTHR,FEM-POP  03/18/2011    right leg    BYPASS GRAFT OTHR,FEM-TIBIAL  2012    LLE bypass pop aneurysm    CABG, ARTERY-VEIN, FIVE      2010    CABG, ARTERY-VEIN, FOUR  2010    HX AAA REPAIR      10/2011    HX CHOLECYSTECTOMY  2012    HX FEMORAL BYPASS      Left bypass stent graft    LAP,CHOLECYSTECTOMY      09/06/11         Family History:   Problem Relation Age of Onset    Heart Disease Mother    Carlie Juarez Hypertension Mother     Cancer Maternal Grandmother     Heart Disease Maternal Grandfather     Hypertension Maternal Uncle     Diabetes Maternal Uncle     Diabetes Brother     Cancer Paternal Uncle        Social History     Socioeconomic History    Marital status:      Spouse name: Not on file    Number of children: Not on file    Years of education: Not on file    Highest education level: Not on file   Occupational History    Not on file   Social Needs    Financial resource strain: Not on file    Food insecurity     Worry: Not on file     Inability: Not on file   [employer] needs     Medical: Not on file     Non-medical: Not on file   Tobacco Use    Smoking status: Current Every Day Smoker     Packs/day: 0.50     Years: 40.00     Pack years: 20.00     Types: Cigarettes    Smokeless tobacco: Never Used   Substance and Sexual Activity    Alcohol use: No    Drug use: Yes     Types: Marijuana, Prescription     Comment: very rare    Sexual activity: Not on file   Lifestyle    Physical activity     Days per week: Not on file     Minutes per session: Not on file    Stress: Not on file   Relationships    Social connections     Talks on phone: Not on file     Gets together: Not on file     Attends Bahai service: Not on file     Active member of club or organization: Not on file     Attends meetings of clubs or organizations: Not on file     Relationship status: Not on file    Intimate partner violence     Fear of current or ex partner: Not on file     Emotionally abused: Not on file     Physically abused: Not on file     Forced sexual activity: Not on file   Other Topics Concern    Not on file   Social History Narrative    Not on file         ALLERGIES: Codeine and Venom-honey bee    Review of Systems   Constitutional: Negative for chills, diaphoresis, fatigue and fever. HENT: Negative for congestion, rhinorrhea and sore throat.     Eyes: Negative for pain, discharge and visual disturbance. Respiratory: Negative for cough, sputum production and shortness of breath. Cardiovascular: Positive for chest pain. Negative for palpitations and claudication. Gastrointestinal: Negative for abdominal pain, diarrhea, nausea and vomiting. Endocrine: Negative for polydipsia and polyuria. Genitourinary: Negative for dysuria, frequency and urgency. Musculoskeletal: Negative for back pain and neck pain. Skin: Negative for rash. Neurological: Negative for seizures, syncope and weakness. Hematological: Negative. Vitals:    11/11/20 2255   BP: (!) 151/82   Pulse: 76   Resp: 18   Temp: 97.9 °F (36.6 °C)   SpO2: 95%   Weight: 107 kg (236 lb)   Height: 6' (1.829 m)            Physical Exam  Vitals signs and nursing note reviewed. Constitutional:       Appearance: Normal appearance. He is well-developed. He is ill-appearing. HENT:      Head: Normocephalic and atraumatic. Nose: Nose normal.   Eyes:      Extraocular Movements: Extraocular movements intact. Conjunctiva/sclera: Conjunctivae normal.      Pupils: Pupils are equal, round, and reactive to light. Neck:      Musculoskeletal: Normal range of motion and neck supple. Cardiovascular:      Rate and Rhythm: Normal rate and regular rhythm. Heart sounds: Normal heart sounds. Pulmonary:      Effort: Pulmonary effort is normal.      Breath sounds: Normal breath sounds. Chest:      Chest wall: No deformity, tenderness or crepitus. Abdominal:      Palpations: Abdomen is soft. Tenderness: There is no abdominal tenderness. There is no guarding or rebound. Musculoskeletal: Normal range of motion. General: No tenderness. Right lower leg: No edema. Left lower leg: No edema. Lymphadenopathy:      Cervical: No cervical adenopathy. Skin:     General: Skin is warm and dry. Findings: No rash. Neurological:      General: No focal deficit present.       Mental Status: He is alert and oriented to person, place, and time. GCS: GCS eye subscore is 4. GCS verbal subscore is 5. GCS motor subscore is 6. Cranial Nerves: No cranial nerve deficit. Sensory: No sensory deficit. Motor: Motor function is intact. MDM  Number of Diagnoses or Management Options  Diagnosis management comments: I wore appropriate PPE throughout this patient's ED visit. Michael Pennington MD, 11:20 PM    EKG reviewed by me shows normal sinus rhythm rate is 74 Q waves inferiorly. No acute ischemia. Patient is already taken his aspirin today. No pain currently as it has resolved. We will check cardiac enzymes. We will also interrogate his [employer] defibrillator as patient feels it may have fired. 11:50 PM  Chest x-ray is clear  CBC and chemistries unremarkable  High-sensitivity troponin elevated at 299    Patient told the nurse that he was having pain again. Repeat EKG was done and is unchanged there is no acute ST segment elevation    I discussed the case with Dr. Jorge Weiss who  is on-call for cardiology. They will evaluate the patient here in the emergency department. He request nitroglycerin paste and heparin. Voice dictation software was used during the making of this note. This software is not perfect and grammatical and other typographical errors may be present. This note has been proofread, but may still contain errors.   Michael Pennington MD; 11/11/2020 @11:52 PM   ===================================================================         Amount and/or Complexity of Data Reviewed  Clinical lab tests: ordered and reviewed  Tests in the radiology section of CPT®: ordered and reviewed  Tests in the medicine section of CPT®: ordered and reviewed  Review and summarize past medical records: yes  Discuss the patient with other providers: yes  Independent visualization of images, tracings, or specimens: yes    Risk of Complications, Morbidity, and/or Mortality  Presenting problems: moderate  Diagnostic procedures: moderate  Management options: low    Patient Progress  Patient progress: stable         Procedures

## 2020-11-12 NOTE — PROGRESS NOTES
TRANSFER - OUT REPORT:    Verbal report given to Alanna Page RN(name) on Jay Chu  being transferred to [facility](unit) for routine progression of care       Report consisted of patients Situation, Background, Assessment and   Recommendations(SBAR). Information from the following report(s) Procedure Summary, MAR and Cardiac Rhythm SR was reviewed with the receiving nurse. Lines:   Peripheral IV 11/11/20 Left Antecubital (Active)   Site Assessment Clean, dry, & intact 11/12/20 1215   Phlebitis Assessment 0 11/12/20 1215   Infiltration Assessment 0 11/12/20 1215   Dressing Status Clean, dry, & intact 11/12/20 1215   Dressing Type Tape;Transparent 11/12/20 1215   Hub Color/Line Status Patent; Flushed 11/12/20 1215   Alcohol Cap Used No 11/12/20 1215       Peripheral IV 11/12/20 Right Forearm (Active)   Site Assessment Clean, dry, & intact 11/12/20 1215   Phlebitis Assessment 0 11/12/20 1215   Infiltration Assessment 0 11/12/20 1215   Dressing Status Clean, dry, & intact 11/12/20 1215   Dressing Type Tape;Transparent 11/12/20 1215   Hub Color/Line Status Patent; Flushed 11/12/20 1215   Alcohol Cap Used No 11/12/20 1215        Opportunity for questions and clarification was provided.       Patient transported with:   Monitor  Registered Nurse     [facility] Dr Etelvina Carrillo - 12 ml air in band @ 1345  POBA/Stent x1 VG - OM  angiomax off @ 1350  Versed 3 mg IV  Fent 125 mcg IV  Plavix 300 mg PO

## 2020-11-12 NOTE — H&P
[facility] Cardiology History & Physical      Date of  Admission: 11/11/2020 10:45 PM     Primary Care Physician: Dr. Harmeet Garvin  Primary Cardiologist:  Dr. Turner Arthur  Admitting Physician:  Dr. Turner Arthur    CC:  Chest pain     HPI:  Alcides Garza is a 60 y.o. male with PMH of CAD with CABG 2013 ([address] [facility] 2016 LIMA-LAD/Diag patent, SVG-OM patent, and PCI to SVG to PDA), chronic systolic HF (EF 14-27% on echo 2016), [facility] ICD, anxiety, AAA repair in 2013, HTN, HLD current everyday smoker (40 pack year history) and GERD, who presented to the ED with c/o chest pain. Patient notes pain has been intermittent for a few weeks but has become more frequent. The CP is SS with radiation to left arm and back, is exacerbated with exertion. Denies dyspnea, syncope, N/V, fever or chills. The CP was a 10/10 at home tonight and EMS was summoned. He took  mg at home. On arrival to the ED, EKG showed SR without acute ST elevation. Labs showed WBC 12.5, H&H 15/46, plt 214, , K 3.7, creatinine 1.35, BUN 18, and hs trop 299.7. He has been treated with heparin bolus and drip and nitro paste to the chest with improved chest pain. Past Medical History:   Diagnosis Date    AAA (abdominal aortic aneurysm) ([facility] 9/23/2011 9/29/11 AORTIC ABDOMINAL ANUERYSM REPAIR ENDOVASCULAR ([facility]AR)-  / Yasmine 4.6cm on US 9/23/11     Acute myocardial infarction [facility]) February, 2010    MI 2/10 - Quad bypass 2/25/10 ,4/2016    Anticoagulation monitoring, INR range 2-3 10/5/2011    Anxiety 11/3/2011    Arthritis 11/3/2011    CAD (coronary artery disease)     CAD (coronary artery disease), native coronary artery 2/24/2010    Cardiomyopathy ([address] 2/24/2010    Chest discomfort 06/16/15    Tightness, Pressure.   Jan 2014:  stress MPI with Lexiscan - normal perfusion, LVEF 52%    Chronic systolic heart failure ([address] 2/24/2010    Claudication ([facility] 06/16/15    Claudication, symptom, pain relieved by rest    Congestive heart failure, unspecified     COPD (chronic obstructive pulmonary disease) ([facility] 11/3/2011    Extremity atherosclerosis with resting pain ([facility] 9/23/2011 9/28/11 ; [place] Rich- Dr. Haley Whittington GERD (gastroesophageal reflux disease) 11/3/2011    History of AAA (abdominal aortic aneurysm) repair ? ??    including right femoral artery    Hypercholesterolemia     Hyperlipidemia 11/3/2011    Hypertension 11/3/2011    Popliteal artery aneurysm, bilateral History 9/23/2011    L Pop A aneurysm stent graft 9/8/11- Dr. Marleni Flaherty Pop A aneurysm repair      Psychiatric disorder     anxiety    PUD (peptic ulcer disease) ? ??    hx bleeding gastric ulcers     PVD (peripheral vascular disease) ([facility] 11/3/2011    S/P AAA (abdominal aortic aneurysm) repair 12/11/2013    [facility]    S/P CABG (coronary artery bypass graft) 4/26/2016    Tobacco abuse 4/23/2014    Weight loss, unintentional 9/23/2011      Past Surgical History:   Procedure Laterality Date    BYPASS GRAFT OTHR,FEM-POP  03/18/2011    right leg    BYPASS GRAFT OTHR,FEM-TIBIAL  2012    LLE bypass pop aneurysm    CABG, ARTERY-VEIN, FIVE      2010    CABG, ARTERY-VEIN, FOUR  2010    HX AAA REPAIR      10/2011    HX CHOLECYSTECTOMY  2012    HX FEMORAL BYPASS      Left bypass stent graft    LAP,CHOLECYSTECTOMY      09/06/11       Allergies   Allergen Reactions    Codeine Rash    Venom-Honey Bee Angioedema      Social History     Socioeconomic History    Marital status:      Spouse name: Not on file    Number of children: Not on file    Years of education: Not on file    Highest education level: Not on file   Occupational History    Not on file   Social Needs    Financial resource strain: Not on file    Food insecurity     Worry: Not on file     Inability: Not on file    Transportation needs     Medical: Not on file     Non-medical: Not on file   Tobacco Use    Smoking status: Current Every Day Smoker     Packs/day: 0.50     Years: 40.00     Pack years: 20.00     Types: Cigarettes    Smokeless tobacco: Never Used   Substance and Sexual Activity    Alcohol use: No    Drug use: Yes     Types: Marijuana, Prescription     Comment: very rare    Sexual activity: Not on file   Lifestyle    Physical activity     Days per week: Not on file     Minutes per session: Not on file    Stress: Not on file   Relationships    Social connections     Talks on phone: Not on file     Gets together: Not on file     Attends Christianity service: Not on file     Active member of club or organization: Not on file     Attends meetings of clubs or organizations: Not on file     Relationship status: Not on file    Intimate partner violence     Fear of current or ex partner: Not on file     Emotionally abused: Not on file     Physically abused: Not on file     Forced sexual activity: Not on file   Other Topics Concern    Not on file   Social History Narrative    Not on file     Family History   Problem Relation Age of Onset    Heart Disease Mother     Hypertension Mother     Cancer Maternal Grandmother     Heart Disease Maternal Grandfather     Hypertension Maternal Uncle     Diabetes Maternal Uncle     Diabetes Brother     Cancer Paternal Uncle         Current Facility-Administered Medications   Medication Dose Route Frequency    morphine injection 2 mg  2 mg IntraVENous Q4H PRN    heparin 25,000 units in dextrose 500 mL infusion  12-25 Units/kg/hr IntraVENous TITRATE     Current Outpatient Medications   Medication Sig    lisinopriL (PRINIVIL, ZESTRIL) 20 mg tablet TAKE 1 TABLET BY MOUTH ONCE DAILY    fenofibrate (LOFIBRA) 160 mg tablet TAKE 1 TABLET BY MOUTH ONCE DAILY    clopidogrel (PLAVIX) 75 mg tablet Take 1 Tab by mouth daily.  HYDROcodone-acetaminophen (NORCO)  mg tablet Take 1 Tab by mouth every four (4) hours as needed. Max Daily Amount: 6 Tabs.  (Patient taking differently: Take 1 Tab by mouth every four (4) hours as needed. Indications: Pt takes 7.5 mg)    acetaminophen (TYLENOL) 500 mg tablet Take  by mouth every six (6) hours as needed for Pain.  NITROSTAT 0.4 mg SL tablet     omeprazole (PRILOSEC) 40 mg capsule Take 40 mg by mouth daily.  aspirin delayed-release 81 mg tablet Take  by mouth daily.  carvedilol (COREG) 6.25 mg tablet Take 1 Tab by mouth two (2) times daily (with meals).  atorvastatin (LIPITOR) 80 mg tablet Take 1 Tab by mouth daily.  zolpidem (AMBIEN) 10 mg tablet Take 10 mg by mouth nightly as needed.  diazepam (VALIUM) 5 mg tablet Take 5 mg by mouth every twelve (12) hours as needed.  diphenhydrAMINE (ALLERGY) 25 mg capsule Take 25 mg by mouth every six (6) hours as needed. Review of Systems    Review of Systems   Constitution: Negative. HENT: Negative. Eyes: Negative. Cardiovascular: Positive for chest pain. Respiratory: Negative. Endocrine: Negative. Hematologic/Lymphatic: Negative. Skin: Negative. Musculoskeletal: Negative. Gastrointestinal: Negative. Genitourinary: Negative. Neurological: Negative. Psychiatric/Behavioral: Negative. Allergic/Immunologic: Negative. Subjective:     Visit Vitals  BP (!) 151/82 (BP 1 Location: Left arm, BP Patient Position: At rest)   Pulse 76   Temp 97.9 °F (36.6 °C)   Resp 18   Ht 6' (1.829 m)   Wt 107 kg (236 lb)   SpO2 95%   BMI 32.01 kg/m²     Physical Exam  Constitutional:       Appearance: He is obese. Eyes:      Pupils: Pupils are equal, round, and reactive to light. Cardiovascular:      Rate and Rhythm: Normal rate and regular rhythm. Pulmonary:      Effort: Pulmonary effort is normal.      Breath sounds: Normal breath sounds. Abdominal:      General: Bowel sounds are normal.   Musculoskeletal: Normal range of motion. Skin:     General: Skin is warm and dry. Neurological:      General: No focal deficit present.       Mental Status: He is alert and oriented to person, place, and time. Psychiatric:         Mood and Affect: Mood normal.         Behavior: Behavior normal.         Thought Content: Thought content normal.         Judgment: Judgment normal.         Cardiographics  Telemetry: normal sinus rhythm  ECG: normal sinus rhythm, nonspecific ST and T waves changes  Echocardiogram:  pending    Labs:   Recent Results (from the past 24 hour(s))   CBC WITH AUTOMATED DIFF    Collection Time: 11/11/20 10:57 PM   Result Value Ref Range    WBC 12.5 (H) 4.3 - 11.1 K/uL    RBC 5.20 4.23 - 5.6 M/uL    HGB 15.4 13.6 - 17.2 g/dL    HCT 46.1 41.1 - 50.3 %    MCV 88.7 79.6 - 97.8 FL    MCH 29.6 26.1 - 32.9 PG    MCHC 33.4 31.4 - 35.0 g/dL    RDW 12.6 11.9 - 14.6 %    PLATELET 399 614 - 193 K/uL    MPV 9.4 9.4 - 12.3 FL    ABSOLUTE NRBC 0.00 0.0 - 0.2 K/uL    DF AUTOMATED      NEUTROPHILS 66 43 - 78 %    LYMPHOCYTES 24 13 - 44 %    MONOCYTES 6 4.0 - 12.0 %    EOSINOPHILS 3 0.5 - 7.8 %    BASOPHILS 1 0.0 - 2.0 %    IMMATURE GRANULOCYTES 1 0.0 - 5.0 %    ABS. NEUTROPHILS 8.2 1.7 - 8.2 K/UL    ABS. LYMPHOCYTES 3.0 0.5 - 4.6 K/UL    ABS. MONOCYTES 0.8 0.1 - 1.3 K/UL    ABS. EOSINOPHILS 0.4 0.0 - 0.8 K/UL    ABS. BASOPHILS 0.1 0.0 - 0.2 K/UL    ABS. IMM.  GRANS. 0.1 0.0 - 0.5 K/UL   PROTHROMBIN TIME + INR    Collection Time: 11/11/20 10:57 PM   Result Value Ref Range    Prothrombin time 12.5 12.5 - 14.7 sec    INR 0.9     PTT    Collection Time: 11/11/20 10:57 PM   Result Value Ref Range    aPTT 29.5 24.1 - 89.2 SEC   METABOLIC PANEL, COMPREHENSIVE    Collection Time: 11/11/20 10:57 PM   Result Value Ref Range    Sodium 143 136 - 145 mmol/L    Potassium 3.7 3.5 - 5.1 mmol/L    Chloride 111 (H) 98 - 107 mmol/L    CO2 25 21 - 32 mmol/L    Anion gap 7 7 - 16 mmol/L    Glucose 171 (H) 65 - 100 mg/dL    BUN 18 8 - 23 MG/DL    Creatinine 1.35 0.8 - 1.5 MG/DL    GFR est AA >60 >60 ml/min/1.73m2    GFR est non-AA 57 (L) >60 ml/min/1.73m2    Calcium 8.6 8.3 - 10.4 MG/DL    Bilirubin, total 0.3 0.2 - 1.1 MG/DL    ALT (SGPT) 24 12 - 65 U/L    AST (SGOT) 14 (L) 15 - 37 U/L    Alk. phosphatase 93 50 - 136 U/L    Protein, total 7.0 6.3 - 8.2 g/dL    Albumin 3.6 3.2 - 4.6 g/dL    Globulin 3.4 2.3 - 3.5 g/dL    A-G Ratio 1.1 (L) 1.2 - 3.5     MAGNESIUM    Collection Time: 11/11/20 10:57 PM   Result Value Ref Range    Magnesium 2.2 1.8 - 2.4 mg/dL   TROPONIN-HIGH SENSITIVITY    Collection Time: 11/11/20 10:57 PM   Result Value Ref Range    Troponin-High Sensitivity 299.7 (HH) 0 - 14 pg/mL       Patient has been seen and examined by Dr. Joy Chaidez and he agrees with the following assessment and plan:     Assessment/Plan:       Principal Problem:    NSTEMI (non-ST elevated myocardial infarction) -- admit and plan for LHC in AM.  Trend troponin levels and check echo. Continue heparin drip and nitro paste to chest q6h. NS @ 75 cc/hr. Continue ASA, plavix, BB, ACE and statin. Active Problems:    CAD (coronary artery disease), native coronary artery -- see above       Chronic systolic heart failure -- euvolemic. Continue BB and ACE. Monitor volume status closely with hydration for LHC. PUD (peptic ulcer disease) -- PPI ordered       COPD (chronic obstructive pulmonary disease) -- encouraged smoking cessation.         Hypertension -- continue home coreg and ace, monitor BP and titrate meds as needed       Hyperlipidemia -- on statin              GERD (gastroesophageal reflux disease)-- PPI ordered       S/P AAA (abdominal aortic aneurysm) repair -- in 2013      Overview: EVAR      Tobacco abuse -- cessation encouraged            Prince Barfield NP  11/12/2020 12:35 AM

## 2020-11-12 NOTE — PROGRESS NOTES
Bedside and Verbal shift change report to be given to [address] (oncoming nurse) by self (offgoing nurse). Report included the following information SBAR, Kardex, MAR and Recent Results. Left radial site c/d/i.

## 2020-11-12 NOTE — PROGRESS NOTES
Spiritual care visit,initial; Advance Directive consult. Request by patient. Patient thinks he may have a HCPOA, but is unsure.  left a copy with him to review or complete. Invited him to call for assistance if needed. Agueda Mendoza

## 2020-11-12 NOTE — PROGRESS NOTES
Bedside and Verbal shift change report to be given to self (oncoming nurse) by Tony Valentino (offgoing nurse). Report included the following information SBAR, Kardex, MAR and Recent Results.

## 2020-11-12 NOTE — PROGRESS NOTES
CM attempted to see pt for interview but pt out of the room in CVL. CM will f/u with results of LHC and DCP.

## 2020-11-12 NOTE — ROUTINE PROCESS
Bedside and Verbal shift change report given to [address] [place] [address] [address] (oncoming nurse) by self (offgoing nurse). Report included the following information SBAR, Kardex, MAR and Recent Results.  Heparin gtt verified with second RN

## 2020-11-13 VITALS
HEIGHT: 72 IN | OXYGEN SATURATION: 94 % | BODY MASS INDEX: 30.96 KG/M2 | SYSTOLIC BLOOD PRESSURE: 92 MMHG | HEART RATE: 52 BPM | WEIGHT: 228.6 LBS | DIASTOLIC BLOOD PRESSURE: 60 MMHG | TEMPERATURE: 97.5 F | RESPIRATION RATE: 18 BRPM

## 2020-11-13 LAB
ANION GAP SERPL CALC-SCNC: 5 MMOL/L (ref 7–16)
BUN SERPL-MCNC: 9 MG/DL (ref 8–23)
CALCIUM SERPL-MCNC: 8.5 MG/DL (ref 8.3–10.4)
CHLORIDE SERPL-SCNC: 114 MMOL/L (ref 98–107)
CHOLEST SERPL-MCNC: 213 MG/DL
CO2 SERPL-SCNC: 25 MMOL/L (ref 21–32)
CREAT SERPL-MCNC: 0.89 MG/DL (ref 0.8–1.5)
ERYTHROCYTE [DISTWIDTH] IN BLOOD BY AUTOMATED COUNT: 13 % (ref 11.9–14.6)
GLUCOSE SERPL-MCNC: 111 MG/DL (ref 65–100)
HCT VFR BLD AUTO: 41.8 % (ref 41.1–50.3)
HDLC SERPL-MCNC: 25 MG/DL (ref 40–60)
HDLC SERPL: 8.5 {RATIO}
HGB BLD-MCNC: 13.8 G/DL (ref 13.6–17.2)
LDLC SERPL CALC-MCNC: 156.8 MG/DL
LIPID PROFILE,FLP: ABNORMAL
MCH RBC QN AUTO: 29.7 PG (ref 26.1–32.9)
MCHC RBC AUTO-ENTMCNC: 33 G/DL (ref 31.4–35)
MCV RBC AUTO: 89.9 FL (ref 79.6–97.8)
NRBC # BLD: 0 K/UL (ref 0–0.2)
PLATELET # BLD AUTO: 176 K/UL (ref 150–450)
PMV BLD AUTO: 9.2 FL (ref 9.4–12.3)
POTASSIUM SERPL-SCNC: 3.8 MMOL/L (ref 3.5–5.1)
RBC # BLD AUTO: 4.65 M/UL (ref 4.23–5.6)
SODIUM SERPL-SCNC: 144 MMOL/L (ref 136–145)
TRIGL SERPL-MCNC: 156 MG/DL (ref 35–150)
VLDLC SERPL CALC-MCNC: 31.2 MG/DL (ref 6–23)
WBC # BLD AUTO: 9 K/UL (ref 4.3–11.1)

## 2020-11-13 PROCEDURE — 36415 COLL VENOUS BLD VENIPUNCTURE: CPT

## 2020-11-13 PROCEDURE — 80048 BASIC METABOLIC PNL TOTAL CA: CPT

## 2020-11-13 PROCEDURE — 74011250637 HC RX REV CODE- 250/637: Performed by: NURSE PRACTITIONER

## 2020-11-13 PROCEDURE — 80061 LIPID PANEL: CPT

## 2020-11-13 PROCEDURE — 85027 COMPLETE CBC AUTOMATED: CPT

## 2020-11-13 RX ADMIN — Medication 10 ML: at 05:14

## 2020-11-13 RX ADMIN — HYDROCODONE BITARTRATE AND ACETAMINOPHEN 1 TABLET: 10; 325 TABLET ORAL at 00:18

## 2020-11-13 NOTE — ROUTINE PROCESS
Bedside and Verbal shift change report given to self (oncoming nurse) by Rita Peres (offgoing nurse). Report included the following information SBAR, Kardex, MAR and Recent Results.

## 2020-11-13 NOTE — PROGRESS NOTES
Discharge instructions reviewed with Pt. Opportunity for questions provided. Pt voiced understanding of all discharge instructions. Pt very insistent on leaving before med admin this AM. States he will take all meds when he gets home. Pt educated and still eager to leave immediately. Primary RN updated.

## 2020-11-13 NOTE — ROUTINE PROCESS
Bedside and Verbal report given to self by Cristi Ortiz. Report included SBAR, Kardex, ED Summary, Procedure Summary, Intake and Output and Cardiac Rhythm SB. L-raidal site assessed and dressing noted to be C/D/I.

## 2020-11-13 NOTE — DISCHARGE INSTRUCTIONS
DISCHARGE SUMMARY from Nurse    DISPOSITION: The patient is being discharged home in stable condition on a low saturated fat, low cholesterol and low salt diet. The patient is instructed to advance activities as tolerated to the limit of fatigue or shortness of breath. The patient is instructed to avoid all heavy lifting, straining, stooping or squatting for 3-5 days. The patient is instructed to watch the cath site for bleeding/oozing; if seen, the patient is instructed to apply firm pressure with a clean cloth and call [facility] at [phone]. The patient is instructed to watch for signs of infection which include: increasing area of redness, fever/hot to touch or purulent drainage at the catheterization site. The patient is instructed not to soak in a bathtub for 7-10 days, but is cleared to shower. The patient is instructed to call the office or return to the ER for immediate evaluation for any shortness of breath or chest pain not relieved by NTG. PATIENT INSTRUCTIONS:    After general anesthesia or intravenous sedation, for 24 hours or while taking prescription Narcotics:  · Limit your activities  · Do not drive and operate hazardous machinery  · Do not make important personal or business decisions  · Do  not drink alcoholic beverages  · If you have not urinated within 8 hours after discharge, please contact your surgeon on call. Report the following to your surgeon:  · Excessive pain, swelling, redness or odor of or around the surgical area  · Temperature over 100.5  · Nausea and vomiting lasting longer than 4 hours or if unable to take medications  · Any signs of decreased circulation or nerve impairment to extremity: change in color, persistent  numbness, tingling, coldness or increase pain  · Any questions    What to do at Home:      *  Please give a list of your current medications to your Primary Care Provider.     *  Please update this list whenever your medications are discontinued, doses are      changed, or new medications (including over-the-counter products) are added. *  Please carry medication information at all times in case of emergency situations. These are general instructions for a healthy lifestyle:    No smoking/ No tobacco products/ Avoid exposure to second hand smoke  Surgeon General's Warning:  Quitting smoking now greatly reduces serious risk to your health. Obesity, smoking, and sedentary lifestyle greatly increases your risk for illness    A healthy diet, regular physical exercise & weight monitoring are important for maintaining a healthy lifestyle    You may be retaining fluid if you have a history of heart failure or if you experience any of the following symptoms:  Weight gain of 3 pounds or more overnight or 5 pounds in a week, increased swelling in our hands or feet or shortness of breath while lying flat in bed. Please call your doctor as soon as you notice any of these symptoms; do not wait until your next office visit. The discharge information has been reviewed with the {PATIENT PARENT GUARDIAN:31444}. The {PATIENT PARENT GUARDIAN:72541} verbalized understanding. Discharge medications reviewed with the {Dishcar meds reviewed TRBO:66691} and appropriate educational materials and side effects teaching were provided. ___________________________________________________________________________________________________________________________________     Reducing Heart Attack Risk With Daily Medicine: Care Instructions  Your Care Instructions    If you are at risk for a heart attack, there are many medicines that can reduce your risk. These include:  · ACE inhibitors or ARBs. These are types of blood pressure medicines. They can reduce the risk of heart attacks and strokes if you are at high risk. · Statins and other cholesterol medicines. These lower cholesterol. They can also reduce the risk of a stroke. · Aspirin and other antiplatelets.  These prevent blood clots. They can help certain people lower their risk of a heart attack or stroke. · Beta-blocker medicines. These are a type of blood pressure and heart medicine. They can reduce the chance of early death if you have had a heart attack. All medicines can cause side effects. So it is important to understand the pros and cons of any medicine you take. It is also important to take your medicines exactly as your doctor tells you to. Follow-up care is a key part of your treatment and safety. Be sure to make and go to all appointments, and call your doctor if you are having problems. It's also a good idea to know your test results and keep a list of the medicines you take. ACE inhibitors  ACE (angiotensin-converting enzyme) inhibitors are used for three main reasons. They lower blood pressure, protect the kidneys, and prevent heart attacks and strokes. Examples include benazepril (Lotensin), lisinopril (Prinivil, Zestril), and ramipril (Altace). An angiotensin II receptor blocker (ARB) may be used instead of an ACE inhibitor. ARBs help you in the same ways as ACE inhibitors. Examples include candesartan (Atacand), irbesartan (Avapro), and losartan (Cozaar). Before you start taking an ACE inhibitor or an ARB, make sure your doctor knows if:  · You are taking a water pill (diuretic). · You are taking potassium pills or using salt substitutes. · You are pregnant or breastfeeding. · You have had a kidney transplant or other kidney problems. ACE inhibitors and ARBs can cause side effects. Call your doctor right away if you have:  · Trouble breathing. · Swelling in your face, head, neck, or tongue. · Dizziness or lightheadedness. · A dry cough. Statins  Statins lower cholesterol. Examples include atorvastatin (Lipitor), lovastatin (Mevacor), pravastatin (Pravachol), and simvastatin (Zocor).   Before you start taking a statin, make sure your doctor knows if:  · You have had a kidney transplant or other kidney problems. · You have liver disease. · You take any other prescription medicine, over-the-counter medicine, vitamins, supplements, or herbal remedies. · You are pregnant or breastfeeding. Statins can cause side effects. Call your doctor right away if you have:  · New, severe muscle aches. · Brown urine. Aspirin  Taking an aspirin every day can lower your risk for a heart attack. A heart attack occurs when a blood vessel in the heart gets blocked. When this happens, oxygen can't get to the heart muscle, and part of the heart dies. Aspirin can help prevent blood clots that can block the blood vessels. Talk to your doctor before you start taking aspirin every day. He or she may recommend that you take one low-dose aspirin (81 mg) tablet each day, with a meal and a full glass of water. Taking aspirin isn't right for everyone. This is because it can cause serious bleeding. And you may not be able to use aspirin if you:  · Have asthma. · Have an ulcer or other stomach problem. · Take some other medicine (called a blood thinner) that prevents blood clots. · Are allergic to aspirin. Before having a surgery or procedure, tell your doctor or dentist that you take aspirin. He or she will tell you if you should stop taking aspirin beforehand. Make sure that you understand exactly what your doctor wants you to do. Aspirin can cause side effects. Call your doctor right away if you have:  · Unusual bleeding or bruising. · Nausea, vomiting, or heartburn. · Black or bloody stools. Beta-blockers  Beta-blockers are used for three main reasons. They lower blood pressure, relieve angina symptoms (such as chest pain or pressure), and reduce the chances of a second heart attack. They include atenolol (Tenormin), carvedilol (Coreg), and metoprolol (Lopressor). Before you start taking a beta-blocker, make sure your doctor knows if you have:  · Severe asthma or frequent asthma attacks.   · A very slow pulse (less than 55 beats a minute). Beta-blockers can cause side effects. Call your doctor right away if you have:  · Wheezing or trouble breathing. · Dizziness or lightheadedness. · Asthma that gets worse. When should you call for help? Watch closely for changes in your health, and be sure to contact your doctor if you have any problems. Where can you learn more? Go to http://elda-brea.info/. Enter R428 in the search box to learn more about \"Reducing Heart Attack Risk With Daily Medicine: Care Instructions. \"  Current as of: July 22, 2018  Content Version: 12.1  © 1522-0458 [employer]. Care instructions adapted under license by [employer] (which disclaims liability or warranty for this information). If you have questions about a medical condition or this instruction, always ask your healthcare professional. [address] any warranty or liability for your use of this information. Left Heart Catheterization: About This Test  What is it? Cardiac catheterization is a test to check the left side of your heart. Your doctor might look at the shape of your heart, the motion of your heart, or the blood pressure inside the chambers. Why is this test done? This test gives information about how your heart is working. It can:  · Check blood flow and blood pressure in the chambers of the heart. · Check the pumping action of the heart. · Find out if a heart defect is present and how severe it is. · Find out how well the heart valves work. What happens during the test?  · You will get medicine to help you relax. · A thin tube called a catheter is put into a blood vessel in the groin or the arm. The doctor moves the catheter through the blood vessel into your heart. · You will get a shot to numb the skin where the catheter goes in. You may feel pressure when the doctor moves the catheter through your blood vessel into your heart.   · Dye may be injected into your heart. Your doctor can watch on special monitors as the dye moves in your heart. The dye helps your doctor see blood flow in your heart. · You may feel hot or flushed for several seconds when the dye is put in.  · If a heart defect is found, cardiac catheterization sometimes is used to correct it during the test.  How long does it take? · The test will take about 30 minutes. If a problem is found and the doctor treats it, it can take a few hours longer. What happens after the test?  · You will stay in a room for at least a few hours to make sure the catheter site starts to heal. You may have a bandage or a compression device on your groin or arm to prevent bleeding. · If the catheter was placed in your groin, you may lie in bed for a few hours. If the catheter was put in your arm, you will need to keep your arm still for at least 1 hour. · You may or may not need to stay in the hospital overnight. You will get more instructions for what to do at home. · Drink plenty of fluids for several hours after the test.  Follow-up care is a key part of your treatment and safety. Be sure to make and go to all appointments, and call your doctor if you are having problems. It's also a good idea to know your test results and keep a list of the medicines you take. Where can you learn more? Go to http://www.gray.com/. Enter W306 in the search box to learn more about \"Left Heart Catheterization: About This Test.\"  Current as of: July 22, 2018  Content Version: 12.1  © 4835-7626 [employer]. Care instructions adapted under license by [employer] (which disclaims liability or warranty for this information). If you have questions about a medical condition or this instruction, always ask your healthcare professional. [place] [address] any warranty or liability for your use of this information.                        Cardiac Catheterization/Angiography Discharge Instructions    *Check the puncture site frequently for swelling or bleeding. If you see any bleeding, lie down and apply pressure over the area with a clean town or washcloth. Notify your doctor for any redness, swelling, drainage or oozing from the puncture site. Notify your doctor for any fever or chills. *If the leg or arm with the puncture becomes cold, numb or painful, call Dr Jorgito Arroyo at  Jorgito Arroyo    *Activity should be limited for the next 48 hours. Climb stairs as little as possible and avoid any stooping, bending or strenuous activity for 48 hours. No heavy lifting (anything over 10 pounds) for three days. *Do not drive for 48 hours. *You may resume your usual diet. Drink more fluids than usual.    *Have a responsible person drive you home and stay with you for at least 24 hours after your heart catheterization/angiography. *You may remove the bandage from your {ARM/GROIN:83941} in 24 hours. You may shower in 24 hours. No tub baths, hot tubs or swimming for one week. Do not place any lotions, creams, powders, ointments over the puncture site for one week. You may place a clean band-aid over the puncture site each day for 5 days. Change this daily. Learning About Coronary Artery Disease (CAD)  What is coronary artery disease? Coronary artery disease (CAD) occurs when plaque builds up in the arteries that bring oxygen-rich blood to your heart. Plaque is a fatty substance made of cholesterol, calcium, and other substances in the blood. This process is called hardening of the arteries, or atherosclerosis. What happens when you have coronary artery disease? · Plaque may narrow the coronary arteries. Narrowed arteries cause poor blood flow. This can lead to angina symptoms such as chest pain or discomfort. If blood flow is completely blocked, you could have a heart attack. · You can slow CAD and reduce the risk of future problems by making changes in your lifestyle.  These include quitting smoking and eating heart-healthy foods. · Treatments for CAD, along with changes in your lifestyle, can help you live a longer and healthier life. How can you prevent coronary artery disease? · Do not smoke. It may be the best thing you can do to prevent heart disease. If you need help quitting, talk to your doctor about stop-smoking programs and medicines. These can increase your chances of quitting for good. · Be active. Get at least 30 minutes of exercise on most days of the week. Walking is a good choice. You also may want to do other activities, such as running, swimming, cycling, or playing tennis or team sports. · Eat heart-healthy foods. Eat more fruits and vegetables and less foods that contain saturated and trans fats. Limit alcohol, sodium, and sweets. · Stay at a healthy weight. Lose weight if you need to. · Manage other health problems such as diabetes, high blood pressure, and high cholesterol. · If you have talked about it with your doctor, take a low-dose aspirin every day. Aspirin can help certain people lower their risk of a heart attack or stroke. But taking aspirin isn't right for everyone, because it can cause serious bleeding. Do not start taking daily aspirin unless your doctor knows about it. How is coronary artery disease treated? · Your doctor will suggest that you make lifestyle changes. For example, your doctor may ask you to eat healthy foods, quit smoking, lose extra weight, and be more active. · You will have to take medicines. · Your doctor may suggest a procedure to open narrowed or blocked arteries. This is called angioplasty. Or your doctor may suggest using healthy blood vessels to create detours around narrowed or blocked arteries. This is called bypass surgery. Follow-up care is a key part of your treatment and safety. Be sure to make and go to all appointments, and call your doctor if you are having problems.  It's also a good idea to know your test results and keep a list of the medicines you take. Where can you learn more? Go to http://www.gray.com/. Enter [id number] in the search box to learn more about \"Learning About Coronary Artery Disease (CAD). \"  Current as of: July 22, 2018  Content Version: 12.1  © 6209-1257 [employer]. Care instructions adapted under license by [employer] (which disclaims liability or warranty for this information). If you have questions about a medical condition or this instruction, always ask your healthcare professional. [place] [address] any warranty or liability for your use of this information. Heart Attack: Care Instructions  Your Care Instructions    A heart attack (myocardial infarction, or MI) occurs when one or more of the coronary arteries, which supply the heart with oxygen-rich blood, is blocked. A blockage usually occurs when plaque inside the artery breaks open and a blood clot forms in the artery. After a heart attack, you may be worried about your future. Over the next several weeks, your heart will start to heal. Though it can be hard to break old habits, you can prevent another heart attack by making some lifestyle changes and by taking medicines. You may use this information for ideas about what to do at home to speed your recovery. Follow-up care is a key part of your treatment and safety. Be sure to make and go to all appointments, and call your doctor if you are having problems. It's also a good idea to know your test results and keep a list of the medicines you take. How can you care for yourself at home? Activity    · Until your doctor says it is okay, do not do strenuous exercise. And do not lift, pull, or push anything heavy. Ask your doctor what types of activities are safe for you. · If your doctor has not set you up with a cardiac rehabilitation (rehab) program, talk to him or her about whether that is right for you.  Cardiac rehab includes supervised exercise. It also includes help with diet and lifestyle changes and emotional support. It may reduce your risk of future heart problems. · Increase your activities slowly. Take short rest breaks when you get tired. · If your doctor recommends it, get more exercise. Walking is a good choice. Bit by bit, increase the amount you walk every day. Try for at least 30 minutes on most days of the week. You also may want to swim, bike, or do other activities. Talk with your doctor before you start an exercise program to make sure it is safe for you. · Ask your doctor when you can drive, go back to work, and do other daily activities again. · You can have sex as soon as you feel ready for it. Often this means when you can easily walk around or climb stairs. Talk with your doctor if you have any concerns. If you are taking nitroglycerin, do not take erection-enhancing medicine such as sildenafil (Viagra), tadalafil (Cialis), or vardenafil (Levitra) . Lifestyle changes    · Do not smoke. Smoking increases your risk of another heart attack. If you need help quitting, talk to your doctor about stop-smoking programs and medicines. These can increase your chances of quitting for good. · Eat a heart-healthy diet that is low in saturated fat and salt, and is full of fruits, vegetables and whole-grains. Eat at least two servings of fish each week. You may get more details about how to eat healthy. But these tips can help you get started. · Stay at a healthy weight, or lose weight if you need to. Medicines    · Be safe with medicines. Take your medicines exactly as prescribed. Call your doctor if you think you are having a problem with your medicine. You will get more details on the specific medicines your doctor prescribes. Do not stop taking your medicine unless your doctor tells you to. Not taking your medicine might raise your risk of having another heart attack.      · You may need several medicines to help lower your risk of another heart attack. These include:  ? Blood pressure medicines such as angiotensin-converting enzyme (ACE) inhibitors, ARBs (angiotensin II receptor blockers), and beta-blockers. ? Cholesterol medicine called statins. ? Aspirin and other blood thinners. These prevent blood clots that can cause a heart attack. · If your doctor has given you nitroglycerin, keep it with you at all times. If you have angina symptoms, such as chest pain or pressure, sit down and rest. Take the first dose of nitroglycerin as directed. If symptoms get worse or are not getting better within 5 minutes, call 911 right away. Stay on the phone. The emergency  will tell you what to do. · Do not take any over-the-counter medicines, vitamins, or herbal products without talking to your doctor first.    Staying healthy    · Manage other health conditions such as high blood pressure and diabetes. · Avoid colds and flu. Get a pneumococcal vaccine shot. If you have had one before, ask your doctor whether you need another dose. Get the flu vaccine every year. If you must be around people with colds or flu, wash your hands often. · Be sure to tell your doctor about any angina symptoms you have had, even if they went away. Pay attention to your angina symptoms. Know what is typical for you and learn how to control it. Know when to call for help. · Talk to your family, friends, or a counselor about your feelings. It is normal to feel frightened, angry, hopeless, helpless, and even guilty. Talking openly about bad feelings can help you cope. If you have symptoms of depression, talk to your doctor. When should you call for help? Call 911 anytime you think you may need emergency care. For example, call if:    · You have symptoms of a heart attack. These may include:  ? Chest pain or pressure, or a strange feeling in the chest.  ? Sweating. ? Shortness of breath.   ? Nausea or vomiting. ? Pain, pressure, or a strange feeling in the back, neck, jaw, or upper belly or in one or both shoulders or arms. ? Lightheadedness or sudden weakness. ? A fast or irregular heartbeat. After you call 911, the  may tell you to chew 1 adult-strength or 2 to 4 low-dose aspirin. Wait for an ambulance. Do not try to drive yourself. · You have angina symptoms (such as chest pain or pressure) that do not go away with rest or are not getting better within 5 minutes after you take a dose of nitroglycerin. · You passed out (lost consciousness). · You feel like you are having another heart attack. Call your doctor now or seek immediate medical care if:    · You are having angina symptoms, such as chest pain or pressure, more often than usual, or the symptoms are different or worse than usual.     · You have new or increased shortness of breath. · You are dizzy or lightheaded, or you feel like you may faint. Watch closely for changes in your health, and be sure to contact your doctor if you have any problems. Where can you learn more? Go to http://www.gray.com/. Enter [id number] in the search box to learn more about \"Heart Attack: Care Instructions. \"  Current as of: July 22, 2018  Content Version: 12.1  © 7889-1090 [employer]. Care instructions adapted under license by [employer] (which disclaims liability or warranty for this information). If you have questions about a medical condition or this instruction, always ask your healthcare professional. [place] [address] any warranty or liability for your use of this information. Percutaneous Coronary Intervention: What to Expect at [facility]    Percutaneous coronary intervention (PCI) is the name for procedures that are used to open a narrowed or blocked coronary artery.  The two most common PCI procedures are coronary angioplasty and coronary stent placement. Your groin or arm may have a bruise and feel sore for a day or two after a percutaneous coronary intervention (PCI). You can do light activities around the house, but nothing strenuous for several days. This care sheet gives you a general idea about how long it will take for you to recover. But each person recovers at a different pace. Follow the steps below to get better as quickly as possible. How can you care for yourself at home? Activity    · If the doctor gave you a sedative:  ? For 24 hours, don't do anything that requires attention to detail, such as going to work, making important decisions, or signing any legal documents. It takes time for the medicine's effects to completely wear off.  ? For your safety, do not drive or operate any machinery that could be dangerous. Wait until the medicine wears off and you can think clearly and react easily. · Do not do strenuous exercise and do not lift, pull, or push anything heavy until your doctor says it is okay. This may be for a day or two. You can walk around the house and do light activity, such as cooking. · If the catheter was placed in your groin, try not to walk up stairs for the first couple of days. · If the catheter was placed in your arm near your wrist, do not bend your wrist deeply for the first couple of days. Be careful using your hand to get into and out of a chair or bed. · Carry your stent identification card with you at all times. · If your doctor recommends it, get more exercise. Walking is a good choice. Bit by bit, increase the amount you walk every day. Try for at least 30 minutes on most days of the week. Diet    · Drink plenty of fluids to help your body flush out the dye. If you have kidney, heart, or liver disease and have to limit fluids, talk with your doctor before you increase the amount of fluids you drink.      · Keep eating a heart-healthy diet that has lots of fruits, vegetables, and whole grains. If you have not been eating this way, talk to your doctor. You also may want to talk to a dietitian. This expert can help you to learn about healthy foods and plan meals. Medicines    · Your doctor will tell you if and when you can restart your medicines. He or she will also give you instructions about taking any new medicines. · If you take blood thinners, such as warfarin (Coumadin), clopidogrel (Plavix), or aspirin, be sure to talk to your doctor. He or she will tell you if and when to start taking those medicines again. Make sure that you understand exactly what your doctor wants you to do. · Your doctor will prescribe blood-thinning medicines. You will likely take aspirin plus another antiplatelet, such as clopidogrel (Plavix). It is very important that you take these medicines exactly as directed. These medicines help keep the coronary artery open and reduce your risk of a heart attack. · Call your doctor if you think you are having a problem with your medicine. Care of the catheter site    · For 1 or 2 days, keep a bandage over the spot where the catheter was inserted. The bandage probably will fall off in this time. · Put ice or a cold pack on the area for 10 to 20 minutes at a time to help with soreness or swelling. Put a thin cloth between the ice and your skin. · You may shower 24 to 48 hours after the procedure, if your doctor okays it. Pat the incision dry. · Do not soak the catheter site until it is healed. Don't take a bath for 1 week, or until your doctor tells you it is okay. · Watch for bleeding from the site. A small amount of blood (up to the size of a quarter) on the bandage can be normal.     · If you are bleeding, lie down and press on the area for 15 minutes to try to make it stop. If the bleeding does not stop, call your doctor or seek immediate medical care. Follow-up care is a key part of your treatment and safety.  Be sure to make and go to all appointments, and call your doctor if you are having problems. It's also a good idea to know your test results and keep a list of the medicines you take. When should you call for help? Call 911 anytime you think you may need emergency care. For example, call if:    · You passed out (lost consciousness). · You have severe trouble breathing. · You have sudden chest pain and shortness of breath, or you cough up blood. · You have symptoms of a heart attack, such as:  ? Chest pain or pressure. ? Sweating. ? Shortness of breath. ? Nausea or vomiting. ? Pain that spreads from the chest to the neck, jaw, or one or both shoulders or arms. ? Dizziness or lightheadedness. ? A fast or uneven pulse. After calling 911, chew 1 adult-strength aspirin. Wait for an ambulance. Do not try to drive yourself. · You have been diagnosed with angina, and you have angina symptoms that do not go away with rest or are not getting better within 5 minutes after you take one dose of nitroglycerin. Call your doctor now or seek immediate medical care if:    · You are bleeding from the area where the catheter was put in your artery. · You have a fast-growing, painful lump at the catheter site. · You have signs of infection, such as:  ? Increased pain, swelling, warmth, or redness. ? Red streaks leading from the catheter site. ? Pus draining from the catheter site. ? A fever. · Your leg, arm, or hand is painful, looks blue, or feels cold, numb, or tingly. Watch closely for changes in your health, and be sure to contact your doctor if you have any problems. Where can you learn more? Go to http://www.gray.com/. Enter Q430 in the search box to learn more about \"Percutaneous Coronary Intervention: What to Expect at Home. \"  Current as of: July 22, 2018  Content Version: 12.1  © 5745-7707 [employer], [employer].  Care instructions adapted under license by [employer] (which disclaims liability or warranty for this information). If you have questions about a medical condition or this instruction, always ask your healthcare professional. [address] any warranty or liability for your use of this information.

## 2020-11-13 NOTE — ROUTINE PROCESS
Bedside and Verbal shift change report given to Westley Lopez (oncoming nurse) by self (offgoing nurse). Report included the following information SBAR, Kardex, MAR and Recent Results.

## 2020-11-13 NOTE — DISCHARGE SUMMARY
[address] Cardiology Discharge Summary     Patient ID:  Tereza Davidson  [id number]  70 y.o.  1955    Admit date: 11/11/2020    Discharge date:  11/13/2020    Admitting Physician: Prosper Jimenez MD     Discharge Physician: /Dr. Jose Mancera    Admission Diagnoses: NSTEMI (non-ST elevated myocardial infarction) [facility]) [I21.4]    Discharge Diagnoses:    Diagnosis    [facility]    S/P CABG (coronary artery bypass graft)    CVA Personal Hx of TIA\T\CVA w/o residual deficit    Tobacco abuse    S/P AAA (abdominal aortic aneurysm) repair    PVD (peripheral vascular disease) ([facility]    COPD (chronic obstructive pulmonary disease) ([facility]    Hypertension    Hyperlipidemia    GERD (gastroesophageal reflux disease)    Anxiety    CAD (coronary artery disease), native coronary artery    Chronic systolic heart failure [facility])       Cardiology Procedures this admission:  Left heart catheterization with PCI  EchoCardiogram  Consults: None    Hospital Course: Patient presented to the ER with c/o progressive chest pain over several weeks. The patient described the pain as SS with radiation to back and left arm. HS troponin peaked at 1300. He was placed on heparin drip and admitted for further treatment. Patient underwent cardiac catheterization by Dr. Jose Mancera. Patient was found to have 95% stenosis of the SVG to OM that was stented with a 3.5 x 30 mm Cashion BRAVO with 0% residual stenosis. Patient was also found to have 100% occlusion of SVG to RCA -- 3/4 patent grafts. Patient tolerated the procedure well and was taken to the telemetry floor for recovery. Echo showed EF 40-45%, akinesis of the mid inferoseptal and basal-mid inferior wall(s), grade 2 diastolic dysfunction, moderately dilated LA, mild MR and TR. The following morning, he was up feeling well without any complaints of chest pain or shortness of breath.  Patient's left radial cath site was clean, dry and intact without hematoma or bruit. Patient's labs were WNL. Patient was seen and examined by Dr. Annelise Oden and determined stable and ready for discharge. Patient was instructed on the importance of medication compliance including taking ASA and Plavix everyday without missing a dose. After receiving drug eluting stents, the patient will remain on dual anti-platelet therapy for 1 year. For maximized medical therapy for CAD and CHF EF 40-45%, patient will continue ACE, Beta Blocker and Statin  The patient will follow up with [facility]iology -- Dr. Annelise Oden for a TC 7 on Thursday 11/19 at [address] [place] [address]. Patient has been referred to cardiac rehab. DISPOSITION: The patient is being discharged home in stable condition on a low saturated fat, low cholesterol and low salt diet. The patient is instructed to advance activities as tolerated to the limit of fatigue or shortness of breath. The patient is instructed to avoid all heavy lifting, straining, stooping or squatting for 3-5 days. The patient is instructed to watch the cath site for bleeding/oozing; if seen, the patient is instructed to apply firm pressure with a clean cloth and call [facility]iology at [phone]. The patient is instructed to watch for signs of infection which include: increasing area of redness, fever/hot to touch or purulent drainage at the catheterization site. The patient is instructed not to soak in a bathtub for 7-10 days, but is cleared to shower. The patient is instructed to call the office or return to the ER for immediate evaluation for any shortness of breath or chest pain not relieved by NTG. Discharge Exam:   Visit Vitals  /73 (BP 1 Location: Left arm, BP Patient Position: At rest)   Pulse 60   Temp 97.6 °F (36.4 °C)   Resp 18   Ht 6' (1.829 m)   Wt 106.5 kg (234 lb 14.4 oz)   SpO2 97%   BMI 31.86 kg/m²     Patient has been seen by Dr. Annelise Oden: see his progress note for exam details.     Recent Results (from the past 24 hour(s))   EKG, 12 LEAD, INITIAL    Collection Time: 11/11/20 10:54 PM   Result Value Ref Range    Ventricular Rate 74 BPM    Atrial Rate 74 BPM    P-R Interval 160 ms    QRS Duration 98 ms    Q-T Interval 376 ms    QTC Calculation (Bezet) 417 ms    Calculated P Axis 59 degrees    Calculated R Axis 52 degrees    Calculated T Axis -94 degrees    Diagnosis       !! AGE AND GENDER SPECIFIC ECG ANALYSIS !! Normal sinus rhythm  Inferior infarct (cited on or before 24-SEP-2011)  Cannot rule out Anterior infarct , age undetermined  Abnormal ECG  When compared with ECG of 08-AUG-2016 15:14,  No significant change was found  Confirmed by [facility]IL[facility]  MD ()TEODORO ([id number]) on 11/12/2020 3:01:05 PM     CBC WITH AUTOMATED DIFF    Collection Time: 11/11/20 10:57 PM   Result Value Ref Range    WBC 12.5 (H) 4.3 - 11.1 K/uL    RBC 5.20 4.23 - 5.6 M/uL    HGB 15.4 13.6 - 17.2 g/dL    HCT 46.1 41.1 - 50.3 %    MCV 88.7 79.6 - 97.8 FL    MCH 29.6 26.1 - 32.9 PG    MCHC 33.4 31.4 - 35.0 g/dL    RDW 12.6 11.9 - 14.6 %    PLATELET 902 881 - 665 K/uL    MPV 9.4 9.4 - 12.3 FL    ABSOLUTE NRBC 0.00 0.0 - 0.2 K/uL    DF AUTOMATED      NEUTROPHILS 66 43 - 78 %    LYMPHOCYTES 24 13 - 44 %    MONOCYTES 6 4.0 - 12.0 %    EOSINOPHILS 3 0.5 - 7.8 %    BASOPHILS 1 0.0 - 2.0 %    IMMATURE GRANULOCYTES 1 0.0 - 5.0 %    ABS. NEUTROPHILS 8.2 1.7 - 8.2 K/UL    ABS. LYMPHOCYTES 3.0 0.5 - 4.6 K/UL    ABS. MONOCYTES 0.8 0.1 - 1.3 K/UL    ABS. EOSINOPHILS 0.4 0.0 - 0.8 K/UL    ABS. BASOPHILS 0.1 0.0 - 0.2 K/UL    ABS. IMM.  GRANS. 0.1 0.0 - 0.5 K/UL   PROTHROMBIN TIME + INR    Collection Time: 11/11/20 10:57 PM   Result Value Ref Range    Prothrombin time 12.5 12.5 - 14.7 sec    INR 0.9     PTT    Collection Time: 11/11/20 10:57 PM   Result Value Ref Range    aPTT 29.5 24.1 - 42.8 SEC   METABOLIC PANEL, COMPREHENSIVE    Collection Time: 11/11/20 10:57 PM   Result Value Ref Range    Sodium 143 136 - 145 mmol/L    Potassium 3.7 3.5 - 5.1 mmol/L    Chloride 111 (H) 98 - 107 mmol/L    CO2 25 21 - 32 mmol/L    Anion gap 7 7 - 16 mmol/L    Glucose 171 (H) 65 - 100 mg/dL    BUN 18 8 - 23 MG/DL    Creatinine 1.35 0.8 - 1.5 MG/DL    GFR est AA >60 >60 ml/min/1.73m2    GFR est non-AA 57 (L) >60 ml/min/1.73m2    Calcium 8.6 8.3 - 10.4 MG/DL    Bilirubin, total 0.3 0.2 - 1.1 MG/DL    ALT (SGPT) 24 12 - 65 U/L    AST (SGOT) 14 (L) 15 - 37 U/L    Alk. phosphatase 93 50 - 136 U/L    Protein, total 7.0 6.3 - 8.2 g/dL    Albumin 3.6 3.2 - 4.6 g/dL    Globulin 3.4 2.3 - 3.5 g/dL    A-G Ratio 1.1 (L) 1.2 - 3.5     MAGNESIUM    Collection Time: 11/11/20 10:57 PM   Result Value Ref Range    Magnesium 2.2 1.8 - 2.4 mg/dL   TROPONIN-HIGH SENSITIVITY    Collection Time: 11/11/20 10:57 PM   Result Value Ref Range    Troponin-High Sensitivity 299.7 (HH) 0 - 14 pg/mL   EKG, 12 LEAD, SUBSEQUENT    Collection Time: 11/11/20 11:24 PM   Result Value Ref Range    Ventricular Rate 69 BPM    Atrial Rate 69 BPM    P-R Interval 168 ms    QRS Duration 102 ms    Q-T Interval 402 ms    QTC Calculation (Bezet) 430 ms    Calculated P Axis 70 degrees    Calculated R Axis 56 degrees    Calculated T Axis -64 degrees    Diagnosis       !! AGE AND GENDER SPECIFIC ECG ANALYSIS !!   Sinus rhythm with Premature atrial complexes  Possible Inferior infarct (cited on or before 24-SEP-2011)  Anterior infarct (cited on or before 18-APR-2016)  Abnormal ECG  When compared with ECG of 11-NOV-2020 22:54,  Premature atrial complexes are now Present  Confirmed by Brando Frost MD (), Judge Mercedes ([id number]) on 11/12/2020 6:02:58 PM     METABOLIC PANEL, BASIC    Collection Time: 11/12/20  5:00 AM   Result Value Ref Range    Sodium 143 138 - 145 mmol/L    Potassium 3.6 3.5 - 5.1 mmol/L    Chloride 112 (H) 98 - 107 mmol/L    CO2 25 21 - 32 mmol/L    Anion gap 6 (L) 7 - 16 mmol/L    Glucose 128 (H) 65 - 100 mg/dL    BUN 16 8 - 23 MG/DL    Creatinine 1.09 0.8 - 1.5 MG/DL    GFR est AA >60 >60 ml/min/1.73m2    GFR est non-AA >60 >60 ml/min/1.73m2 Calcium 8.9 8.3 - 10.4 MG/DL   TROPONIN-HIGH SENSITIVITY    Collection Time: 11/12/20  5:00 AM   Result Value Ref Range    Troponin-High Sensitivity 1,370.6 (HH) 0 - 14 pg/mL   HEPARIN XA UFH    Collection Time: 11/12/20  6:47 AM   Result Value Ref Range    Heparin Xa UFH 0.27 (L) 0.3 - 0.7 IU/mL         Patient Instructions:   Current Discharge Medication List      CONTINUE these medications which have NOT CHANGED    Details   lisinopriL (PRINIVIL, ZESTRIL) 20 mg tablet TAKE 1 TABLET BY MOUTH ONCE DAILY  Qty: 90 Tab, Refills: 1      fenofibrate (LOFIBRA) 160 mg tablet TAKE 1 TABLET BY MOUTH ONCE DAILY  Qty: 90 Tab, Refills: 1      clopidogrel (PLAVIX) 75 mg tablet Take 1 Tab by mouth daily. Qty: 30 Tab, Refills: 11      HYDROcodone-acetaminophen (NORCO)  mg tablet Take 1 Tab by mouth every four (4) hours as needed. Max Daily Amount: 6 Tabs. Qty: 20 Tab, Refills: 0      acetaminophen (TYLENOL) 500 mg tablet Take  by mouth every six (6) hours as needed for Pain. omeprazole (PRILOSEC) 40 mg capsule Take 40 mg by mouth daily. aspirin delayed-release 81 mg tablet Take  by mouth daily. carvedilol (COREG) 6.25 mg tablet Take 1 Tab by mouth two (2) times daily (with meals). Qty: 60 Tab, Refills: 11      atorvastatin (LIPITOR) 80 mg tablet Take 1 Tab by mouth daily. Qty: 30 Tab, Refills: 11      zolpidem (AMBIEN) 10 mg tablet Take 10 mg by mouth nightly as needed. diazepam (VALIUM) 5 mg tablet Take 5 mg by mouth every twelve (12) hours as needed. diphenhydrAMINE (ALLERGY) 25 mg capsule Take 25 mg by mouth every six (6) hours as needed.       NITROSTAT 0.4 mg SL tablet            Dr. Viridiana Vo

## 2021-12-02 ENCOUNTER — APPOINTMENT (OUTPATIENT)
Dept: NON INVASIVE DIAGNOSTICS | Age: 66
End: 2021-12-02
Attending: NURSE PRACTITIONER
Payer: MEDICARE

## 2021-12-02 ENCOUNTER — HOSPITAL ENCOUNTER (OUTPATIENT)
Age: 66
Setting detail: OBSERVATION
Discharge: HOME | End: 2021-12-02
Attending: EMERGENCY MEDICINE | Admitting: INTERNAL MEDICINE
Payer: MEDICARE

## 2021-12-02 ENCOUNTER — APPOINTMENT (OUTPATIENT)
Dept: GENERAL RADIOLOGY | Age: 66
End: 2021-12-02
Attending: EMERGENCY MEDICINE
Payer: MEDICARE

## 2021-12-02 VITALS
WEIGHT: 240 LBS | BODY MASS INDEX: 32.51 KG/M2 | HEIGHT: 72 IN | DIASTOLIC BLOOD PRESSURE: 73 MMHG | SYSTOLIC BLOOD PRESSURE: 122 MMHG | OXYGEN SATURATION: 97 % | TEMPERATURE: 97.7 F | HEART RATE: 54 BPM | RESPIRATION RATE: 18 BRPM

## 2021-12-02 DIAGNOSIS — R07.9 ACUTE CHEST PAIN: Primary | ICD-10-CM

## 2021-12-02 DIAGNOSIS — I25.110 CORONARY ARTERY DISEASE INVOLVING NATIVE HEART WITH UNSTABLE ANGINA PECTORIS, UNSPECIFIED VESSEL OR LESION TYPE (HCC): ICD-10-CM

## 2021-12-02 DIAGNOSIS — E78.5 HYPERLIPIDEMIA, UNSPECIFIED HYPERLIPIDEMIA TYPE: Chronic | ICD-10-CM

## 2021-12-02 DIAGNOSIS — I20.0 UNSTABLE ANGINA (HCC): ICD-10-CM

## 2021-12-02 DIAGNOSIS — I10 PRIMARY HYPERTENSION: Chronic | ICD-10-CM

## 2021-12-02 DIAGNOSIS — I25.10 CORONARY ARTERY DISEASE INVOLVING NATIVE CORONARY ARTERY OF NATIVE HEART WITHOUT ANGINA PECTORIS: Chronic | ICD-10-CM

## 2021-12-02 PROBLEM — N20.1 LEFT URETERAL STONE: Status: RESOLVED | Noted: 2018-08-08 | Resolved: 2021-12-02

## 2021-12-02 PROBLEM — I21.4 NSTEMI (NON-ST ELEVATED MYOCARDIAL INFARCTION) (HCC): Status: RESOLVED | Noted: 2020-11-12 | Resolved: 2021-12-02

## 2021-12-02 LAB
ALBUMIN SERPL-MCNC: 3.3 G/DL (ref 3.2–4.6)
ALBUMIN/GLOB SERPL: 1 {RATIO} (ref 1.2–3.5)
ALP SERPL-CCNC: 92 U/L (ref 50–136)
ALT SERPL-CCNC: 27 U/L (ref 12–65)
ANION GAP SERPL CALC-SCNC: 6 MMOL/L (ref 7–16)
AST SERPL-CCNC: 20 U/L (ref 15–37)
ATRIAL RATE: 63 BPM
BASOPHILS # BLD: 0.1 K/UL (ref 0–0.2)
BASOPHILS NFR BLD: 1 % (ref 0–2)
BILIRUB SERPL-MCNC: 0.4 MG/DL (ref 0.2–1.1)
BUN SERPL-MCNC: 18 MG/DL (ref 8–23)
CALCIUM SERPL-MCNC: 9.1 MG/DL (ref 8.3–10.4)
CALCULATED P AXIS, ECG09: -2 DEGREES
CALCULATED R AXIS, ECG10: 74 DEGREES
CALCULATED T AXIS, ECG11: -85 DEGREES
CHLORIDE SERPL-SCNC: 113 MMOL/L (ref 98–107)
CO2 SERPL-SCNC: 24 MMOL/L (ref 21–32)
CREAT SERPL-MCNC: 1.44 MG/DL (ref 0.8–1.5)
DIAGNOSIS, 93000: NORMAL
DIFFERENTIAL METHOD BLD: ABNORMAL
EOSINOPHIL # BLD: 0.3 K/UL (ref 0–0.8)
EOSINOPHIL NFR BLD: 3 % (ref 0.5–7.8)
ERYTHROCYTE [DISTWIDTH] IN BLOOD BY AUTOMATED COUNT: 12.4 % (ref 11.9–14.6)
GLOBULIN SER CALC-MCNC: 3.4 G/DL (ref 2.3–3.5)
GLUCOSE SERPL-MCNC: 167 MG/DL (ref 65–100)
HCT VFR BLD AUTO: 46.2 % (ref 41.1–50.3)
HGB BLD-MCNC: 15.3 G/DL (ref 13.6–17.2)
IMM GRANULOCYTES # BLD AUTO: 0.1 K/UL (ref 0–0.5)
IMM GRANULOCYTES NFR BLD AUTO: 1 % (ref 0–5)
LIPASE SERPL-CCNC: 208 U/L (ref 73–393)
LYMPHOCYTES # BLD: 2 K/UL (ref 0.5–4.6)
LYMPHOCYTES NFR BLD: 21 % (ref 13–44)
MAGNESIUM SERPL-MCNC: 1.9 MG/DL (ref 1.8–2.4)
MCH RBC QN AUTO: 29.3 PG (ref 26.1–32.9)
MCHC RBC AUTO-ENTMCNC: 33.1 G/DL (ref 31.4–35)
MCV RBC AUTO: 88.5 FL (ref 79.6–97.8)
MONOCYTES # BLD: 0.6 K/UL (ref 0.1–1.3)
MONOCYTES NFR BLD: 6 % (ref 4–12)
NEUTS SEG # BLD: 6.7 K/UL (ref 1.7–8.2)
NEUTS SEG NFR BLD: 68 % (ref 43–78)
NRBC # BLD: 0 K/UL (ref 0–0.2)
P-R INTERVAL, ECG05: 180 MS
PLATELET # BLD AUTO: 226 K/UL (ref 150–450)
PMV BLD AUTO: 9.3 FL (ref 9.4–12.3)
POTASSIUM SERPL-SCNC: 4.4 MMOL/L (ref 3.5–5.1)
PROT SERPL-MCNC: 6.7 G/DL (ref 6.3–8.2)
Q-T INTERVAL, ECG07: 412 MS
QRS DURATION, ECG06: 104 MS
QTC CALCULATION (BEZET), ECG08: 422 MS
RBC # BLD AUTO: 5.22 M/UL (ref 4.23–5.6)
SODIUM SERPL-SCNC: 143 MMOL/L (ref 136–145)
TROPONIN-HIGH SENSITIVITY: 69.1 PG/ML (ref 0–14)
VENTRICULAR RATE, ECG03: 63 BPM
WBC # BLD AUTO: 9.9 K/UL (ref 4.3–11.1)

## 2021-12-02 PROCEDURE — 74011000258 HC RX REV CODE- 258: Performed by: INTERNAL MEDICINE

## 2021-12-02 PROCEDURE — 99285 EMERGENCY DEPT VISIT HI MDM: CPT

## 2021-12-02 PROCEDURE — 80053 COMPREHEN METABOLIC PANEL: CPT

## 2021-12-02 PROCEDURE — 93459 L HRT ART/GRFT ANGIO: CPT | Performed by: INTERNAL MEDICINE

## 2021-12-02 PROCEDURE — G0378 HOSPITAL OBSERVATION PER HR: HCPCS

## 2021-12-02 PROCEDURE — 83735 ASSAY OF MAGNESIUM: CPT

## 2021-12-02 PROCEDURE — 93005 ELECTROCARDIOGRAM TRACING: CPT

## 2021-12-02 PROCEDURE — 99284 EMERGENCY DEPT VISIT MOD MDM: CPT

## 2021-12-02 PROCEDURE — 84484 ASSAY OF TROPONIN QUANT: CPT

## 2021-12-02 PROCEDURE — C1894 INTRO/SHEATH, NON-LASER: HCPCS | Performed by: INTERNAL MEDICINE

## 2021-12-02 PROCEDURE — 2709999900 HC NON-CHARGEABLE SUPPLY

## 2021-12-02 PROCEDURE — 77030016699 HC CATH ANGI DX INFN1 CARD -A: Performed by: INTERNAL MEDICINE

## 2021-12-02 PROCEDURE — 74011250636 HC RX REV CODE- 250/636: Performed by: NURSE PRACTITIONER

## 2021-12-02 PROCEDURE — 99220 PR INITIAL OBSERVATION CARE/DAY 70 MINUTES: CPT | Performed by: INTERNAL MEDICINE

## 2021-12-02 PROCEDURE — 77030042317 HC BND COMPR HEMSTAT -B: Performed by: INTERNAL MEDICINE

## 2021-12-02 PROCEDURE — 99152 MOD SED SAME PHYS/QHP 5/>YRS: CPT | Performed by: INTERNAL MEDICINE

## 2021-12-02 PROCEDURE — 96376 TX/PRO/DX INJ SAME DRUG ADON: CPT

## 2021-12-02 PROCEDURE — 74011250637 HC RX REV CODE- 250/637: Performed by: INTERNAL MEDICINE

## 2021-12-02 PROCEDURE — 96374 THER/PROPH/DIAG INJ IV PUSH: CPT

## 2021-12-02 PROCEDURE — 71045 X-RAY EXAM CHEST 1 VIEW: CPT

## 2021-12-02 PROCEDURE — 74011000636 HC RX REV CODE- 636: Performed by: INTERNAL MEDICINE

## 2021-12-02 PROCEDURE — C1769 GUIDE WIRE: HCPCS | Performed by: INTERNAL MEDICINE

## 2021-12-02 PROCEDURE — 85025 COMPLETE CBC W/AUTO DIFF WBC: CPT

## 2021-12-02 PROCEDURE — 74011250636 HC RX REV CODE- 250/636: Performed by: INTERNAL MEDICINE

## 2021-12-02 PROCEDURE — 74011000250 HC RX REV CODE- 250: Performed by: INTERNAL MEDICINE

## 2021-12-02 PROCEDURE — 99153 MOD SED SAME PHYS/QHP EA: CPT | Performed by: INTERNAL MEDICINE

## 2021-12-02 PROCEDURE — 74011250637 HC RX REV CODE- 250/637: Performed by: NURSE PRACTITIONER

## 2021-12-02 PROCEDURE — 83690 ASSAY OF LIPASE: CPT

## 2021-12-02 RX ORDER — LISINOPRIL 5 MG/1
10 TABLET ORAL DAILY
Status: DISCONTINUED | OUTPATIENT
Start: 2021-12-02 | End: 2021-12-02 | Stop reason: HOSPADM

## 2021-12-02 RX ORDER — HEPARIN SODIUM 5000 [USP'U]/100ML
12-25 INJECTION, SOLUTION INTRAVENOUS
Status: DISCONTINUED | OUTPATIENT
Start: 2021-12-02 | End: 2021-12-02 | Stop reason: HOSPADM

## 2021-12-02 RX ORDER — ATORVASTATIN CALCIUM 40 MG/1
40 TABLET, FILM COATED ORAL
Status: DISCONTINUED | OUTPATIENT
Start: 2021-12-02 | End: 2021-12-02 | Stop reason: HOSPADM

## 2021-12-02 RX ORDER — HYDROCODONE BITARTRATE AND ACETAMINOPHEN 10; 325 MG/1; MG/1
1 TABLET ORAL
Status: DISCONTINUED | OUTPATIENT
Start: 2021-12-02 | End: 2021-12-02 | Stop reason: HOSPADM

## 2021-12-02 RX ORDER — SODIUM CHLORIDE 0.9 % (FLUSH) 0.9 %
5-40 SYRINGE (ML) INJECTION AS NEEDED
Status: DISCONTINUED | OUTPATIENT
Start: 2021-12-02 | End: 2021-12-02 | Stop reason: HOSPADM

## 2021-12-02 RX ORDER — SODIUM CHLORIDE 0.9 % (FLUSH) 0.9 %
5-10 SYRINGE (ML) INJECTION EVERY 8 HOURS
Status: DISCONTINUED | OUTPATIENT
Start: 2021-12-02 | End: 2021-12-02 | Stop reason: HOSPADM

## 2021-12-02 RX ORDER — SODIUM CHLORIDE 0.9 % (FLUSH) 0.9 %
5-40 SYRINGE (ML) INJECTION EVERY 8 HOURS
Status: DISCONTINUED | OUTPATIENT
Start: 2021-12-02 | End: 2021-12-02 | Stop reason: HOSPADM

## 2021-12-02 RX ORDER — ONDANSETRON 2 MG/ML
4 INJECTION INTRAMUSCULAR; INTRAVENOUS
Status: DISCONTINUED | OUTPATIENT
Start: 2021-12-02 | End: 2021-12-02 | Stop reason: HOSPADM

## 2021-12-02 RX ORDER — SODIUM CHLORIDE 9 MG/ML
75 INJECTION, SOLUTION INTRAVENOUS CONTINUOUS
Status: DISCONTINUED | OUTPATIENT
Start: 2021-12-02 | End: 2021-12-02 | Stop reason: HOSPADM

## 2021-12-02 RX ORDER — NITROGLYCERIN 0.4 MG/1
0.4 TABLET SUBLINGUAL
Status: DISCONTINUED | OUTPATIENT
Start: 2021-12-02 | End: 2021-12-02 | Stop reason: HOSPADM

## 2021-12-02 RX ORDER — ACETAMINOPHEN 325 MG/1
650 TABLET ORAL
Status: DISCONTINUED | OUTPATIENT
Start: 2021-12-02 | End: 2021-12-02 | Stop reason: HOSPADM

## 2021-12-02 RX ORDER — SODIUM CHLORIDE 0.9 % (FLUSH) 0.9 %
5-10 SYRINGE (ML) INJECTION AS NEEDED
Status: DISCONTINUED | OUTPATIENT
Start: 2021-12-02 | End: 2021-12-02 | Stop reason: HOSPADM

## 2021-12-02 RX ORDER — GUAIFENESIN 100 MG/5ML
81 LIQUID (ML) ORAL
Status: COMPLETED | OUTPATIENT
Start: 2021-12-02 | End: 2021-12-02

## 2021-12-02 RX ORDER — SODIUM CHLORIDE 9 MG/ML
50 INJECTION, SOLUTION INTRAVENOUS CONTINUOUS
Status: DISCONTINUED | OUTPATIENT
Start: 2021-12-02 | End: 2021-12-02 | Stop reason: HOSPADM

## 2021-12-02 RX ORDER — HEPARIN SODIUM 200 [USP'U]/100ML
INJECTION, SOLUTION INTRAVENOUS
Status: COMPLETED | OUTPATIENT
Start: 2021-12-02 | End: 2021-12-02

## 2021-12-02 RX ORDER — CLOPIDOGREL BISULFATE 75 MG/1
75 TABLET ORAL DAILY
Status: DISCONTINUED | OUTPATIENT
Start: 2021-12-02 | End: 2021-12-02 | Stop reason: HOSPADM

## 2021-12-02 RX ORDER — MORPHINE SULFATE 2 MG/ML
2 INJECTION, SOLUTION INTRAMUSCULAR; INTRAVENOUS
Status: DISCONTINUED | OUTPATIENT
Start: 2021-12-02 | End: 2021-12-02 | Stop reason: HOSPADM

## 2021-12-02 RX ORDER — LIDOCAINE HYDROCHLORIDE 10 MG/ML
INJECTION INFILTRATION; PERINEURAL AS NEEDED
Status: DISCONTINUED | OUTPATIENT
Start: 2021-12-02 | End: 2021-12-02 | Stop reason: HOSPADM

## 2021-12-02 RX ORDER — HEPARIN SODIUM 1000 [USP'U]/ML
60 INJECTION, SOLUTION INTRAVENOUS; SUBCUTANEOUS ONCE
Status: COMPLETED | OUTPATIENT
Start: 2021-12-02 | End: 2021-12-02

## 2021-12-02 RX ORDER — FENOFIBRATE 160 MG/1
160 TABLET ORAL DAILY
Status: DISCONTINUED | OUTPATIENT
Start: 2021-12-02 | End: 2021-12-02 | Stop reason: HOSPADM

## 2021-12-02 RX ORDER — ASPIRIN 81 MG/1
81 TABLET ORAL DAILY
Status: DISCONTINUED | OUTPATIENT
Start: 2021-12-03 | End: 2021-12-02 | Stop reason: HOSPADM

## 2021-12-02 RX ORDER — DIAZEPAM 5 MG/1
5 TABLET ORAL
Status: DISCONTINUED | OUTPATIENT
Start: 2021-12-02 | End: 2021-12-02 | Stop reason: HOSPADM

## 2021-12-02 RX ORDER — CARVEDILOL 6.25 MG/1
6.25 TABLET ORAL 2 TIMES DAILY WITH MEALS
Status: DISCONTINUED | OUTPATIENT
Start: 2021-12-02 | End: 2021-12-02 | Stop reason: HOSPADM

## 2021-12-02 RX ORDER — MIDAZOLAM HYDROCHLORIDE 1 MG/ML
INJECTION, SOLUTION INTRAMUSCULAR; INTRAVENOUS AS NEEDED
Status: DISCONTINUED | OUTPATIENT
Start: 2021-12-02 | End: 2021-12-02 | Stop reason: HOSPADM

## 2021-12-02 RX ORDER — FENTANYL CITRATE 50 UG/ML
INJECTION, SOLUTION INTRAMUSCULAR; INTRAVENOUS AS NEEDED
Status: DISCONTINUED | OUTPATIENT
Start: 2021-12-02 | End: 2021-12-02 | Stop reason: HOSPADM

## 2021-12-02 RX ORDER — PANTOPRAZOLE SODIUM 40 MG/1
40 TABLET, DELAYED RELEASE ORAL
Status: DISCONTINUED | OUTPATIENT
Start: 2021-12-02 | End: 2021-12-02 | Stop reason: HOSPADM

## 2021-12-02 RX ADMIN — PANTOPRAZOLE SODIUM 40 MG: 40 TABLET, DELAYED RELEASE ORAL at 08:21

## 2021-12-02 RX ADMIN — SODIUM CHLORIDE 75 ML: 900 INJECTION, SOLUTION INTRAVENOUS at 11:16

## 2021-12-02 RX ADMIN — CLOPIDOGREL BISULFATE 75 MG: 75 TABLET ORAL at 08:22

## 2021-12-02 RX ADMIN — HEPARIN SODIUM 6480 UNITS: 1000 INJECTION INTRAVENOUS; SUBCUTANEOUS at 05:55

## 2021-12-02 RX ADMIN — DIAZEPAM 5 MG: 5 TABLET ORAL at 08:22

## 2021-12-02 RX ADMIN — SODIUM CHLORIDE 50 ML/HR: 900 INJECTION, SOLUTION INTRAVENOUS at 05:23

## 2021-12-02 RX ADMIN — FENOFIBRATE 160 MG: 160 TABLET ORAL at 08:22

## 2021-12-02 RX ADMIN — ASPIRIN 81 MG: 81 TABLET, CHEWABLE ORAL at 08:21

## 2021-12-02 RX ADMIN — Medication 10 ML: at 08:22

## 2021-12-02 RX ADMIN — HYDROCODONE BITARTRATE AND ACETAMINOPHEN 1 TABLET: 10; 325 TABLET ORAL at 08:22

## 2021-12-02 RX ADMIN — HEPARIN SODIUM 12 UNITS/KG/HR: 5000 INJECTION, SOLUTION INTRAVENOUS at 06:02

## 2021-12-02 RX ADMIN — CARVEDILOL 6.25 MG: 6.25 TABLET, FILM COATED ORAL at 08:22

## 2021-12-02 NOTE — DISCHARGE SUMMARY
[address] Cardiology Discharge Summary     Patient ID:  Taylor Mcgee  [id number]  89 y.o.  1955    Admit date: 12/2/2021    Discharge date:  12/02/21     Admitting Physician: Jose Haque MD     Discharge Physician: Sunita Piper NP/Dr. Naif Martinez    Admission Diagnoses: Unstable angina [facility]) [I20.0]    Discharge Diagnoses:   Patient Active Problem List    Diagnosis Date Noted    Unstable angina [facility]) 12/02/2021    S/P CABG (coronary artery bypass graft) 04/26/2016    CVA Personal Hx of TIA\T\CVA w/o residual deficit 04/26/2016    Claudication, Symptom, pain relieved by rest 04/26/2016    Tobacco abuse 04/23/2014    S/P AAA (abdominal aortic aneurysm) repair 12/11/2013    PVD (peripheral vascular disease) ([facility] 11/03/2011    COPD (chronic obstructive pulmonary disease) ([facility] 11/03/2011    Hypertension 11/03/2011    Hyperlipidemia 11/03/2011    GERD (gastroesophageal reflux disease) 11/03/2011    Anxiety 11/03/2011    Arthritis 11/03/2011    Anticoagulation monitoring, INR range 2-3 10/05/2011    Extremity atherosclerosis with resting pain ([address] 09/23/2011    AAA (abdominal aortic aneurysm) ([facility] 09/23/2011    PUD (peptic ulcer disease) 09/23/2011    Weight loss, unintentional 09/23/2011    Popliteal artery aneurysm, bilateral History 09/23/2011    CAD (coronary artery disease), native coronary artery 02/24/2010    Chronic systolic heart failure ([facility] 02/24/2010       Cardiology Procedures this admission:  Diagnostic left heart catheterization  EchoCardiogram  Consults: None    Hospital Course: Patient with past medial history of CAD with multiple stents and CABG with redo, HTN, HLD, tobacco abuse, and COPD who presented to the ER via EMS with complaints of chest pain. Patient states that his pain started at 1am. Describes his pain as sharp/stabbing pain located in the center of his chest with radiation to bilateral arms with hand numbness.  Denies shortness of breath, nausea, vomiting or diaphoresis. At home, he reports taking 81mg ASA x3 and SL nitro x 3. When pain was not relieved, he called EMS and was instructed to take an additional 81mg ASA. Upon EMS arrival, patient was pain free. Reports that his pain lasted until 2am. Upon arrival to the ER, EKG shows SR without acute ST/T wave changes. Initial hs-troponin is 69.1. He has remained pain free while in the ER tonight. Patient was admitted to tele floor for [place] and placed on hep gtt for planned LHC. Patient underwent cardiac catheterization by Dr. Seth Smith. Patient was found to have severe native three-vessel coronary artery disease. 3 of 4 patent bypass grafts with a patent LIMA to the LAD diagonal branch and a patent vein graft to the distal obtuse marginal branch. The vein graft to the RCA is occluded. Patient tolerated the procedure well and was taken to the telemetry floor for recovery. The afternoon post procedure, the patient was up feeling well without any complaints of chest pain or shortness of breath. Patient's left radial cath site was clean, dry and intact without hematoma or bruit. Patient's labs were WNL. Patient was seen and examined by Dr. Seth Smith and determined stable and ready for discharge. Patient was instructed on the importance of medication compliance and follow up appointments. For maximized medical therapy for CAD, patient will continue ASA, plavix, BB, ACE-I, and high intensity statin as well. The patient will follow up with [facility] Dr. Seth Smith on 12/16/2021 at 1:45 pm in [place] office. DISPOSITION: The patient is being discharged home in stable condition on a low saturated fat, low cholesterol and low salt diet. The patient is instructed to advance activities as tolerated to the limit of fatigue or shortness of breath. The patient is instructed to avoid all heavy lifting for 5 days.  The patient is instructed to watch the cath site for bleeding/oozing; if seen, the patient is instructed to apply firm pressure with a clean cloth and call [facility] at [phone]. The patient is instructed to watch for signs of infection which include: increasing area of redness, fever/hot to touch or purulent drainage at the catheterization site. The patient is instructed not to soak in a bathtub for 7-10 days, but is cleared to shower. The patient is instructed to call the office or return to the ER for immediate evaluation for any shortness of breath or chest pain not relieved by NTG. Discharge Exam:   Visit Vitals  /73 (BP 1 Location: Right upper arm, BP Patient Position: At rest)   Pulse (!) 54 Comment: RN notified   Temp 97.7 °F (36.5 °C)   Resp 18   Ht 6' (1.829 m)   Wt 108.9 kg (240 lb)   SpO2 97%   BMI 32.55 kg/m²     Patient has been seen by Dr. Sandy Hein: see his progress note for exam details.     Recent Results (from the past 24 hour(s))   EKG, 12 LEAD, INITIAL    Collection Time: 12/02/21  3:24 AM   Result Value Ref Range    Ventricular Rate 63 BPM    Atrial Rate 63 BPM    P-R Interval 180 ms    QRS Duration 104 ms    Q-T Interval 412 ms    QTC Calculation (Bezet) 422 ms    Calculated P Axis -2 degrees    Calculated R Axis 74 degrees    Calculated T Axis -85 degrees    Diagnosis       Sinus rhythm  Ventricular premature complex  Inferior infarct, age indeterminate    Confirmed by Cassidy Dior MD (), TEODORO GALAVIZ ([id number]) on 12/2/2021 6:58:19 AM     TROPONIN-HIGH SENSITIVITY    Collection Time: 12/02/21  3:30 AM   Result Value Ref Range    Troponin-High Sensitivity 69.1 (H) 0 - 14 pg/mL   CBC WITH AUTOMATED DIFF    Collection Time: 12/02/21  3:30 AM   Result Value Ref Range    WBC 9.9 4.3 - 11.1 K/uL    RBC 5.22 4.23 - 5.6 M/uL    HGB 15.3 13.6 - 17.2 g/dL    HCT 46.2 41.1 - 50.3 %    MCV 88.5 79.6 - 97.8 FL    MCH 29.3 26.1 - 32.9 PG    MCHC 33.1 31.4 - 35.0 g/dL    RDW 12.4 11.9 - 14.6 %    PLATELET 663 653 - 866 K/uL    MPV 9.3 (L) 9.4 - 12.3 FL    ABSOLUTE NRBC 0.00 0.0 - 0.2 K/uL    DF AUTOMATED      NEUTROPHILS 68 43 - 78 %    LYMPHOCYTES 21 13 - 44 %    MONOCYTES 6 4.0 - 12.0 %    EOSINOPHILS 3 0.5 - 7.8 %    BASOPHILS 1 0.0 - 2.0 %    IMMATURE GRANULOCYTES 1 0.0 - 5.0 %    ABS. NEUTROPHILS 6.7 1.7 - 8.2 K/UL    ABS. LYMPHOCYTES 2.0 0.5 - 4.6 K/UL    ABS. MONOCYTES 0.6 0.1 - 1.3 K/UL    ABS. EOSINOPHILS 0.3 0.0 - 0.8 K/UL    ABS. BASOPHILS 0.1 0.0 - 0.2 K/UL    ABS. IMM. GRANS. 0.1 0.0 - 0.5 K/UL   METABOLIC PANEL, COMPREHENSIVE    Collection Time: 12/02/21  3:30 AM   Result Value Ref Range    Sodium 143 136 - 145 mmol/L    Potassium 4.4 3.5 - 5.1 mmol/L    Chloride 113 (H) 98 - 107 mmol/L    CO2 24 21 - 32 mmol/L    Anion gap 6 (L) 7 - 16 mmol/L    Glucose 167 (H) 65 - 100 mg/dL    BUN 18 8 - 23 MG/DL    Creatinine 1.44 0.8 - 1.5 MG/DL    GFR est AA >60 >60 ml/min/1.73m2    GFR est non-AA 52 (L) >60 ml/min/1.73m2    Calcium 9.1 8.3 - 10.4 MG/DL    Bilirubin, total 0.4 0.2 - 1.1 MG/DL    ALT (SGPT) 27 12 - 65 U/L    AST (SGOT) 20 15 - 37 U/L    Alk. phosphatase 92 50 - 136 U/L    Protein, total 6.7 6.3 - 8.2 g/dL    Albumin 3.3 3.2 - 4.6 g/dL    Globulin 3.4 2.3 - 3.5 g/dL    A-G Ratio 1.0 (L) 1.2 - 3.5     LIPASE    Collection Time: 12/02/21  3:30 AM   Result Value Ref Range    Lipase 208 73 - 393 U/L   MAGNESIUM    Collection Time: 12/02/21  3:30 AM   Result Value Ref Range    Magnesium 1.9 1.8 - 2.4 mg/dL         Patient Instructions:   Current Discharge Medication List      CONTINUE these medications which have NOT CHANGED    Details   carvediloL (COREG) 6.25 mg tablet Take 1 Tablet by mouth two (2) times daily (with meals). Qty: 60 Tablet, Refills: 5      fenofibrate (LOFIBRA) 160 mg tablet TAKE 1 TABLET BY MOUTH ONCE DAILY  Qty: 30 Tablet, Refills: 6      atorvastatin (LIPITOR) 40 mg tablet Take  by mouth daily. clopidogrel (PLAVIX) 75 mg tablet Take 1 Tab by mouth daily.   Qty: 30 Tab, Refills: 11      HYDROcodone-acetaminophen (NORCO)  mg tablet Take 1 Tab by mouth every four (4) hours as needed. Max Daily Amount: 6 Tabs. Qty: 20 Tab, Refills: 0      acetaminophen (TYLENOL) 500 mg tablet Take  by mouth every six (6) hours as needed for Pain. NITROSTAT 0.4 mg SL tablet       omeprazole (PRILOSEC) 40 mg capsule Take 40 mg by mouth daily. aspirin delayed-release 81 mg tablet Take  by mouth daily. diazepam (VALIUM) 5 mg tablet Take 5 mg by mouth every twelve (12) hours as needed. lisinopriL (PRINIVIL, ZESTRIL) 20 mg tablet Take 0.5 Tablets by mouth daily. Qty: 90 Tablet, Refills: 1      diphenhydrAMINE (ALLERGY) 25 mg capsule Take 25 mg by mouth every six (6) hours as needed.                Signed:  KIANA Albert  12/2/2021  1:41 PM

## 2021-12-02 NOTE — Clinical Note
TRANSFER - OUT REPORT:     Verbal report given to: 58 RN. Report consisted of patient's Situation, Background, Assessment and   Recommendations(SBAR). Opportunity for questions and clarification was provided. Patient transported with a Registered Nurse.

## 2021-12-02 NOTE — DISCHARGE INSTRUCTIONS
Patient Education        Angiogram: What to Expect at Home  Your Recovery  An angiogram is an X-ray test that uses dye and a camera to take pictures of the blood flow in an artery or a vein. The doctor inserted a thin, flexible tube (catheter) into a blood vessel in your groin. In some cases, the catheter is placed in a blood vessel in the arm. An angiogram is done for many reasons. For example, you may have an angiogram to find the source of bleeding, such as an ulcer. Or it may be done to look for blocked blood vessels in your lungs. After an angiogram, your groin or arm may have a bruise and feel sore for a day or two. You can do light activities around the house but nothing strenuous for several days. Your doctor may give you specific instructions on when you can do your normal activities again, such as driving and going back to work. This care sheet gives you a general idea about how long it will take for you to recover. But each person recovers at a different pace. Follow the steps below to feel better as quickly as possible. How can you care for yourself at home? Activity    · Do not do strenuous exercise and do not lift, pull, or push anything heavy until your doctor says it is okay. This may be for a day or two. You can walk around the house and do light activity, such as cooking.     · If the catheter was placed in your groin, try not to walk up stairs for the first couple of days.     · If the catheter was placed in your arm near your wrist, do not bend your wrist deeply for the first couple of days. Be careful using your hand to get into and out of a chair or bed.     · If your doctor recommends it, get more exercise. Walking is a good choice. Bit by bit, increase the amount you walk every day. Try for at least 30 minutes on most days of the week. Diet    · Drink plenty of fluids to help your body flush out the dye.  If you have kidney, heart, or liver disease and have to limit fluids, talk with your doctor before you increase the amount of fluids you drink.     · You can eat your normal diet. If your stomach is upset, try bland, low-fat foods like plain rice, broiled chicken, toast, and yogurt. Medicines    · Be safe with medicines. Read and follow all instructions on the label. ? If the doctor gave you a prescription medicine for pain, take it as prescribed. ? If you are not taking a prescription pain medicine, ask your doctor if you can take an over-the-counter medicine.     · If you take aspirin or some other blood thinner, ask your doctor if and when to start taking it again. Make sure that you understand exactly what your doctor wants you to do.     · Your doctor will tell you if and when you can restart your medicines. He or she will also give you instructions about taking any new medicines. Care of the catheter site    · You will have a dressing over the cut (incision). A dressing helps the incision heal and protects it. Your doctor will tell you how to take care of this.     · Put ice or a cold pack on the area for 10 to 20 minutes at a time to help with soreness or swelling. Put a thin cloth between the ice and your skin.     · You may shower 24 to 48 hours after the procedure, if your doctor okays it. Pat the incision dry.     · Do not soak the catheter site until it is healed. Don't take a bath for 1 week, or until your doctor tells you it is okay.     · Watch for bleeding from the site. A small amount of blood (up to the size of a quarter) on the bandage can be normal.     · If you are bleeding, lie down and press on the area for 15 minutes to try to make it stop. If the bleeding does not stop, call your doctor or seek immediate medical care. Follow-up care is a key part of your treatment and safety. Be sure to make and go to all appointments, and call your doctor if you are having problems. It's also a good idea to know your test results and keep a list of the medicines you take.   When should you call for help? Call 911 anytime you think you may need emergency care. For example, call if:    · You passed out (lost consciousness).     · You have severe trouble breathing.     · You have sudden chest pain and shortness of breath, or you cough up blood. Call your doctor now or seek immediate medical care if:    · You are bleeding from the area where the catheter was put in your artery.     · You have a fast-growing, painful lump at the catheter site.     · You have signs of infection, such as:  ? Increased pain, swelling, warmth, or redness. ? Red streaks leading from the incision. ? Pus draining from the incision. ? A fever. Watch closely for any changes in your health, and be sure to contact your doctor if:    · You don't get better as expected. Where can you learn more? Go to http://www.gray.com/  Enter [id number] in the search box to learn more about \"Angiogram: What to Expect at Home. \"  Current as of: June 17, 2021               Content Version: 13.0  © 2006-2021 [employer]. Care instructions adapted under license by [employer] (which disclaims liability or warranty for this information). If you have questions about a medical condition or this instruction, always ask your healthcare professional. [address] any warranty or liability for your use of this information.

## 2021-12-02 NOTE — ROUTINE PROCESS
Radial compression band removed at 1245 after slowly reducing air from 14 cc to zero as per hospital protocol. No bleeding or hematoma noted. 2 x 2 gauze with tegaderm placed over puncture site. The affected extremity is warm and dry to the touch. Frequent vital signs printed and placed on bedside chart. Patient instructed to call if any bleeding noted on gauze. Patient verbalized understanding the nursing instructions.

## 2021-12-02 NOTE — ED PROVIDER NOTES
Patient at 1:00 this morning was walking around his house to lockup when he began experiencing substernal chest pain that was sharp in nature. There were no associated symptoms. No other aggravating or alleviating factors. Patient took 3 nitroglycerin without relief and was instructed by 911 to take an additional 1. This caused him to have a headache but did not improve his pain. He also took aspirin. Pain resolved after 1 hour upon EMS arrival.    The history is provided by the patient. Chest Pain (Angina)   This is a recurrent problem. Episode onset: 0100. The problem has been resolved. Duration of episode(s) is 1 hour. The problem occurs constantly. Associated with: walking around house. The pain is present in the substernal region. The patient is experiencing no pain. The quality of the pain is described as sharp. Radiates to: bilateral arms. The symptoms are aggravated by exertion. Associated symptoms include lower extremity edema ( unchanged). Pertinent negatives include no abdominal pain, no back pain, no cough, no diaphoresis, no fever, no hemoptysis, no nausea, no shortness of breath and no vomiting. He has tried aspirin and nitroglycerin for the symptoms. The treatment provided no relief. Risk factors include cardiac disease, hypertension and smoking/tobacco exposure. His past medical history is significant for aneurysm, HTN and CHF. His past medical history does not include DVT or PE. Procedural history includes cardiac catheterization, cardiac stents, pacemaker and CABG.        Past Medical History:   Diagnosis Date    AAA (abdominal aortic aneurysm) ([facility] [address] 9/23/2011 9/29/11 AORTIC ABDOMINAL ANUERYSM REPAIR ENDOVASCULAR ([facility])-  Jayesh Underwood 4.6cm on US 9/23/11     Acute myocardial infarction [facility]) February, 2010    MI 2/10 - Quad bypass 2/25/10 ,4/2016    Anticoagulation monitoring, INR range 2-3 10/5/2011    Anxiety 11/3/2011    Arthritis 11/3/2011    CAD (coronary artery disease)     CAD (coronary artery disease), native coronary artery 2/24/2010    Cardiomyopathy ([address] 2/24/2010    Chest discomfort 06/16/15    Tightness, Pressure. Jan 2014:  stress MPI with Lexiscan - normal perfusion, LVEF 52%    Chronic systolic heart failure ([address] 2/24/2010    Claudication ([address] 06/16/15    Claudication, symptom, pain relieved by rest    Congestive heart failure, unspecified     COPD (chronic obstructive pulmonary disease) ([address] 11/3/2011    Extremity atherosclerosis with resting pain ([address] 9/23/2011 9/28/11 ; [facility]- Dr. Smiley Dunlap GERD (gastroesophageal reflux disease) 11/3/2011    History of AAA (abdominal aortic aneurysm) repair ? ??    including right femoral artery    Hypercholesterolemia     Hyperlipidemia 11/3/2011    Hypertension 11/3/2011    Popliteal artery aneurysm, bilateral History 9/23/2011    L Pop A aneurysm stent graft 9/8/11- Dr. Matheus Ortiz Pop A aneurysm repair      Psychiatric disorder     anxiety    PUD (peptic ulcer disease) ? ??    hx bleeding gastric ulcers     PVD (peripheral vascular disease) ([address] 11/3/2011    S/P AAA (abdominal aortic aneurysm) repair 12/11/2013    [facility]    S/P CABG (coronary artery bypass graft) 4/26/2016    Tobacco abuse 4/23/2014    Weight loss, unintentional 9/23/2011       Past Surgical History:   Procedure Laterality Date    HX AAA REPAIR      10/2011    HX CHOLECYSTECTOMY  2012    HX FEMORAL BYPASS      Left bypass stent graft    DC BYPASS GRAFT OTHR,FEM-POP  03/18/2011    right leg    DC BYPASS GRAFT OTHR,FEM-TIBIAL  2012    LLE bypass pop aneurysm    DC CABG, ARTERY-VEIN, FIVE      2010    DC CABG, ARTERY-VEIN, FOUR  2010    DC LAP,CHOLECYSTECTOMY      09/06/11         Family History:   Problem Relation Age of Onset    Heart Disease Mother     Hypertension Mother     Cancer Maternal Grandmother     Heart Disease Maternal Grandfather     Hypertension Maternal Uncle     Diabetes Maternal Uncle     Diabetes Brother     Cancer Paternal Uncle        Social History     Socioeconomic History    Marital status:      Spouse name: Not on file    Number of children: Not on file    Years of education: Not on file    Highest education level: Not on file   Occupational History    Not on file   Tobacco Use    Smoking status: Current Every Day Smoker     Packs/day: 0.50     Years: 40.00     Pack years: 20.00     Types: Cigarettes    Smokeless tobacco: Never Used   Substance and Sexual Activity    Alcohol use: No    Drug use: Yes     Types: Marijuana, Prescription     Comment: very rare    Sexual activity: Not on file   Other Topics Concern    Not on file   Social History Narrative    Not on file     Social Determinants of Health     Financial Resource Strain:     Difficulty of Paying Living Expenses: Not on file   Food Insecurity:     Worried About Running Out of Food in the Last Year: Not on file    Yg of Food in the Last Year: Not on file   Transportation Needs:     Lack of Transportation (Medical): Not on file    Lack of Transportation (Non-Medical):  Not on file   Physical Activity:     Days of Exercise per Week: Not on file    Minutes of Exercise per Session: Not on file   Stress:     Feeling of Stress : Not on file   Social Connections:     Frequency of Communication with Friends and Family: Not on file    Frequency of Social Gatherings with Friends and Family: Not on file    Attends Oriental orthodox Services: Not on file    Active Member of Clubs or Organizations: Not on file    Attends Club or Organization Meetings: Not on file    Marital Status: Not on file   Intimate Partner Violence:     Fear of Current or Ex-Partner: Not on file    Emotionally Abused: Not on file    Physically Abused: Not on file    Sexually Abused: Not on file   Housing Stability:     Unable to Pay for Housing in the Last Year: Not on file    Number of [place] in the Last Year: Not on file    Unstable Housing in the Last Year: Not on file         ALLERGIES: Codeine and Venom-honey bee    Review of Systems   Constitutional: Negative for diaphoresis and fever. HENT: Negative for congestion and rhinorrhea. Respiratory: Negative for cough, hemoptysis and shortness of breath. Cardiovascular: Positive for chest pain and leg swelling. Gastrointestinal: Negative for abdominal pain, nausea and vomiting. Genitourinary: Negative for dysuria and hematuria. Musculoskeletal: Negative for back pain. All other systems reviewed and are negative. Vitals:    12/02/21 0325   BP: (!) 146/90   Pulse: 69   Resp: 18   Temp: 98.4 °F (36.9 °C)   SpO2: 97%   Weight: 108 kg (238 lb)   Height: 6' (1.829 m)            Physical Exam  Vitals and nursing note reviewed. Constitutional:       Appearance: He is well-developed. HENT:      Mouth/Throat:      Pharynx: No oropharyngeal exudate. Eyes:      Conjunctiva/sclera: Conjunctivae normal.      Pupils: Pupils are equal, round, and reactive to light. Cardiovascular:      Rate and Rhythm: Normal rate and regular rhythm. Pulses:           Carotid pulses are 2+ on the right side and 2+ on the left side. Radial pulses are 2+ on the right side and 2+ on the left side. Heart sounds: Normal heart sounds. Pulmonary:      Effort: Pulmonary effort is normal.      Breath sounds: Normal breath sounds. Abdominal:      General: Bowel sounds are normal. There is no distension. Palpations: Abdomen is soft. Tenderness: There is no abdominal tenderness. There is no guarding or rebound. Musculoskeletal:         General: No tenderness. Normal range of motion. Cervical back: Neck supple. Right lower leg: No tenderness. Edema ( Trace bilateral pitting edema) present. Left lower leg: No tenderness. Edema present. Lymphadenopathy:      Cervical: No cervical adenopathy.    Skin:     General: Skin is warm and dry. Neurological:      Mental Status: He is alert and oriented to person, place, and time. MDM  Number of Diagnoses or Management Options  Diagnosis management comments: Pain is sharp and atypical but with concerning  radiation into both arms. Carotid and radial arteries equal and mediastinum unchanged on chest x-ray so doubt aortic dissection. Initial troponin 69.1. EKG shows new asymmetric lateral T wave inversion. Cardiology consulted for further evaluation and disposition. Patient remains pain-free.        Amount and/or Complexity of Data Reviewed  Clinical lab tests: ordered and reviewed (Results for orders placed or performed during the hospital encounter of 12/02/21  -TROPONIN-HIGH SENSITIVITY:        Result                      Value             Ref Range           Troponin-High Sensitiv*     69.1 (H)          0 - 14 pg/mL   -CBC WITH AUTOMATED DIFF:        Result                      Value             Ref Range           WBC                         9.9               4.3 - 11.1 K*       RBC                         5.22              4.23 - 5.6 M*       HGB                         15.3              13.6 - 17.2 *       HCT                         46.2              41.1 - 50.3 %       MCV                         88.5              79.6 - 97.8 *       MCH                         29.3              26.1 - 32.9 *       MCHC                        33.1              31.4 - 35.0 *       RDW                         12.4              11.9 - 14.6 %       PLATELET                    226               150 - 450 K/*       MPV                         9.3 (L)           9.4 - 12.3 FL       ABSOLUTE NRBC               0.00              0.0 - 0.2 K/*       DF                          AUTOMATED                             NEUTROPHILS                 68                43 - 78 %           LYMPHOCYTES                 21                13 - 44 %           MONOCYTES                   6                 4.0 - 12.0 % EOSINOPHILS                 3                 0.5 - 7.8 %         BASOPHILS                   1                 0.0 - 2.0 %         IMMATURE GRANULOCYTES       1                 0.0 - 5.0 %         ABS. NEUTROPHILS            6.7               1.7 - 8.2 K/*       ABS. LYMPHOCYTES            2.0               0.5 - 4.6 K/*       ABS. MONOCYTES              0.6               0.1 - 1.3 K/*       ABS. EOSINOPHILS            0.3               0.0 - 0.8 K/*       ABS. BASOPHILS              0.1               0.0 - 0.2 K/*       ABS. IMM. GRANS.            0.1               0.0 - 0.5 K/*  -METABOLIC PANEL, COMPREHENSIVE:        Result                      Value             Ref Range           Sodium                      143               136 - 145 mm*       Potassium                   4.4               3.5 - 5.1 mm*       Chloride                    113 (H)           98 - 107 mmo*       CO2                         24                21 - 32 mmol*       Anion gap                   6 (L)             7 - 16 mmol/L       Glucose                     167 (H)           65 - 100 mg/*       BUN                         18                8 - 23 MG/DL        Creatinine                  1.44              0.8 - 1.5 MG*       GFR est AA                  >60               >60 ml/min/1*       GFR est non-AA              52 (L)            >60 ml/min/1*       Calcium                     9.1               8.3 - 10.4 M*       Bilirubin, total            0.4               0.2 - 1.1 MG*       ALT (SGPT)                  27                12 - 65 U/L         AST (SGOT)                  20                15 - 37 U/L         Alk.  phosphatase            92                50 - 136 U/L        Protein, total              6.7               6.3 - 8.2 g/*       Albumin                     3.3               3.2 - 4.6 g/*       Globulin                    3.4               2.3 - 3.5 g/*       A-G Ratio                   1.0 (L)           1.2 - 3.5 -LIPASE:        Result                      Value             Ref Range           Lipase                      208               73 - 393 U/L   -MAGNESIUM:        Result                      Value             Ref Range           Magnesium                   1.9               1.8 - 2.4 mg*  )  Tests in the radiology section of CPT®: ordered and reviewed (XR CHEST PORT    Result Date: 2021  EXAM: Chest x-ray. INDICATION: Chest pain. COMPARISON: Prior chest x-ray on 2020. TECHNIQUE: Single frontal view. FINDINGS: The lungs are clear. The cardiac size, mediastinal contour and pulmonary vasculature are normal. No pneumothorax or pleural effusion is seen. Again noted is a prior sternotomy and a left chest wall defibrillator. No acute process.    )  Tests in the medicine section of CPT®: reviewed and ordered  Review and summarize past medical records: yes (Procedure Note    [facility]  CARDIAC CATH     Name:  Flynn Akins  MR#:  [id number]  :  1955  ACCOUNT #:  [de-identified]  DATE OF SERVICE:  2020     PROCEDURES PERFORMED:  Left heart catheterization and vein graft angiography x2 and LIMA angiography x1 was performed from the left radial artery with a 5-Bengali IM catheter, multipurpose catheter, JL-3.5 catheter, and angled pigtail. Angioplasty and stenting of the vein graft to the obtuse marginal branch was performed with a 6-Bengali AL-1 guide, a Prowater wire, Angiomax for anticoagulation, a 4.0 Spiderwire and Plavix was reloaded at the end of the procedure.   A TR band was placed with good hemostasis at the end of the procedure.     PREOPERATIVE DIAGNOSIS:  Non-ST segment elevation myocardial infarction.     POSTOPERATIVE DIAGNOSIS:  Coronary artery disease.     SURGEON:  Mack Peng MD     ASSISTANT:  None.     ESTIMATED BLOOD LOSS:  5 mL.     SPECIMENS REMOVED:  None.     COMPLICATIONS:  None.     IMPLANTS:  A 3.5 x 30 [place] drug-eluting stent.     ANESTHESIA:  Provided by Liz Golden RN beginning at 12:45, concluding at 13:45. A total of 3 mg of Versed and 125 mcg of fentanyl was given. Vital signs and saturations were stable throughout.     FINDINGS:  Left ventricle shows severely reduced LV systolic function, ejection fraction of 35%. Left ventricular end-diastolic pressure is 18 mmHg with an opening aortic pressure of 140/72. No gradient across the aortic valve. There is inferior akinesis.     The left main coronary artery is highly diseased up to 90%. The LAD is flush occluded and there is ramus intermedius diffusely diseased up to 90%. This is a small vessel, best managed medically.     The circumflex is occluded.     The right coronary artery is a dominant vessel in normal anatomic position, diffusely diseased proximally up to 30% occluded in the midportion.     LIMA to LAD is widely patent. There is also touchdown on a small diagonal branch. There are collaterals to the circumflex system as well as to the RCA. The distal anastomoses are widely patent. The LAD has 30% midvessel irregularity and 90% apical irregularity in a less than 2-mm portion of the vessel.     The vein graft to the RCA is 100% proximally occluded.     The vein graft to the circumflex is diffusely diseased proximally up to 30%. There is 95% midvessel narrowing. The runoff vessel is small, diffusely diseased in the superior limb up to 80%. This vessel is too small for percutaneous intervention.     After Angiomax was given, a Prowater wire was placed in the distal obtuse marginal branch to the vein graft. Attempts at placing a Spiderwire were unsuccessful. A 2.0 x 12 Trek balloon was used to predilate the tract and then the [address] was placed distally. A Spider basket was deployed and then a 3.5 x 30 [place] drug-eluting stent was deployed at 14 atmospheres. There was 0% residual and DANILO-3 flow.   The basket was retrieved with no disruption of the vessel.     CONCLUSIONS:  1. Successful angioplasty and stenting of the vein graft to the obtuse marginal branch. 2.  Three of four patent bypass grafts. 3.  Severely reduced LV systolic function with normal filling pressures. 4.  TR band for hemostasis.         Roopa Villar MD    )  Discuss the patient with other providers: yes  Independent visualization of images, tracings, or specimens: yes    Risk of Complications, Morbidity, and/or Mortality  Presenting problems: high  Diagnostic procedures: low  Management options: moderate    Patient Progress  Patient progress: improved         Procedures

## 2021-12-02 NOTE — H&P
[facility] Cardiology History & Physical      Date of  Admission: 12/2/2021  3:17 AM     Primary Care Physician: Dr. Dinh Hidalgo  Primary Cardiologist: Dr. Milton Price Physician: Dr. Delma Rojas    CC: chest pain     HPI:  Rangel Ortiz is a 61 y.o. male with past medial history of CAD with multiple stents and CABG with redo, HTN, HLD, tobacco abuse, and COPD who presented to the ER via EMS with complaints of chest pain. Patient states that his pain started at 1am. Describes his pain as sharp/stabbing pain located in the center of his chest with radiation to bilateral arms with hand numbness. Denies shortness of breath, nausea, vomiting or diaphoresis. At home, he reports taking 81mg ASA x3 and SL nitro x 3. When pain was not relieved, he called EMS and was instructed to take an additional 81mg ASA. Upon EMS arrival, patient was pain free. Reports that his pain lasted until 2am. Upon arrival to the ER, EKG shows SR without acute ST/T wave changes. Initial hs-troponin is 69.1. He has remained pain free while in the ER tonight. Social history -- stopped smoking cigarettes <6 months ago (1ppd at the time of cessation), continues to smoke 1 cigar daily    Past Medical History:   Diagnosis Date    AAA (abdominal aortic aneurysm) ([address] 9/23/2011 9/29/11 AORTIC ABDOMINAL ANUERYSM REPAIR ENDOVASCULAR ([facility])-  / Kristine Montemayor 4.6cm on US 9/23/11     Acute myocardial infarction [facility]) February, 2010    MI 2/10 - Quad bypass 2/25/10 ,4/2016    Anticoagulation monitoring, INR range 2-3 10/5/2011    Anxiety 11/3/2011    Arthritis 11/3/2011    CAD (coronary artery disease)     CAD (coronary artery disease), native coronary artery 2/24/2010    Cardiomyopathy ([address] 2/24/2010    Chest discomfort 06/16/15    Tightness, Pressure.   Jan 2014:  stress MPI with Lexiscan - normal perfusion, LVEF 52%    Chronic systolic heart failure ([address] 2/24/2010    Claudication ([address] 06/16/15    Claudication, symptom, pain relieved by rest    Congestive heart failure, unspecified     COPD (chronic obstructive pulmonary disease) ([address] 11/3/2011    Extremity atherosclerosis with resting pain ([address] 9/23/2011 9/28/11 ; [facility]- Dr. Soheila Jara GERD (gastroesophageal reflux disease) 11/3/2011    History of AAA (abdominal aortic aneurysm) repair ? ??    including right femoral artery    Hypercholesterolemia     Hyperlipidemia 11/3/2011    Hypertension 11/3/2011    Popliteal artery aneurysm, bilateral History 9/23/2011    L Pop A aneurysm stent graft 9/8/11- Dr. Aiyana Quintana Pop A aneurysm repair      Psychiatric disorder     anxiety    PUD (peptic ulcer disease) ? ??    hx bleeding gastric ulcers     PVD (peripheral vascular disease) ([address] 11/3/2011    S/P AAA (abdominal aortic aneurysm) repair 12/11/2013    [facility]    S/P CABG (coronary artery bypass graft) 4/26/2016    Tobacco abuse 4/23/2014    Weight loss, unintentional 9/23/2011      Past Surgical History:   Procedure Laterality Date    HX AAA REPAIR      10/2011    HX CHOLECYSTECTOMY  2012    HX FEMORAL BYPASS      Left bypass stent graft    [place] BYPASS GRAFT OTHR,FEM-POP  03/18/2011    right leg    [place] BYPASS GRAFT OTHR,FEM-TIBIAL  2012    LLE bypass pop aneurysm    [place] CABG, ARTERY-VEIN, FIVE      2010    [place] CABG, ARTERY-VEIN, FOUR  2010    [place] LAP,CHOLECYSTECTOMY      09/06/11       Allergies   Allergen Reactions    Codeine Rash    Venom-Honey Bee Angioedema      Social History     Socioeconomic History    Marital status:      Spouse name: Not on file    Number of children: Not on file    Years of education: Not on file    Highest education level: Not on file   Occupational History    Not on file   Tobacco Use    Smoking status: Current Every Day Smoker     Packs/day: 0.50     Years: 40.00     Pack years: 20.00     Types: Cigarettes    Smokeless tobacco: Never Used   Substance and Sexual Activity  Alcohol use: No    Drug use: Yes     Types: Marijuana, Prescription     Comment: very rare    Sexual activity: Not on file   Other Topics Concern    Not on file   Social History Narrative    Not on file     Social Determinants of Health     Financial Resource Strain:     Difficulty of Paying Living Expenses: Not on file   Food Insecurity:     Worried About Running Out of Food in the Last Year: Not on file    Yg of Food in the Last Year: Not on file   Transportation Needs:     Lack of Transportation (Medical): Not on file    Lack of Transportation (Non-Medical):  Not on file   Physical Activity:     Days of Exercise per Week: Not on file    Minutes of Exercise per Session: Not on file   Stress:     Feeling of Stress : Not on file   Social Connections:     Frequency of Communication with Friends and Family: Not on file    Frequency of Social Gatherings with Friends and Family: Not on file    Attends [employer] Services: Not on file    Active Member of [address] [place] [address] or Organizations: Not on file    Attends Club or Organization Meetings: Not on file    Marital Status: Not on file   Intimate Partner Violence:     Fear of Current or Ex-Partner: Not on file    Emotionally Abused: Not on file    Physically Abused: Not on file    Sexually Abused: Not on file   Housing Stability:     Unable to Pay for Housing in the Last Year: Not on file    Number of [place] in the Last Year: Not on file    Unstable Housing in the Last Year: Not on file     Family History   Problem Relation Age of Onset    Heart Disease Mother     Hypertension Mother     Cancer Maternal Grandmother     Heart Disease Maternal Grandfather     Hypertension Maternal Uncle     Diabetes Maternal Uncle     Diabetes Brother     Cancer Paternal Uncle         Current Facility-Administered Medications   Medication Dose Route Frequency    sodium chloride (NS) flush 5-10 mL  5-10 mL IntraVENous Q8H    sodium chloride (NS) flush 5-10 mL  5-10 mL IntraVENous PRN    heparin (porcine) 1,000 unit/mL injection 6,480 Units  60 Units/kg IntraVENous ONCE    heparin 25,000 units in dextrose 500 mL infusion  12-25 Units/kg/hr IntraVENous TITRATE    0.9% sodium chloride infusion  50 mL/hr IntraVENous CONTINUOUS     Current Outpatient Medications   Medication Sig    lisinopriL (PRINIVIL, ZESTRIL) 20 mg tablet Take 0.5 Tablets by mouth daily.  carvediloL (COREG) 6.25 mg tablet Take 1 Tablet by mouth two (2) times daily (with meals).  fenofibrate (LOFIBRA) 160 mg tablet TAKE 1 TABLET BY MOUTH ONCE DAILY    atorvastatin (LIPITOR) 40 mg tablet Take  by mouth daily.  clopidogrel (PLAVIX) 75 mg tablet Take 1 Tab by mouth daily.  HYDROcodone-acetaminophen (NORCO)  mg tablet Take 1 Tab by mouth every four (4) hours as needed. Max Daily Amount: 6 Tabs. (Patient taking differently: Take 1 Tab by mouth every four (4) hours as needed. Indications: Pt takes 7.5 mg)    acetaminophen (TYLENOL) 500 mg tablet Take  by mouth every six (6) hours as needed for Pain.  NITROSTAT 0.4 mg SL tablet     omeprazole (PRILOSEC) 40 mg capsule Take 40 mg by mouth daily.  aspirin delayed-release 81 mg tablet Take  by mouth daily.  diazepam (VALIUM) 5 mg tablet Take 5 mg by mouth every twelve (12) hours as needed.  diphenhydrAMINE (ALLERGY) 25 mg capsule Take 25 mg by mouth every six (6) hours as needed. Review of Systems    Review of Systems   Constitutional: Negative. HENT: Negative. Eyes: Negative. Cardiovascular: Positive for chest pain. Gastrointestinal: Negative. Genitourinary: Negative. Musculoskeletal: Negative. Skin: Negative. Neurological: Negative. Endo/Heme/Allergies: Negative. Psychiatric/Behavioral: Negative.            Subjective:     Visit Vitals  BP (!) 146/90 (BP 1 Location: Left upper arm, BP Patient Position: Lying)   Pulse 69   Temp 98.4 °F (36.9 °C)   Resp 18   Ht 6' (1.829 m)   Wt 108 kg (238 lb) SpO2 96%   BMI 32.28 kg/m²     Physical Exam  HENT:      Nose: Nose normal.      Mouth/Throat:      Mouth: Mucous membranes are moist.   Eyes:      Pupils: Pupils are equal, round, and reactive to light. Cardiovascular:      Rate and Rhythm: Normal rate and regular rhythm. Heart sounds: Normal heart sounds. Pulmonary:      Breath sounds: Normal breath sounds. Abdominal:      General: Bowel sounds are normal.   Musculoskeletal:         General: Swelling present. Comments: Trace bilateral LE edema   Skin:     General: Skin is warm and dry. Neurological:      Mental Status: He is alert and oriented to person, place, and time. Psychiatric:         Mood and Affect: Mood normal.         Cardiographics  Telemetry: normal sinus rhythm  ECG: unchanged from previous tracings, normal sinus rhythm  Echocardiogram: ordered    Labs:   Recent Results (from the past 24 hour(s))   TROPONIN-HIGH SENSITIVITY    Collection Time: 12/02/21  3:30 AM   Result Value Ref Range    Troponin-High Sensitivity 69.1 (H) 0 - 14 pg/mL   CBC WITH AUTOMATED DIFF    Collection Time: 12/02/21  3:30 AM   Result Value Ref Range    WBC 9.9 4.3 - 11.1 K/uL    RBC 5.22 4.23 - 5.6 M/uL    HGB 15.3 13.6 - 17.2 g/dL    HCT 46.2 41.1 - 50.3 %    MCV 88.5 79.6 - 97.8 FL    MCH 29.3 26.1 - 32.9 PG    MCHC 33.1 31.4 - 35.0 g/dL    RDW 12.4 11.9 - 14.6 %    PLATELET 701 655 - 440 K/uL    MPV 9.3 (L) 9.4 - 12.3 FL    ABSOLUTE NRBC 0.00 0.0 - 0.2 K/uL    DF AUTOMATED      NEUTROPHILS 68 43 - 78 %    LYMPHOCYTES 21 13 - 44 %    MONOCYTES 6 4.0 - 12.0 %    EOSINOPHILS 3 0.5 - 7.8 %    BASOPHILS 1 0.0 - 2.0 %    IMMATURE GRANULOCYTES 1 0.0 - 5.0 %    ABS. NEUTROPHILS 6.7 1.7 - 8.2 K/UL    ABS. LYMPHOCYTES 2.0 0.5 - 4.6 K/UL    ABS. MONOCYTES 0.6 0.1 - 1.3 K/UL    ABS. EOSINOPHILS 0.3 0.0 - 0.8 K/UL    ABS. BASOPHILS 0.1 0.0 - 0.2 K/UL    ABS. IMM.  GRANS. 0.1 0.0 - 0.5 K/UL   METABOLIC PANEL, COMPREHENSIVE    Collection Time: 12/02/21  3:30 AM Result Value Ref Range    Sodium 143 136 - 145 mmol/L    Potassium 4.4 3.5 - 5.1 mmol/L    Chloride 113 (H) 98 - 107 mmol/L    CO2 24 21 - 32 mmol/L    Anion gap 6 (L) 7 - 16 mmol/L    Glucose 167 (H) 65 - 100 mg/dL    BUN 18 8 - 23 MG/DL    Creatinine 1.44 0.8 - 1.5 MG/DL    GFR est AA >60 >60 ml/min/1.73m2    GFR est non-AA 52 (L) >60 ml/min/1.73m2    Calcium 9.1 8.3 - 10.4 MG/DL    Bilirubin, total 0.4 0.2 - 1.1 MG/DL    ALT (SGPT) 27 12 - 65 U/L    AST (SGOT) 20 15 - 37 U/L    Alk. phosphatase 92 50 - 136 U/L    Protein, total 6.7 6.3 - 8.2 g/dL    Albumin 3.3 3.2 - 4.6 g/dL    Globulin 3.4 2.3 - 3.5 g/dL    A-G Ratio 1.0 (L) 1.2 - 3.5     LIPASE    Collection Time: 12/02/21  3:30 AM   Result Value Ref Range    Lipase 208 73 - 393 U/L   MAGNESIUM    Collection Time: 12/02/21  3:30 AM   Result Value Ref Range    Magnesium 1.9 1.8 - 2.4 mg/dL       Patient has been seen and examined by Dr. Kimberly Jerry and he agrees with the following assessment and plan:     Assessment/Plan:        Unstable angina -- admit to telemetry. Follow serial cardiac enzymes. Continue ASA, Plavix, Coreg, lisinopril and statin. Start IV heparin with bolus. NPO now with IV hydration. Plan for [facility] with possible PCI later today. Check echo and lipid panel this admission. CAD (coronary artery disease), native coronary artery -- last LHC was 11/11/2020. See full report for anatomy. Underwent stenting of dOM to the SVG. Chronic systolic heart failure -- stable. Appears euvolemic on admission. Continue Coreg and Lisinopril. Monitor fluid status during hydration prior to [facility]. Hypertension -- controlled. Continued home medications. Monitor BP closely. Titrate medications as needed. Hyperlipidemia -- continue home statin therapy. Check lipid panel. Overview: Statin intolerant having attempted at least 3 different ones, including pravastatin. Tobacco abuse -- continues to smoke cigars daily.  Importance of cessation discussed and encouraged. Overview: Trying to quit.       S/P CABG (coronary artery bypass graft) -- in 2013 [place] to LAD/diag, SVG to OM and SVG to [facility], NP  12/2/2021 5:34 AM

## 2021-12-02 NOTE — Clinical Note
Contrast Dose Calculator:   Patient's age: 61.   Patient's sex: Male. Patient weight (kg) = 108. Creatinine level (mg/dL) = 1.44. Creatinine clearance (mL/min): 79.17. Contrast concentration (mg/mL) = 370. MACD = 300 mL. Max Contrast dose per Creatinine Cl calculator = 178.13 mL.

## 2021-12-02 NOTE — PROGRESS NOTES
TRANSFER - OUT REPORT:    Left radial [facility] with Dr. Paul Dalton  No intervention    TR band applied to Left radial with 14 ml in band  No bleeding or hematoma, site soft    2 mg versed  50 mcg Fentanyl   2,000 units heparin in radial cocktail      Verbal report given to Cristhian Koehler on Joel Kennedy  being transferred to TELE(unit) for routine progression of care       Report consisted of patients Situation, Background, Assessment and   Recommendations(SBAR). Information from the following report(s) Procedure Summary and MAR was reviewed with the receiving nurse. Opportunity for questions and clarification was provided.       Patient transported with:   Registered Nurse

## 2021-12-02 NOTE — ED NOTES
TRANSFER - OUT REPORT:    Verbal report given to Cata(name) on [facility]  being transferred to [facility](unit) for routine progression of care       Report consisted of patients Situation, Background, Assessment and   Recommendations(SBAR). Information from the following report(s) SBAR was reviewed with the receiving nurse. Lines:   Peripheral IV 12/02/21 Left Antecubital (Active)       Peripheral IV 12/02/21 Right Antecubital (Active)   Site Assessment Clean, dry, & intact 12/02/21 0609   Phlebitis Assessment 0 12/02/21 0609   Infiltration Assessment 0 12/02/21 0609   Dressing Status Clean, dry, & intact 12/02/21 0320        Opportunity for questions and clarification was provided. Patient transported with RN and monitor.

## 2021-12-02 NOTE — ROUTINE PROCESS
Discharge instructions reviewed with Patient. Med info sheets provided for all new medications. Opportunity for questions provided. Patient voiced understanding of all discharge instructions.

## 2021-12-02 NOTE — ROUTINE PROCESS
Bedside and Verbal shift change report given to [employer] (oncoming nurse) by  Markos Coon nurse). Report included the following information SBAR, Kardex, Intake/Output, MAR and Recent Results. Skin assessment completed. Sacrum red but blanchable. Allevyn placed for prevention only. Encouraged repositioning.

## 2021-12-02 NOTE — ED TRIAGE NOTES
Pt arrives to Ed from home by EMS. Pt complaining of CP that started around 1PM today. Pt states that pain was starting from center of chest and radiating down both arms for about 30 mins. Pt took 3 doses of nitro with 5 mins between each dose and also reports taking 3 aspirin. Pt denies SOB and N/V. Pt reports having a stint placed a year ago.

## 2021-12-02 NOTE — PROGRESS NOTES
Pt presented with an acute onset of CP yesterday. He underwent cardiac catheterization with Dr. Steffi Hernandez and was found to have severe native three-vessel CAD. His f/u with [facility] is scheduled with Dr. Steffi Hernandez on 12/16/2021. Pt reported that he lost his mother two weeks ago and his wife had some health issues that left her debilatated as well. He reported that it \"all came to a head last night with everything. \" Is independent with his ADLs and denies DME use, although he has everything from canes to [facility] from taking care of his mother and his wife. Denies HH and STR. Denies D/A. Is able to drive himself. PCP confirmed. Insurance verified and able to afford his meds. Discharge order is in and is discharging home in stable condition. No d/c needs were identified. Tx goals have been met. Care Management Interventions  PCP Verified by CM: Yes ([facility] [address])  Mode of Transport at Discharge:  Other (see comment) (Family)  Transition of Care Consult (CM Consult): Discharge Planning  Discharge Durable Medical Equipment: No  Physical Therapy Consult: No  Occupational Therapy Consult: No  Speech Therapy Consult: No  Support Systems: Spouse/Significant Other, Child(lillian), Other Family Member(s), [employer], Friend/Neighbor  Confirm Follow Up Transport: Family  The Plan for Transition of Care is Related to the Following Treatment Goals : Return home and back to his baseline  The Patient and/or Patient Representative was Provided with a Choice of Provider and Agrees with the Discharge Plan?: Yes  Name of the Patient Representative Who was Provided with a Choice of Provider and Agrees with the Discharge Plan: Patient  Freedom of Choice List was Provided with Basic Dialogue that Supports the Patient's Individualized Plan of Care/Goals, Treatment Preferences and Shares the Quality Data Associated with the Providers?: Yes  [place] Resource Information Provided?: No  Discharge Location  Discharge Placement: Home

## 2022-01-29 ENCOUNTER — HOSPITAL ENCOUNTER (INPATIENT)
Age: 67
LOS: 2 days | Discharge: HOME | DRG: 247 | End: 2022-02-01
Attending: EMERGENCY MEDICINE | Admitting: INTERNAL MEDICINE
Payer: MEDICARE

## 2022-01-29 ENCOUNTER — APPOINTMENT (OUTPATIENT)
Dept: GENERAL RADIOLOGY | Age: 67
DRG: 247 | End: 2022-01-29
Attending: EMERGENCY MEDICINE
Payer: MEDICARE

## 2022-01-29 DIAGNOSIS — I25.10 CORONARY ARTERY DISEASE INVOLVING NATIVE CORONARY ARTERY OF NATIVE HEART WITHOUT ANGINA PECTORIS: Chronic | ICD-10-CM

## 2022-01-29 DIAGNOSIS — J42 CHRONIC BRONCHITIS, UNSPECIFIED CHRONIC BRONCHITIS TYPE (HCC): Chronic | ICD-10-CM

## 2022-01-29 DIAGNOSIS — I50.22 CHRONIC SYSTOLIC HEART FAILURE (HCC): Chronic | ICD-10-CM

## 2022-01-29 DIAGNOSIS — Z45.02 ICD (IMPLANTABLE CARDIOVERTER-DEFIBRILLATOR) DISCHARGE: ICD-10-CM

## 2022-01-29 DIAGNOSIS — I10 PRIMARY HYPERTENSION: Chronic | ICD-10-CM

## 2022-01-29 DIAGNOSIS — R07.9 CHEST PAIN, UNSPECIFIED TYPE: ICD-10-CM

## 2022-01-29 LAB
ALBUMIN SERPL-MCNC: 3.6 G/DL (ref 3.2–4.6)
ALBUMIN/GLOB SERPL: 0.9 {RATIO} (ref 1.2–3.5)
ALP SERPL-CCNC: 94 U/L (ref 50–136)
ALT SERPL-CCNC: 19 U/L (ref 12–65)
ANION GAP SERPL CALC-SCNC: 4 MMOL/L (ref 7–16)
AST SERPL-CCNC: 15 U/L (ref 15–37)
BASOPHILS # BLD: 0.1 K/UL (ref 0–0.2)
BASOPHILS NFR BLD: 1 % (ref 0–2)
BILIRUB SERPL-MCNC: 0.3 MG/DL (ref 0.2–1.1)
BUN SERPL-MCNC: 14 MG/DL (ref 8–23)
CALCIUM SERPL-MCNC: 9 MG/DL (ref 8.3–10.4)
CHLORIDE SERPL-SCNC: 112 MMOL/L (ref 98–107)
CO2 SERPL-SCNC: 23 MMOL/L (ref 21–32)
CREAT SERPL-MCNC: 1.02 MG/DL (ref 0.8–1.5)
DIFFERENTIAL METHOD BLD: ABNORMAL
EOSINOPHIL # BLD: 0.5 K/UL (ref 0–0.8)
EOSINOPHIL NFR BLD: 4 % (ref 0.5–7.8)
ERYTHROCYTE [DISTWIDTH] IN BLOOD BY AUTOMATED COUNT: 12.8 % (ref 11.9–14.6)
GLOBULIN SER CALC-MCNC: 3.9 G/DL (ref 2.3–3.5)
GLUCOSE SERPL-MCNC: 122 MG/DL (ref 65–100)
HCT VFR BLD AUTO: 45.6 % (ref 41.1–50.3)
HGB BLD-MCNC: 15.4 G/DL (ref 13.6–17.2)
IMM GRANULOCYTES # BLD AUTO: 0.1 K/UL (ref 0–0.5)
IMM GRANULOCYTES NFR BLD AUTO: 1 % (ref 0–5)
INR PPP: 1
LIPASE SERPL-CCNC: 132 U/L (ref 73–393)
LYMPHOCYTES # BLD: 1.7 K/UL (ref 0.5–4.6)
LYMPHOCYTES NFR BLD: 12 % (ref 13–44)
MAGNESIUM SERPL-MCNC: 2.1 MG/DL (ref 1.8–2.4)
MCH RBC QN AUTO: 28.3 PG (ref 26.1–32.9)
MCHC RBC AUTO-ENTMCNC: 33.8 G/DL (ref 31.4–35)
MCV RBC AUTO: 83.7 FL (ref 79.6–97.8)
MONOCYTES # BLD: 0.6 K/UL (ref 0.1–1.3)
MONOCYTES NFR BLD: 4 % (ref 4–12)
NEUTS SEG # BLD: 10.9 K/UL (ref 1.7–8.2)
NEUTS SEG NFR BLD: 78 % (ref 43–78)
NRBC # BLD: 0 K/UL (ref 0–0.2)
PLATELET # BLD AUTO: 292 K/UL (ref 150–450)
PMV BLD AUTO: 9.1 FL (ref 9.4–12.3)
POTASSIUM SERPL-SCNC: 3.9 MMOL/L (ref 3.5–5.1)
PROT SERPL-MCNC: 7.5 G/DL (ref 6.3–8.2)
PROTHROMBIN TIME: 13.3 SEC (ref 12.6–14.5)
RBC # BLD AUTO: 5.45 M/UL (ref 4.23–5.6)
SODIUM SERPL-SCNC: 139 MMOL/L (ref 138–145)
TROPONIN-HIGH SENSITIVITY: 106.2 PG/ML (ref 0–14)
TROPONIN-HIGH SENSITIVITY: ABNORMAL PG/ML (ref 0–14)
WBC # BLD AUTO: 13.9 K/UL (ref 4.3–11.1)

## 2022-01-29 PROCEDURE — 74011250636 HC RX REV CODE- 250/636: Performed by: EMERGENCY MEDICINE

## 2022-01-29 PROCEDURE — 71045 X-RAY EXAM CHEST 1 VIEW: CPT

## 2022-01-29 PROCEDURE — 80053 COMPREHEN METABOLIC PANEL: CPT

## 2022-01-29 PROCEDURE — 93005 ELECTROCARDIOGRAM TRACING: CPT | Performed by: EMERGENCY MEDICINE

## 2022-01-29 PROCEDURE — 96375 TX/PRO/DX INJ NEW DRUG ADDON: CPT

## 2022-01-29 PROCEDURE — 84484 ASSAY OF TROPONIN QUANT: CPT

## 2022-01-29 PROCEDURE — 85610 PROTHROMBIN TIME: CPT

## 2022-01-29 PROCEDURE — 85025 COMPLETE CBC W/AUTO DIFF WBC: CPT

## 2022-01-29 PROCEDURE — 99285 EMERGENCY DEPT VISIT HI MDM: CPT

## 2022-01-29 PROCEDURE — 83690 ASSAY OF LIPASE: CPT

## 2022-01-29 PROCEDURE — 83735 ASSAY OF MAGNESIUM: CPT

## 2022-01-29 PROCEDURE — 84443 ASSAY THYROID STIM HORMONE: CPT

## 2022-01-29 RX ORDER — SODIUM CHLORIDE 0.9 % (FLUSH) 0.9 %
5-10 SYRINGE (ML) INJECTION AS NEEDED
Status: DISCONTINUED | OUTPATIENT
Start: 2022-01-29 | End: 2022-01-30

## 2022-01-29 RX ORDER — SODIUM CHLORIDE 0.9 % (FLUSH) 0.9 %
5-10 SYRINGE (ML) INJECTION EVERY 8 HOURS
Status: DISCONTINUED | OUTPATIENT
Start: 2022-01-29 | End: 2022-01-30

## 2022-01-29 RX ORDER — MORPHINE SULFATE 4 MG/ML
4 INJECTION INTRAVENOUS
Status: COMPLETED | OUTPATIENT
Start: 2022-01-29 | End: 2022-01-29

## 2022-01-29 RX ORDER — LORAZEPAM 2 MG/ML
1 INJECTION INTRAMUSCULAR
Status: COMPLETED | OUTPATIENT
Start: 2022-01-29 | End: 2022-01-29

## 2022-01-29 RX ADMIN — MORPHINE SULFATE 4 MG: 4 INJECTION INTRAVENOUS at 20:02

## 2022-01-29 RX ADMIN — LORAZEPAM 1 MG: 2 INJECTION INTRAMUSCULAR; INTRAVENOUS at 20:02

## 2022-01-29 NOTE — Clinical Note
TRANSFER - IN REPORT:     Verbal report received from: 3rd floor rn. Report consisted of patient's Situation, Background, Assessment and   Recommendations(SBAR). Opportunity for questions and clarification was provided. Assessment completed upon patient's arrival to unit and care assumed.

## 2022-01-30 ENCOUNTER — APPOINTMENT (OUTPATIENT)
Dept: NON INVASIVE DIAGNOSTICS | Age: 67
DRG: 247 | End: 2022-01-30
Attending: NURSE PRACTITIONER
Payer: MEDICARE

## 2022-01-30 PROBLEM — R53.83 FATIGUE: Status: ACTIVE | Noted: 2022-01-30

## 2022-01-30 PROBLEM — Z45.02 ICD (IMPLANTABLE CARDIOVERTER-DEFIBRILLATOR) DISCHARGE: Status: ACTIVE | Noted: 2022-01-30

## 2022-01-30 LAB
ANION GAP SERPL CALC-SCNC: 6 MMOL/L (ref 7–16)
ATRIAL RATE: 69 BPM
ATRIAL RATE: 80 BPM
BASOPHILS # BLD: 0.1 K/UL (ref 0–0.2)
BASOPHILS NFR BLD: 1 % (ref 0–2)
BUN SERPL-MCNC: 13 MG/DL (ref 8–23)
CALCIUM SERPL-MCNC: 9 MG/DL (ref 8.3–10.4)
CALCULATED P AXIS, ECG09: 67 DEGREES
CALCULATED R AXIS, ECG10: 54 DEGREES
CALCULATED R AXIS, ECG10: 80 DEGREES
CALCULATED T AXIS, ECG11: -47 DEGREES
CALCULATED T AXIS, ECG11: -54 DEGREES
CHLORIDE SERPL-SCNC: 113 MMOL/L (ref 98–107)
CHOLEST SERPL-MCNC: 245 MG/DL
CO2 SERPL-SCNC: 22 MMOL/L (ref 21–32)
CREAT SERPL-MCNC: 0.93 MG/DL (ref 0.8–1.5)
DIAGNOSIS, 93000: NORMAL
DIAGNOSIS, 93000: NORMAL
DIFFERENTIAL METHOD BLD: ABNORMAL
ECHO LV EDV 3D: 148 ML
ECHO LV EDV A2C: 87 ML
ECHO LV EDV A4C: 127 ML
ECHO LV EDV BP: 107 ML (ref 67–155)
ECHO LV EDV INDEX 3D: 65 ML/M2
ECHO LV EDV INDEX A4C: 56 ML/M2
ECHO LV EDV INDEX BP: 47 ML/M2
ECHO LV EDV NDEX A2C: 38 ML/M2
ECHO LV EJECTION FRACTION 3D: 35 %
ECHO LV EJECTION FRACTION A2C: 40 %
ECHO LV EJECTION FRACTION A4C: 41 %
ECHO LV EJECTION FRACTION BIPLANE: 39 % (ref 55–100)
ECHO LV ESV 3D: 96 ML
ECHO LV ESV A2C: 52 ML
ECHO LV ESV A4C: 76 ML
ECHO LV ESV INDEX 3D: 42 ML/M2
ECHO LV ESV INDEX A2C: 23 ML/M2
ECHO LV ESV INDEX A4C: 34 ML/M2
ECHO LV GLOBAL LONGITUDINAL STRAIN (GLS): -12.2 %
ECHO LV GLOBAL LONGITUDINAL STRAIN (GLS): -13.1 %
ECHO LV GLOBAL LONGITUDINAL STRAIN (GLS): -13.3 %
ECHO LV GLOBAL LONGITUDINAL STRAIN (GLS): -13.9 %
ECHO LV MASS 3D INDEX: 111.1 G/M2
ECHO LV MASS 3D: 251 G
EOSINOPHIL # BLD: 0.5 K/UL (ref 0–0.8)
EOSINOPHIL NFR BLD: 5 % (ref 0.5–7.8)
ERYTHROCYTE [DISTWIDTH] IN BLOOD BY AUTOMATED COUNT: 12.8 % (ref 11.9–14.6)
GLUCOSE SERPL-MCNC: 132 MG/DL (ref 65–100)
HCT VFR BLD AUTO: 44.6 % (ref 41.1–50.3)
HDLC SERPL-MCNC: 26 MG/DL (ref 40–60)
HDLC SERPL: 9.4 {RATIO}
HGB BLD-MCNC: 14.9 G/DL (ref 13.6–17.2)
IMM GRANULOCYTES # BLD AUTO: 0.1 K/UL (ref 0–0.5)
IMM GRANULOCYTES NFR BLD AUTO: 1 % (ref 0–5)
LDLC SERPL CALC-MCNC: 176 MG/DL
LYMPHOCYTES # BLD: 2.4 K/UL (ref 0.5–4.6)
LYMPHOCYTES NFR BLD: 22 % (ref 13–44)
MAGNESIUM SERPL-MCNC: 2.1 MG/DL (ref 1.8–2.4)
MCH RBC QN AUTO: 28.3 PG (ref 26.1–32.9)
MCHC RBC AUTO-ENTMCNC: 33.4 G/DL (ref 31.4–35)
MCV RBC AUTO: 84.8 FL (ref 79.6–97.8)
MONOCYTES # BLD: 0.7 K/UL (ref 0.1–1.3)
MONOCYTES NFR BLD: 7 % (ref 4–12)
NEUTS SEG # BLD: 6.8 K/UL (ref 1.7–8.2)
NEUTS SEG NFR BLD: 64 % (ref 43–78)
NRBC # BLD: 0 K/UL (ref 0–0.2)
P-R INTERVAL, ECG05: 169 MS
P-R INTERVAL, ECG05: 174 MS
PLATELET # BLD AUTO: 253 K/UL (ref 150–450)
PMV BLD AUTO: 9.1 FL (ref 9.4–12.3)
POTASSIUM SERPL-SCNC: 3.5 MMOL/L (ref 3.5–5.1)
Q-T INTERVAL, ECG07: 406 MS
Q-T INTERVAL, ECG07: 462 MS
QRS DURATION, ECG06: 111 MS
QRS DURATION, ECG06: 98 MS
QTC CALCULATION (BEZET), ECG08: 472 MS
QTC CALCULATION (BEZET), ECG08: 495 MS
RBC # BLD AUTO: 5.26 M/UL (ref 4.23–5.6)
SODIUM SERPL-SCNC: 141 MMOL/L (ref 138–145)
TRIGL SERPL-MCNC: 215 MG/DL (ref 35–150)
TROPONIN-HIGH SENSITIVITY: ABNORMAL PG/ML (ref 0–14)
TSH SERPL DL<=0.005 MIU/L-ACNC: 1.11 UIU/ML (ref 0.36–3.74)
UFH PPP CHRO-ACNC: 0.21 IU/ML (ref 0.3–0.7)
UFH PPP CHRO-ACNC: <0.1 IU/ML (ref 0.3–0.7)
VENTRICULAR RATE, ECG03: 69 BPM
VENTRICULAR RATE, ECG03: 81 BPM
VLDLC SERPL CALC-MCNC: 43 MG/DL (ref 6–23)
WBC # BLD AUTO: 10.6 K/UL (ref 4.3–11.1)

## 2022-01-30 PROCEDURE — 80048 BASIC METABOLIC PNL TOTAL CA: CPT

## 2022-01-30 PROCEDURE — 99223 1ST HOSP IP/OBS HIGH 75: CPT | Performed by: INTERNAL MEDICINE

## 2022-01-30 PROCEDURE — 2709999900 HC NON-CHARGEABLE SUPPLY

## 2022-01-30 PROCEDURE — 74011250637 HC RX REV CODE- 250/637: Performed by: NURSE PRACTITIONER

## 2022-01-30 PROCEDURE — 84484 ASSAY OF TROPONIN QUANT: CPT

## 2022-01-30 PROCEDURE — 83735 ASSAY OF MAGNESIUM: CPT

## 2022-01-30 PROCEDURE — 74011000250 HC RX REV CODE- 250: Performed by: NURSE PRACTITIONER

## 2022-01-30 PROCEDURE — 94760 N-INVAS EAR/PLS OXIMETRY 1: CPT

## 2022-01-30 PROCEDURE — 80061 LIPID PANEL: CPT

## 2022-01-30 PROCEDURE — 96365 THER/PROPH/DIAG IV INF INIT: CPT

## 2022-01-30 PROCEDURE — 74011250636 HC RX REV CODE- 250/636: Performed by: NURSE PRACTITIONER

## 2022-01-30 PROCEDURE — 74011250637 HC RX REV CODE- 250/637: Performed by: EMERGENCY MEDICINE

## 2022-01-30 PROCEDURE — C8923 2D TTE W OR W/O FOL W/CON,CO: HCPCS

## 2022-01-30 PROCEDURE — 74011000258 HC RX REV CODE- 258: Performed by: NURSE PRACTITIONER

## 2022-01-30 PROCEDURE — 74011000250 HC RX REV CODE- 250: Performed by: INTERNAL MEDICINE

## 2022-01-30 PROCEDURE — 74011250636 HC RX REV CODE- 250/636: Performed by: INTERNAL MEDICINE

## 2022-01-30 PROCEDURE — 85520 HEPARIN ASSAY: CPT

## 2022-01-30 PROCEDURE — 65660000000 HC RM CCU STEPDOWN

## 2022-01-30 PROCEDURE — 85025 COMPLETE CBC W/AUTO DIFF WBC: CPT

## 2022-01-30 PROCEDURE — 74011250636 HC RX REV CODE- 250/636: Performed by: EMERGENCY MEDICINE

## 2022-01-30 PROCEDURE — 74011250637 HC RX REV CODE- 250/637: Performed by: INTERNAL MEDICINE

## 2022-01-30 PROCEDURE — 96376 TX/PRO/DX INJ SAME DRUG ADON: CPT

## 2022-01-30 PROCEDURE — 36415 COLL VENOUS BLD VENIPUNCTURE: CPT

## 2022-01-30 PROCEDURE — 93005 ELECTROCARDIOGRAM TRACING: CPT | Performed by: INTERNAL MEDICINE

## 2022-01-30 RX ORDER — LORAZEPAM 2 MG/ML
1 INJECTION INTRAMUSCULAR
Status: COMPLETED | OUTPATIENT
Start: 2022-01-30 | End: 2022-01-30

## 2022-01-30 RX ORDER — AMIODARONE HYDROCHLORIDE 200 MG/1
400 TABLET ORAL 2 TIMES DAILY
Status: DISCONTINUED | OUTPATIENT
Start: 2022-01-30 | End: 2022-02-01 | Stop reason: HOSPADM

## 2022-01-30 RX ORDER — ACETAMINOPHEN 500 MG
500 TABLET ORAL
Status: DISCONTINUED | OUTPATIENT
Start: 2022-01-30 | End: 2022-01-31 | Stop reason: SDUPTHER

## 2022-01-30 RX ORDER — CARVEDILOL 6.25 MG/1
6.25 TABLET ORAL 2 TIMES DAILY WITH MEALS
Status: DISCONTINUED | OUTPATIENT
Start: 2022-01-30 | End: 2022-01-30

## 2022-01-30 RX ORDER — SODIUM CHLORIDE 0.9 % (FLUSH) 0.9 %
5-40 SYRINGE (ML) INJECTION EVERY 8 HOURS
Status: DISCONTINUED | OUTPATIENT
Start: 2022-01-30 | End: 2022-02-01 | Stop reason: HOSPADM

## 2022-01-30 RX ORDER — DIPHENHYDRAMINE HCL 25 MG
25 CAPSULE ORAL
Status: DISCONTINUED | OUTPATIENT
Start: 2022-01-30 | End: 2022-02-01 | Stop reason: HOSPADM

## 2022-01-30 RX ORDER — NALOXONE HYDROCHLORIDE 0.4 MG/ML
0.4 INJECTION, SOLUTION INTRAMUSCULAR; INTRAVENOUS; SUBCUTANEOUS AS NEEDED
Status: DISCONTINUED | OUTPATIENT
Start: 2022-01-30 | End: 2022-02-01 | Stop reason: HOSPADM

## 2022-01-30 RX ORDER — SODIUM CHLORIDE 0.9 % (FLUSH) 0.9 %
5-40 SYRINGE (ML) INJECTION AS NEEDED
Status: DISCONTINUED | OUTPATIENT
Start: 2022-01-30 | End: 2022-02-01 | Stop reason: HOSPADM

## 2022-01-30 RX ORDER — LISINOPRIL 5 MG/1
10 TABLET ORAL DAILY
Status: DISCONTINUED | OUTPATIENT
Start: 2022-01-30 | End: 2022-01-30

## 2022-01-30 RX ORDER — MORPHINE SULFATE 4 MG/ML
2 INJECTION INTRAVENOUS
Status: DISCONTINUED | OUTPATIENT
Start: 2022-01-30 | End: 2022-01-31 | Stop reason: SDUPTHER

## 2022-01-30 RX ORDER — DIAZEPAM 5 MG/1
5 TABLET ORAL
Status: DISCONTINUED | OUTPATIENT
Start: 2022-01-30 | End: 2022-01-31 | Stop reason: SDUPTHER

## 2022-01-30 RX ORDER — METOPROLOL TARTRATE 5 MG/5ML
5 INJECTION INTRAVENOUS ONCE
Status: DISPENSED | OUTPATIENT
Start: 2022-01-30 | End: 2022-01-30

## 2022-01-30 RX ORDER — HYDROCODONE BITARTRATE AND ACETAMINOPHEN 10; 325 MG/1; MG/1
1 TABLET ORAL
Status: DISCONTINUED | OUTPATIENT
Start: 2022-01-30 | End: 2022-02-01 | Stop reason: HOSPADM

## 2022-01-30 RX ORDER — CLOPIDOGREL BISULFATE 75 MG/1
75 TABLET ORAL DAILY
Status: DISCONTINUED | OUTPATIENT
Start: 2022-01-30 | End: 2022-02-01 | Stop reason: HOSPADM

## 2022-01-30 RX ORDER — ASPIRIN 81 MG/1
81 TABLET ORAL DAILY
Status: DISCONTINUED | OUTPATIENT
Start: 2022-01-30 | End: 2022-01-31 | Stop reason: SDUPTHER

## 2022-01-30 RX ORDER — CARVEDILOL 12.5 MG/1
12.5 TABLET ORAL 2 TIMES DAILY WITH MEALS
Status: DISCONTINUED | OUTPATIENT
Start: 2022-01-30 | End: 2022-02-01 | Stop reason: HOSPADM

## 2022-01-30 RX ORDER — BUTALBITAL, ACETAMINOPHEN AND CAFFEINE 50; 325; 40 MG/1; MG/1; MG/1
1 TABLET ORAL
Status: DISCONTINUED | OUTPATIENT
Start: 2022-01-30 | End: 2022-02-01 | Stop reason: HOSPADM

## 2022-01-30 RX ORDER — NITROGLYCERIN 0.4 MG/1
0.4 TABLET SUBLINGUAL
Status: DISCONTINUED | OUTPATIENT
Start: 2022-01-30 | End: 2022-01-31 | Stop reason: SDUPTHER

## 2022-01-30 RX ORDER — FENOFIBRATE 160 MG/1
160 TABLET ORAL DAILY
Status: DISCONTINUED | OUTPATIENT
Start: 2022-01-30 | End: 2022-02-01 | Stop reason: HOSPADM

## 2022-01-30 RX ORDER — LISINOPRIL 20 MG/1
20 TABLET ORAL DAILY
Status: DISCONTINUED | OUTPATIENT
Start: 2022-01-31 | End: 2022-02-01

## 2022-01-30 RX ORDER — ATORVASTATIN CALCIUM 40 MG/1
40 TABLET, FILM COATED ORAL DAILY
Status: DISCONTINUED | OUTPATIENT
Start: 2022-01-30 | End: 2022-02-01 | Stop reason: HOSPADM

## 2022-01-30 RX ORDER — HYDROCODONE BITARTRATE AND ACETAMINOPHEN 7.5; 325 MG/1; MG/1
1 TABLET ORAL
Status: COMPLETED | OUTPATIENT
Start: 2022-01-30 | End: 2022-01-30

## 2022-01-30 RX ORDER — HEPARIN SODIUM 5000 [USP'U]/100ML
12-25 INJECTION, SOLUTION INTRAVENOUS
Status: DISCONTINUED | OUTPATIENT
Start: 2022-01-30 | End: 2022-01-30

## 2022-01-30 RX ORDER — MAG HYDROX/ALUMINUM HYD/SIMETH 200-200-20
30 SUSPENSION, ORAL (FINAL DOSE FORM) ORAL
Status: DISCONTINUED | OUTPATIENT
Start: 2022-01-30 | End: 2022-02-01 | Stop reason: HOSPADM

## 2022-01-30 RX ORDER — HEPARIN SODIUM 1000 [USP'U]/ML
60 INJECTION, SOLUTION INTRAVENOUS; SUBCUTANEOUS ONCE
Status: COMPLETED | OUTPATIENT
Start: 2022-01-30 | End: 2022-01-30

## 2022-01-30 RX ORDER — PANTOPRAZOLE SODIUM 40 MG/1
40 TABLET, DELAYED RELEASE ORAL
Status: DISCONTINUED | OUTPATIENT
Start: 2022-01-30 | End: 2022-02-01 | Stop reason: HOSPADM

## 2022-01-30 RX ADMIN — LISINOPRIL 10 MG: 5 TABLET ORAL at 08:48

## 2022-01-30 RX ADMIN — HEPARIN SODIUM 12 UNITS/KG/HR: 5000 INJECTION, SOLUTION INTRAVENOUS at 01:35

## 2022-01-30 RX ADMIN — HEPARIN SODIUM 6480 UNITS: 1000 INJECTION INTRAVENOUS; SUBCUTANEOUS at 01:28

## 2022-01-30 RX ADMIN — SODIUM CHLORIDE, PRESERVATIVE FREE 10 ML: 5 INJECTION INTRAVENOUS at 04:08

## 2022-01-30 RX ADMIN — HYDROCODONE BITARTRATE AND ACETAMINOPHEN 1 TABLET: 10; 325 TABLET ORAL at 04:08

## 2022-01-30 RX ADMIN — SODIUM CHLORIDE, PRESERVATIVE FREE 10 ML: 5 INJECTION INTRAVENOUS at 21:18

## 2022-01-30 RX ADMIN — AMIODARONE HYDROCHLORIDE 1 MG/MIN: 50 INJECTION, SOLUTION INTRAVENOUS at 03:16

## 2022-01-30 RX ADMIN — PERFLUTREN 1 ML: 6.52 INJECTION, SUSPENSION INTRAVENOUS at 14:25

## 2022-01-30 RX ADMIN — HYDROCODONE BITARTRATE AND ACETAMINOPHEN 1 TABLET: 7.5; 325 TABLET ORAL at 00:25

## 2022-01-30 RX ADMIN — CLOPIDOGREL BISULFATE 75 MG: 75 TABLET ORAL at 08:48

## 2022-01-30 RX ADMIN — CARVEDILOL 6.25 MG: 6.25 TABLET, FILM COATED ORAL at 08:48

## 2022-01-30 RX ADMIN — CARVEDILOL 12.5 MG: 12.5 TABLET, FILM COATED ORAL at 17:00

## 2022-01-30 RX ADMIN — ACETAMINOPHEN 500 MG: 500 TABLET ORAL at 09:03

## 2022-01-30 RX ADMIN — DIAZEPAM 5 MG: 5 TABLET ORAL at 22:26

## 2022-01-30 RX ADMIN — ASPIRIN 81 MG: 81 TABLET ORAL at 08:48

## 2022-01-30 RX ADMIN — SODIUM CHLORIDE, PRESERVATIVE FREE 10 ML: 5 INJECTION INTRAVENOUS at 14:00

## 2022-01-30 RX ADMIN — HYDROCODONE BITARTRATE AND ACETAMINOPHEN 1 TABLET: 10; 325 TABLET ORAL at 12:52

## 2022-01-30 RX ADMIN — ATORVASTATIN CALCIUM 40 MG: 40 TABLET, FILM COATED ORAL at 08:48

## 2022-01-30 RX ADMIN — HYDROCODONE BITARTRATE AND ACETAMINOPHEN 1 TABLET: 10; 325 TABLET ORAL at 20:00

## 2022-01-30 RX ADMIN — DIPHENHYDRAMINE HYDROCHLORIDE 25 MG: 25 CAPSULE ORAL at 20:00

## 2022-01-30 RX ADMIN — AMIODARONE HYDROCHLORIDE 150 MG: 50 INJECTION, SOLUTION INTRAVENOUS at 02:59

## 2022-01-30 RX ADMIN — PANTOPRAZOLE SODIUM 40 MG: 40 TABLET, DELAYED RELEASE ORAL at 08:48

## 2022-01-30 RX ADMIN — AMIODARONE HYDROCHLORIDE 400 MG: 200 TABLET ORAL at 18:28

## 2022-01-30 RX ADMIN — NITROGLYCERIN 0.5 INCH: 20 OINTMENT TOPICAL at 12:49

## 2022-01-30 RX ADMIN — NITROGLYCERIN 1 INCH: 20 OINTMENT TOPICAL at 00:17

## 2022-01-30 RX ADMIN — FENOFIBRATE 160 MG: 160 TABLET ORAL at 08:48

## 2022-01-30 RX ADMIN — LORAZEPAM 1 MG: 2 INJECTION INTRAMUSCULAR; INTRAVENOUS at 01:26

## 2022-01-30 RX ADMIN — BUTALBITAL, ACETAMINOPHEN, AND CAFFEINE 1 TABLET: 50; 325; 40 TABLET ORAL at 17:51

## 2022-01-30 NOTE — ED PROVIDER NOTES
Patient with extensive cardiac history presents to our department after having some chest discomfort and having his AICD shocked him at home by his report. States he has been under a great deal of stress due to health issues of numbers of both in his family. He had cardiac work-up done in December with no interventions done by his recall. Has been a long-term smoker though states he is not had any tobacco products for about 3 months other than about 2 total cigarettes. He also has some soreness to his chest to cough. Denies any fever or infected sputum. Denies any dependent edema. Is on blood thinners patient had. Patient questions whether helping someone remove an appliance earlier several hours might have provoked this. The history is provided by the patient. Chest Pain (Angina)   This is a new problem. The pain is associated with normal activity. The pain is moderate. The pain does not radiate. The symptoms are aggravated by movement. Pertinent negatives include no abdominal pain, no diaphoresis, no dizziness, no exertional chest pressure, no fever, no headaches, no irregular heartbeat, no near-syncope, no shortness of breath, no vomiting and no weakness. He has tried nothing for the symptoms. Risk factors include no risk factors. His past medical history does not include DM, DVT or PE. Past Medical History:   Diagnosis Date    AAA (abdominal aortic aneurysm) ([address] 9/23/2011 9/29/11 AORTIC ABDOMINAL ANUERYSM REPAIR ENDOVASCULAR ([facility])-  Jayesh Underwood 4.6cm on US 9/23/11     Acute myocardial infarction [facility]) February, 2010    MI 2/10 - Quad bypass 2/25/10 ,4/2016    Anticoagulation monitoring, INR range 2-3 10/5/2011    Anxiety 11/3/2011    Arthritis 11/3/2011    CAD (coronary artery disease)     CAD (coronary artery disease), native coronary artery 2/24/2010    Cardiomyopathy ([address] 2/24/2010    Chest discomfort 06/16/15    Tightness, Pressure.   Jan 2014:  stress MPI with Lexiscan - normal perfusion, LVEF 52%    Chronic systolic heart failure ([address] 2/24/2010    Claudication ([address] 06/16/15    Claudication, symptom, pain relieved by rest    Congestive heart failure, unspecified     COPD (chronic obstructive pulmonary disease) ([address] 11/3/2011    Extremity atherosclerosis with resting pain ([address] 9/23/2011 9/28/11 ; Aminta Oleary- Dr. Cierra Cuenca GERD (gastroesophageal reflux disease) 11/3/2011    History of AAA (abdominal aortic aneurysm) repair ? ??    including right femoral artery    Hypercholesterolemia     Hyperlipidemia 11/3/2011    Hypertension 11/3/2011    Popliteal artery aneurysm, bilateral History 9/23/2011    L Pop A aneurysm stent graft 9/8/11- Dr. Alysia Langston Pop A aneurysm repair      Psychiatric disorder     anxiety    PUD (peptic ulcer disease) ? ??    hx bleeding gastric ulcers     PVD (peripheral vascular disease) ([address] 11/3/2011    S/P AAA (abdominal aortic aneurysm) repair 12/11/2013    [facility]    S/P CABG (coronary artery bypass graft) 4/26/2016    Tobacco abuse 4/23/2014    Weight loss, unintentional 9/23/2011       Past Surgical History:   Procedure Laterality Date    HX AAA REPAIR      10/2011    HX CHOLECYSTECTOMY  2012    HX FEMORAL BYPASS      Left bypass stent graft    [place] BYPASS GRAFT OTHR,FEM-POP  03/18/2011    right leg    WA BYPASS GRAFT OTHR,FEM-TIBIAL  2012    LLE bypass pop aneurysm    [place] CABG, ARTERY-VEIN, FIVE      2010    [place] CABG, ARTERY-VEIN, FOUR  2010    [place] LAP,CHOLECYSTECTOMY      09/06/11         Family History:   Problem Relation Age of Onset    Heart Disease Mother     Hypertension Mother     Cancer Maternal Grandmother     Heart Disease Maternal Grandfather     Hypertension Maternal Uncle     Diabetes Maternal Uncle     Diabetes Brother     Cancer Paternal Uncle        Social History     Socioeconomic History    Marital status:      Spouse name: Not on file    Number of children: Not on file    Years of education: Not on file    Highest education level: Not on file   Occupational History    Not on file   Tobacco Use    Smoking status: Current Every Day Smoker     Packs/day: 0.50     Years: 40.00     Pack years: 20.00     Types: Cigarettes    Smokeless tobacco: Never Used   Substance and Sexual Activity    Alcohol use: No    Drug use: Yes     Types: Marijuana, Prescription     Comment: very rare    Sexual activity: Not on file   Other Topics Concern    Not on file   Social History Narrative    Not on file     Social Determinants of Health     Financial Resource Strain:     Difficulty of Paying Living Expenses: Not on file   Food Insecurity:     Worried About Running Out of Food in the Last Year: Not on file    Yg of Food in the Last Year: Not on file   Transportation Needs:     Lack of Transportation (Medical): Not on file    Lack of Transportation (Non-Medical):  Not on file   Physical Activity:     Days of Exercise per Week: Not on file    Minutes of Exercise per Session: Not on file   Stress:     Feeling of Stress : Not on file   Social Connections:     Frequency of Communication with Friends and Family: Not on file    Frequency of Social Gatherings with Friends and Family: Not on file    Attends Voodoo Services: Not on file    Active Member of [address] [place] [address] [employer]umb[employer] or Organizations: Not on file    Attends Club or Organization Meetings: Not on file    Marital Status: Not on file   Intimate Partner Violence:     Fear of Current or Ex-Partner: Not on file    Emotionally Abused: Not on file    Physically Abused: Not on file    Sexually Abused: Not on file   Housing Stability:     Unable to Pay for Housing in the Last Year: Not on file    Number of [place] in the Last Year: Not on file    Unstable Housing in the Last Year: Not on file         ALLERGIES: Codeine and Venom-honey bee    Review of Systems   Constitutional: Negative for diaphoresis and fever. Respiratory: Negative for shortness of breath. Cardiovascular: Positive for chest pain. Negative for leg swelling and near-syncope. Gastrointestinal: Negative for abdominal pain and vomiting. Neurological: Negative for dizziness, weakness and headaches. All other systems reviewed and are negative. Vitals:    01/29/22 1930   BP: (!) 157/97   Pulse: 68   Resp: 14   Temp: 98.3 °F (36.8 °C)   SpO2: 97%   Weight: 108 kg (238 lb)   Height: 6' (1.829 m)            Physical Exam  Vitals and nursing note reviewed. Constitutional:       General: He is not in acute distress. Appearance: He is well-developed. HENT:      Head: Atraumatic. Eyes:      General: No scleral icterus. Cardiovascular:      Rate and Rhythm: Normal rate. Pulmonary:      Effort: Pulmonary effort is normal. No respiratory distress. Breath sounds: No wheezing, rhonchi or rales. Abdominal:      Palpations: Abdomen is soft. Musculoskeletal:      Cervical back: Neck supple. Right lower leg: No tenderness. No edema. Left lower leg: No tenderness. No edema. Skin:     General: Skin is warm and dry. Neurological:      General: No focal deficit present. Psychiatric:         Mood and Affect: Mood is anxious. Behavior: Behavior normal. Behavior is not agitated. Thought Content: Thought content normal.          MDM  Number of Diagnoses or Management Options  Diagnosis management comments: Probable exertion induced cardiac ischemia and run of VT possibly provoked by this leading to response by AICD. Some pain on way to ER but resolved here. I sent DR France Richards EKG from EMS when in transit with old comparison. Several discussions(4) with cardiology leading to admission.  Dramatically elevated troponin but pain free in ER and repeat EKG in ER looks close to baseline       Amount and/or Complexity of Data Reviewed  Clinical lab tests: ordered and reviewed  Tests in the radiology section of CPT®: reviewed and ordered  Tests in the medicine section of CPT®: (===================================  ED EKG Interpretation  EKG was interpreted in the absence of a cardiologist.    EKG rhythm: normal sinus rhythm  Rate: 81  ST Segments: Nonspecific ST segments - NO STEMI      Has deep Tw in 1/ looks more ischemic than infarct  José Antonio Swanson MD; 1/30/2022 @1:38 PM================  )  Discuss the patient with other providers: yes  Independent visualization of images, tracings, or specimens: yes    Risk of Complications, Morbidity, and/or Mortality  Presenting problems: moderate  Diagnostic procedures: moderate  Management options: moderate           Critical Care  Performed by: Chuy Garay MD  Authorized by:  Chuy Garay MD     Critical care provider statement:     Critical care time (minutes):  50    Critical care was necessary to treat or prevent imminent or life-threatening deterioration of the following conditions:  Cardiac failure (discussions with EMS/multiple with Dr Pawel Patel)    Critical care was time spent personally by me on the following activities:  Ordering and performing treatments and interventions, ordering and review of laboratory studies, ordering and review of radiographic studies, pulse oximetry, re-evaluation of patient's condition, review of old charts, examination of patient, evaluation of patient's response to treatment and discussions with consultants    I assumed direction of critical care for this patient from another provider in my specialty: no

## 2022-01-30 NOTE — H&P
[facility] CARDIOLOGY PROGRESS NOTE           1/30/2022 10:03 AM    Admit Date: 1/29/2022      Subjective:   Complains of fatigue. Patient seen and examined. H&P transcribed by Mrs Hu Frankel. I agree with her assessment and plan. ROS:  GEN:  No fever or chills  Cardiovascular:  As noted above:CP resolved after ICD shock. Pulmonary:  As noted above:no SOB  Neuro:  No new focal motor or sensory loss    Objective:      Vitals:    01/30/22 0358 01/30/22 0500 01/30/22 0746 01/30/22 0936   BP: (!) 185/105 135/83 126/89 114/79   Pulse: 75 68 69 83   Resp: 20  18    Temp: 97.2 °F (36.2 °C)  98.1 °F (36.7 °C)    SpO2: 97%  96%    Weight: 231 lb 1.6 oz (104.8 kg)      Height:           Physical Exam:  General-no distress  Neck- supple, no JVD  CV- regular rate and rhythm no MRG  Lung- clear bilaterally  Abd- soft, nontender, nondistended  Ext- no edema bilaterally. Skin- warm and dry  Psychiatric:  Normal mood and affect. Neurologic:  Alert and oriented X 3      Data Review:   Recent Labs     01/30/22  0720 01/30/22  0433 01/29/22  1938   NA  --  141 139   K  --  3.5 3.9   MG 2.1  --  2.1   BUN  --  13 14   CREA  --  0.93 1.02   GLU  --  132* 122*   WBC  --  10.6 13.9*   HGB  --  14.9 15.4   HCT  --  44.6 45.6   PLT  --  253 292   INR  --   --  1.0   TRIGL  --  215*  --    HDL  --  26*  --        TELEMETRY: NSR/Sinus bradycardia    Assessment/Plan:     Principal Problem:    ICD (implantable cardioverter-defibrillator) discharge (1/30/2022):VT reported. Interrogation report has not been seen. No recurrent VT. Stop iv Amiodarone. Begin po Amiodarone. Continue Coreg. Cath in 12/2021 did not find any amendable target lesions for revascularization. Active Problems:    CAD (coronary artery disease), native coronary artery (2/24/2010):Continue Coreg and Lisinopril. Chronic systolic heart failure (HCC) (2/24/2010):Repeat limited echo ordered to reassess LV EF. Consider switching Lisinopril to Yana Nip and adding Aldactone if BP allows in the future      Hypertension (11/3/2011):Elevated. Increase Lisinopril and Coreg. Hyperlipidemia (11/3/2011)      Overview: Statin intolerant having attempted at least 3 different ones, including       pravastatin.       Jul 2014 - , , HDL 28, , ALT 33, AST 6      Oct 2014 - , , HDL 26,       Jan 2015 - , , , HDL 31           Fatigue (1/30/2022)                Earnest Kidd MD  1/30/2022 10:03 AM

## 2022-01-30 NOTE — PROGRESS NOTES
Skin assessment completed with 2nd RN. Bilateral heels, sacrum, and posterior ears visualized and assessed with no evidence of redness or breakdown. Patient has generalized dry flaky skin. All toenails thick and mycotic. Small skin tear covered by a bandaid on left forearm. No other abnormalities noted.

## 2022-01-30 NOTE — ED NOTES
TRANSFER - OUT REPORT:    Verbal report given to HUDSON Montesinos on Dre Kelly  being transferred to [address] [place] [address] for routine progression of care       Report consisted of patients Situation, Background, Assessment and   Recommendations(SBAR). Information from the following report(s) SBAR, ED Summary, Procedure Summary, MAR and Recent Results was reviewed with the receiving nurse. Lines:   Peripheral IV 01/29/22 Left;Posterior Hand (Active)       Peripheral IV 01/30/22 Left Antecubital (Active)   Site Assessment Clean, dry, & intact 01/30/22 0253   Phlebitis Assessment 0 01/30/22 0253   Infiltration Assessment 0 01/30/22 0253   Dressing Status Clean, dry, & intact 01/30/22 0253   Dressing Type Transparent 01/30/22 0253   Hub Color/Line Status Green 01/30/22 0253        Opportunity for questions and clarification was provided.       Patient transported with:   Registered Nurse

## 2022-01-30 NOTE — PROGRESS NOTES
Patient is being admitted to floor from ER    Per notes:      [address] [facility] in [facility]    Sp -   [facility] [employer]

## 2022-01-30 NOTE — ED TRIAGE NOTES
Pt arrives via ems from home for cp. States defibrillator shocked him. Started 2 hours ago after strenuous activity. Hx bypass 3 years ago and stent placement one year ago. A/o x4. Cp substernal radiates to left arm. Pt took 324mg asa and 2ntg SL at home. EMS gave 1 SL NTG and 100 mcg fentanyl en route. 20 G L hand.  EMS /111 hr 74 96% RA

## 2022-01-30 NOTE — PROGRESS NOTES
TRANSFER - IN REPORT:    Verbal report received from Meredith Nunn RN on Javi Blas being received from ED for routine progression of care      Report consisted of patients Situation, Background, Assessment and Recommendations(SBAR). Information from the following report(s) SBAR, Kardex, ED Summary, Intake/Output, MAR, Recent Results and Cardiac Rhythm SR was reviewed with the receiving nurse. Opportunity for questions and clarification was provided. Assessment will be completed upon patients arrival to unit and care assumed.

## 2022-01-30 NOTE — ROUTINE PROCESS
Verbal bedside report given to [facility], oncoming RN. Patient's situation, background, assessment and recommendations provided. Opportunity for questions provided. Oncoming RN assumed care of patient. Heparin IV drip verified at bedside with oncoming RN.

## 2022-01-30 NOTE — H&P
[facility] CARDIOLOGY   History & Physical                 Primary Cardiologist: Dr. Adrien Villagomez    Primary Care Physician: Dr. Gray Carreon    Admitting Physician: Dr. Weston Mills:     Patient is a 61 y.o.  male with PMHx of CAD with prior CABG x4, sHF, HTN, HLD, COPD, AAA s/p repair, PAD s/p intervention, GERD and Tobacco abuse who presented to the ED with c/o CP and ICD discharge. States he was moving a refrigerator earlier in the day. Had finished and was walking up the 60ft ramp to his house. He began to feel incredibly weak and SOB. Had some CP. He went inside and essentially crawled to the chair to rest. After about an hour, s/s did not subside so 911 was called. During evaluation by FD, Pt doesn't really remember but family who witnessed event reported that he became \"stiff\" and was shocked by his ICD. States has never been shocked before. He came to the ED for evaluation and management. In the ED: ICD interrogated and reportedly shock was for single episode VT. He had hsTrop 100 --> 10K. BP quite elevated. Cardiology called for evaluation. Pt states that he has been under a lot of stress for the past 19 months taking care of aging and ill family members and 2 members have passed away and now he is only caring for his wife who has memory deficits. He reports that BP gets low and he has dizziness when he takes whole pills and so he has been taking half in AM and PM. Reports sometimes forgets PM dose and sometimes may miss a med for a day or two at a time. Reports \"normal\" /80. Denies CP recently but reports tiredness, fatigue and some SOB that is chronic but worse at times.      Past Medical History:   Diagnosis Date    AAA (abdominal aortic aneurysm) ([facility] 9/23/2011 9/29/11 AORTIC ABDOMINAL ANUERYSM REPAIR ENDOVASCULAR ([facility])-  / Yasmine 4.6cm on US 9/23/11     Acute myocardial infarction [facility]) February, 2010    MI 2/10 - Quad bypass 2/25/10 ,4/2016    Anticoagulation monitoring, INR range 2-3 10/5/2011    Anxiety 11/3/2011    Arthritis 11/3/2011    CAD (coronary artery disease)     CAD (coronary artery disease), native coronary artery 2/24/2010    Cardiomyopathy ([address] 2/24/2010    Chest discomfort 06/16/15    Tightness, Pressure. Jan 2014:  stress MPI with Lexiscan - normal perfusion, LVEF 52%    Chronic systolic heart failure ([address] 2/24/2010    Claudication ([address] 06/16/15    Claudication, symptom, pain relieved by rest    Congestive heart failure, unspecified     COPD (chronic obstructive pulmonary disease) ([address] 11/3/2011    Extremity atherosclerosis with resting pain ([address] 9/23/2011 9/28/11 ; Karyn Burton- Dr. Lizett Collazo GERD (gastroesophageal reflux disease) 11/3/2011    History of AAA (abdominal aortic aneurysm) repair ? ??    including right femoral artery    Hypercholesterolemia     Hyperlipidemia 11/3/2011    Hypertension 11/3/2011    Popliteal artery aneurysm, bilateral History 9/23/2011    L Pop A aneurysm stent graft 9/8/11- Dr. Sarah Spencer Pop A aneurysm repair      Psychiatric disorder     anxiety    PUD (peptic ulcer disease) ? ??    hx bleeding gastric ulcers     PVD (peripheral vascular disease) ([address] 11/3/2011    S/P AAA (abdominal aortic aneurysm) repair 12/11/2013    [facility]    S/P CABG (coronary artery bypass graft) 4/26/2016    Tobacco abuse 4/23/2014    Weight loss, unintentional 9/23/2011      Past Surgical History:   Procedure Laterality Date    HX AAA REPAIR      10/2011    HX CHOLECYSTECTOMY  2012    HX FEMORAL BYPASS      Left bypass stent graft    MN BYPASS GRAFT OTHR,FEM-POP  03/18/2011    right leg    MN BYPASS GRAFT OTHR,FEM-TIBIAL  2012    LLE bypass pop aneurysm    MN CABG, ARTERY-VEIN, FIVE      2010    MN CABG, ARTERY-VEIN, FOUR  2010    MN LAP,CHOLECYSTECTOMY      09/06/11      Allergies   Allergen Reactions    Codeine Rash    Venom-Honey Bee Angioedema     Social History     Tobacco Use    Smoking status: Current Every Day Smoker     Packs/day: 0.50     Years: 40.00     Pack years: 20.00     Types: Cigarettes    Smokeless tobacco: Never Used   Substance Use Topics    Alcohol use: No      FH:   Family History   Problem Relation Age of Onset    Heart Disease Mother     Hypertension Mother     Cancer Maternal Grandmother     Heart Disease Maternal Grandfather     Hypertension Maternal Uncle     Diabetes Maternal Uncle     Diabetes Brother     Cancer Paternal Uncle         Review of Systems  General: no acute weight change, +weakness, +fatigue, no fever or chills, no diaphoresis  Skin: no rashes, lumps, wounds, sores or other skin changes  HEENT: no headache, dizziness, lightheadedness, vision changes, hearing changes, tinnitus, vertigo, sinus pressure/pain, bleeding gums, sore throat, or hoarseness  Neck: no swollen glands, goiter, pain or stiffness  Respiratory: no cough, no congestion, no sputum, no hemoptysis, +dyspnea, no wheezing  Cardiovascular: + as per HPI, no palpitations, syncope, orthopnea, PND  Gastrointestinal: no increased reflux, no constipation, diarrhea, liver problems, GI bleeding, N/V, no abdominal pain or distension  Urinary: no frequency, urgency, hematuria, burning/pain with urination, flank pain, polyuria, nocturia, or difficulty urinating  Peripheral Vascular: no new claudication, leg cramps, prior DVTs, swelling of BLE, color change, or swelling with redness or tenderness  Musculoskeletal: no new muscle or joint pain/stiffness, joint swelling, erythema of joints, or back pain  Psychiatric: no increased depression or anxiety, +stress  Neurological: no sensory or motor loss, seizures, syncope, tremors, numbness, tingling, no changes in mood, attention, or speech, no changes in orientation, memory, insight, or judgment.    Hematologic: no anemia, no easy bruising or bleeding  Endocrine: no thyroid problems, no heat or cold intolerance, excessive sweating, polyuria, polydipsia, no diabetes. Objective:       Visit Vitals  BP (!) 169/108   Pulse 70   Temp 98.3 °F (36.8 °C)   Resp 14   Ht 6' (1.829 m)   Wt 108 kg (238 lb)   SpO2 96%   BMI 32.28 kg/m²       No intake/output data recorded. No intake/output data recorded. Physical Exam:  General: well developed, well nourished, NAD, resting comfortably  HEENT: PERRLA, no abnormalities noted, sclera clear, EOMs intact  Neck: supple, no JVD, trachea midline  Heart: S1S2 with RRR without murmurs, rubs or gallops  Lungs: clear to auscultation bilaterally, normal effort on RA, no wheezing or rales  Abd: soft, nontender, nondistended, +bowel sounds  Ext: warm, no edema, calves supple/nontender, pulses 2+ bilaterally  Skin: warm and dry, intact to view  Psychiatric: appropriate mood and affect, cooperative  Neurologic: A&O X 3, moves all 4 equally, CNs intact      ECG: SR with I/L ST changes present on priors    ECHO: Limited ordered and pending    CXR: No acute findings    Data Review:      Recent Results (from the past 24 hour(s))   TROPONIN-HIGH SENSITIVITY    Collection Time: 01/29/22  7:38 PM   Result Value Ref Range    Troponin-High Sensitivity 106.2 (HH) 0 - 14 pg/mL   CBC WITH AUTOMATED DIFF    Collection Time: 01/29/22  7:38 PM   Result Value Ref Range    WBC 13.9 (H) 4.3 - 11.1 K/uL    RBC 5.45 4.23 - 5.6 M/uL    HGB 15.4 13.6 - 17.2 g/dL    HCT 45.6 41.1 - 50.3 %    MCV 83.7 79.6 - 97.8 FL    MCH 28.3 26.1 - 32.9 PG    MCHC 33.8 31.4 - 35.0 g/dL    RDW 12.8 11.9 - 14.6 %    PLATELET 183 320 - 058 K/uL    MPV 9.1 (L) 9.4 - 12.3 FL    ABSOLUTE NRBC 0.00 0.0 - 0.2 K/uL    DF AUTOMATED      NEUTROPHILS 78 43 - 78 %    LYMPHOCYTES 12 (L) 13 - 44 %    MONOCYTES 4 4.0 - 12.0 %    EOSINOPHILS 4 0.5 - 7.8 %    BASOPHILS 1 0.0 - 2.0 %    IMMATURE GRANULOCYTES 1 0.0 - 5.0 %    ABS. NEUTROPHILS 10.9 (H) 1.7 - 8.2 K/UL    ABS. LYMPHOCYTES 1.7 0.5 - 4.6 K/UL    ABS. MONOCYTES 0.6 0.1 - 1.3 K/UL    ABS. EOSINOPHILS 0.5 0.0 - 0.8 K/UL    ABS. BASOPHILS 0.1 0.0 - 0.2 K/UL    ABS. IMM. GRANS. 0.1 0.0 - 0.5 K/UL   METABOLIC PANEL, COMPREHENSIVE    Collection Time: 01/29/22  7:38 PM   Result Value Ref Range    Sodium 139 138 - 145 mmol/L    Potassium 3.9 3.5 - 5.1 mmol/L    Chloride 112 (H) 98 - 107 mmol/L    CO2 23 21 - 32 mmol/L    Anion gap 4 (L) 7 - 16 mmol/L    Glucose 122 (H) 65 - 100 mg/dL    BUN 14 8 - 23 MG/DL    Creatinine 1.02 0.8 - 1.5 MG/DL    GFR est AA >60 >60 ml/min/1.73m2    GFR est non-AA >60 >60 ml/min/1.73m2    Calcium 9.0 8.3 - 10.4 MG/DL    Bilirubin, total 0.3 0.2 - 1.1 MG/DL    ALT (SGPT) 19 12 - 65 U/L    AST (SGOT) 15 15 - 37 U/L    Alk.  phosphatase 94 50 - 136 U/L    Protein, total 7.5 6.3 - 8.2 g/dL    Albumin 3.6 3.2 - 4.6 g/dL    Globulin 3.9 (H) 2.3 - 3.5 g/dL    A-G Ratio 0.9 (L) 1.2 - 3.5     LIPASE    Collection Time: 01/29/22  7:38 PM   Result Value Ref Range    Lipase 132 73 - 393 U/L   MAGNESIUM    Collection Time: 01/29/22  7:38 PM   Result Value Ref Range    Magnesium 2.1 1.8 - 2.4 mg/dL   PROTHROMBIN TIME + INR    Collection Time: 01/29/22  7:38 PM   Result Value Ref Range    Prothrombin time 13.3 12.6 - 14.5 sec    INR 1.0     TROPONIN-HIGH SENSITIVITY    Collection Time: 01/29/22 10:22 PM   Result Value Ref Range    Troponin-High Sensitivity 10,031.1 (HH) 0 - 14 pg/mL         Assessment/Plan:   Principal Problem:    ICD (implantable cardioverter-defibrillator) discharge -- s/p shock x1 today for reported VTach (reports not available), admit to tele, serial Misty, will give IV Amio for now with plans to review full report when available, had [facility] 12/2021 with no targets for intervention, need for repeat LHC pending clinical course, NPO for now +IVF    Active Problems:    CAD (coronary artery disease), native coronary artery -- cont ASA, plavix, BB, ACEi and statin -- last [facility] 12/2021 w/o targets for intervention      Chronic systolic heart failure -- cont BB, ACEi -- adjust as needed      PVD (peripheral vascular disease) -- cont home meds      COPD (chronic obstructive pulmonary disease) -- has stopped smoking      Hypertension -- BP significantly elevated on arrival to ED, will given dose IV BB now and monitor, cont home meds, may need to adjust vs complaince issue as Pt reports often missing meds      Hyperlipidemia -- cont statin, check lipids         GERD (gastroesophageal reflux disease) -- cont PPI          Aly Ramachandran, NP-C  1/30/2022  2:29 AM

## 2022-01-30 NOTE — ROUTINE PROCESS
Bedside and Verbal shift change report given to self (oncoming nurse) by Marylin Owens RN (offgoing nurse). Report included the following information SBAR, Kardex, Intake/Output, MAR and Recent Results.

## 2022-01-31 LAB
ANION GAP SERPL CALC-SCNC: 6 MMOL/L (ref 7–16)
ATRIAL RATE: 61 BPM
BASOPHILS # BLD: 0.1 K/UL (ref 0–0.2)
BASOPHILS NFR BLD: 1 % (ref 0–2)
BUN SERPL-MCNC: 13 MG/DL (ref 8–23)
CALCIUM SERPL-MCNC: 9 MG/DL (ref 8.3–10.4)
CALCULATED P AXIS, ECG09: -85 DEGREES
CALCULATED R AXIS, ECG10: 52 DEGREES
CALCULATED T AXIS, ECG11: -78 DEGREES
CHLORIDE SERPL-SCNC: 112 MMOL/L (ref 98–107)
CO2 SERPL-SCNC: 25 MMOL/L (ref 21–32)
CREAT SERPL-MCNC: 1.08 MG/DL (ref 0.8–1.5)
DIAGNOSIS, 93000: NORMAL
DIFFERENTIAL METHOD BLD: ABNORMAL
EOSINOPHIL # BLD: 0.5 K/UL (ref 0–0.8)
EOSINOPHIL NFR BLD: 5 % (ref 0.5–7.8)
ERYTHROCYTE [DISTWIDTH] IN BLOOD BY AUTOMATED COUNT: 13.2 % (ref 11.9–14.6)
GLUCOSE SERPL-MCNC: 126 MG/DL (ref 65–100)
HCT VFR BLD AUTO: 42.8 % (ref 41.1–50.3)
HGB BLD-MCNC: 14.1 G/DL (ref 13.6–17.2)
IMM GRANULOCYTES # BLD AUTO: 0.1 K/UL (ref 0–0.5)
IMM GRANULOCYTES NFR BLD AUTO: 1 % (ref 0–5)
LYMPHOCYTES # BLD: 2.4 K/UL (ref 0.5–4.6)
LYMPHOCYTES NFR BLD: 25 % (ref 13–44)
MAGNESIUM SERPL-MCNC: 2.5 MG/DL (ref 1.8–2.4)
MCH RBC QN AUTO: 28.1 PG (ref 26.1–32.9)
MCHC RBC AUTO-ENTMCNC: 32.9 G/DL (ref 31.4–35)
MCV RBC AUTO: 85.4 FL (ref 79.6–97.8)
MONOCYTES # BLD: 0.8 K/UL (ref 0.1–1.3)
MONOCYTES NFR BLD: 8 % (ref 4–12)
NEUTS SEG # BLD: 5.8 K/UL (ref 1.7–8.2)
NEUTS SEG NFR BLD: 61 % (ref 43–78)
NRBC # BLD: 0 K/UL (ref 0–0.2)
P-R INTERVAL, ECG05: 180 MS
PLATELET # BLD AUTO: 235 K/UL (ref 150–450)
PMV BLD AUTO: 9.1 FL (ref 9.4–12.3)
POTASSIUM SERPL-SCNC: 3.5 MMOL/L (ref 3.5–5.1)
Q-T INTERVAL, ECG07: 472 MS
QRS DURATION, ECG06: 100 MS
QTC CALCULATION (BEZET), ECG08: 475 MS
RBC # BLD AUTO: 5.01 M/UL (ref 4.23–5.6)
SODIUM SERPL-SCNC: 143 MMOL/L (ref 136–145)
VENTRICULAR RATE, ECG03: 61 BPM
WBC # BLD AUTO: 9.5 K/UL (ref 4.3–11.1)

## 2022-01-31 PROCEDURE — 027034Z DILATION OF CORONARY ARTERY, ONE ARTERY WITH DRUG-ELUTING INTRALUMINAL DEVICE, PERCUTANEOUS APPROACH: ICD-10-PCS | Performed by: INTERNAL MEDICINE

## 2022-01-31 PROCEDURE — 77030013521 HC DEV INFL BLN BSC -B: Performed by: INTERNAL MEDICINE

## 2022-01-31 PROCEDURE — 85025 COMPLETE CBC W/AUTO DIFF WBC: CPT

## 2022-01-31 PROCEDURE — 93459 L HRT ART/GRFT ANGIO: CPT | Performed by: INTERNAL MEDICINE

## 2022-01-31 PROCEDURE — 74011000636 HC RX REV CODE- 636: Performed by: INTERNAL MEDICINE

## 2022-01-31 PROCEDURE — 93458 L HRT ARTERY/VENTRICLE ANGIO: CPT | Performed by: INTERNAL MEDICINE

## 2022-01-31 PROCEDURE — 2709999900 HC NON-CHARGEABLE SUPPLY

## 2022-01-31 PROCEDURE — 74011000250 HC RX REV CODE- 250: Performed by: INTERNAL MEDICINE

## 2022-01-31 PROCEDURE — 83735 ASSAY OF MAGNESIUM: CPT

## 2022-01-31 PROCEDURE — 80048 BASIC METABOLIC PNL TOTAL CA: CPT

## 2022-01-31 PROCEDURE — 36415 COLL VENOUS BLD VENIPUNCTURE: CPT

## 2022-01-31 PROCEDURE — 65660000000 HC RM CCU STEPDOWN

## 2022-01-31 PROCEDURE — 99153 MOD SED SAME PHYS/QHP EA: CPT | Performed by: INTERNAL MEDICINE

## 2022-01-31 PROCEDURE — 74011000250 HC RX REV CODE- 250: Performed by: NURSE PRACTITIONER

## 2022-01-31 PROCEDURE — C1874 STENT, COATED/COV W/DEL SYS: HCPCS | Performed by: INTERNAL MEDICINE

## 2022-01-31 PROCEDURE — 74011250637 HC RX REV CODE- 250/637: Performed by: INTERNAL MEDICINE

## 2022-01-31 PROCEDURE — 99152 MOD SED SAME PHYS/QHP 5/>YRS: CPT | Performed by: INTERNAL MEDICINE

## 2022-01-31 PROCEDURE — C1894 INTRO/SHEATH, NON-LASER: HCPCS | Performed by: INTERNAL MEDICINE

## 2022-01-31 PROCEDURE — C1769 GUIDE WIRE: HCPCS | Performed by: INTERNAL MEDICINE

## 2022-01-31 PROCEDURE — 74011250636 HC RX REV CODE- 250/636: Performed by: INTERNAL MEDICINE

## 2022-01-31 PROCEDURE — 77030016699 HC CATH ANGI DX INFN1 CARD -A: Performed by: INTERNAL MEDICINE

## 2022-01-31 PROCEDURE — 77030029997 HC DEV COM RDL R BND TELE -B: Performed by: INTERNAL MEDICINE

## 2022-01-31 PROCEDURE — 92937 PRQ TRLUML REVSC CAB GRF 1: CPT | Performed by: INTERNAL MEDICINE

## 2022-01-31 PROCEDURE — 93005 ELECTROCARDIOGRAM TRACING: CPT | Performed by: INTERNAL MEDICINE

## 2022-01-31 PROCEDURE — C1884 EMBOLIZATION PROTECT SYST: HCPCS | Performed by: INTERNAL MEDICINE

## 2022-01-31 PROCEDURE — 74011250637 HC RX REV CODE- 250/637: Performed by: NURSE PRACTITIONER

## 2022-01-31 PROCEDURE — C1887 CATHETER, GUIDING: HCPCS | Performed by: INTERNAL MEDICINE

## 2022-01-31 PROCEDURE — 74011250636 HC RX REV CODE- 250/636: Performed by: NURSE PRACTITIONER

## 2022-01-31 PROCEDURE — 74011000258 HC RX REV CODE- 258: Performed by: INTERNAL MEDICINE

## 2022-01-31 DEVICE — STENT RONYX30018UX RESOLUTE ONYX 3.00X18
Type: IMPLANTABLE DEVICE | Site: HEART | Status: FUNCTIONAL
Brand: RESOLUTE ONYX™

## 2022-01-31 RX ORDER — MORPHINE SULFATE 4 MG/ML
2 INJECTION INTRAVENOUS
Status: DISCONTINUED | OUTPATIENT
Start: 2022-01-31 | End: 2022-02-01 | Stop reason: HOSPADM

## 2022-01-31 RX ORDER — LIDOCAINE HYDROCHLORIDE 10 MG/ML
INJECTION INFILTRATION; PERINEURAL AS NEEDED
Status: DISCONTINUED | OUTPATIENT
Start: 2022-01-31 | End: 2022-01-31 | Stop reason: HOSPADM

## 2022-01-31 RX ORDER — LORAZEPAM 0.5 MG/1
1 TABLET ORAL
Status: DISCONTINUED | OUTPATIENT
Start: 2022-01-31 | End: 2022-02-01 | Stop reason: HOSPADM

## 2022-01-31 RX ORDER — FENTANYL CITRATE 50 UG/ML
INJECTION, SOLUTION INTRAMUSCULAR; INTRAVENOUS AS NEEDED
Status: DISCONTINUED | OUTPATIENT
Start: 2022-01-31 | End: 2022-01-31 | Stop reason: HOSPADM

## 2022-01-31 RX ORDER — GUAIFENESIN 100 MG/5ML
81 LIQUID (ML) ORAL DAILY
Status: DISCONTINUED | OUTPATIENT
Start: 2022-02-01 | End: 2022-02-01 | Stop reason: HOSPADM

## 2022-01-31 RX ORDER — SODIUM CHLORIDE 9 MG/ML
75 INJECTION, SOLUTION INTRAVENOUS CONTINUOUS
Status: DISCONTINUED | OUTPATIENT
Start: 2022-01-31 | End: 2022-02-01

## 2022-01-31 RX ORDER — ACETAMINOPHEN 325 MG/1
650 TABLET ORAL
Status: DISCONTINUED | OUTPATIENT
Start: 2022-01-31 | End: 2022-02-01 | Stop reason: HOSPADM

## 2022-01-31 RX ORDER — SODIUM CHLORIDE 0.9 % (FLUSH) 0.9 %
5-40 SYRINGE (ML) INJECTION EVERY 8 HOURS
Status: DISCONTINUED | OUTPATIENT
Start: 2022-01-31 | End: 2022-02-01 | Stop reason: HOSPADM

## 2022-01-31 RX ORDER — SODIUM CHLORIDE 0.9 % (FLUSH) 0.9 %
5-40 SYRINGE (ML) INJECTION AS NEEDED
Status: DISCONTINUED | OUTPATIENT
Start: 2022-01-31 | End: 2022-02-01 | Stop reason: HOSPADM

## 2022-01-31 RX ORDER — MAG HYDROX/ALUMINUM HYD/SIMETH 200-200-20
SUSPENSION, ORAL (FINAL DOSE FORM) ORAL AS NEEDED
Status: DISCONTINUED | OUTPATIENT
Start: 2022-01-31 | End: 2022-01-31 | Stop reason: HOSPADM

## 2022-01-31 RX ORDER — CLOPIDOGREL BISULFATE 75 MG/1
TABLET ORAL AS NEEDED
Status: DISCONTINUED | OUTPATIENT
Start: 2022-01-31 | End: 2022-01-31 | Stop reason: HOSPADM

## 2022-01-31 RX ORDER — HEPARIN SODIUM 200 [USP'U]/100ML
INJECTION, SOLUTION INTRAVENOUS
Status: COMPLETED | OUTPATIENT
Start: 2022-01-31 | End: 2022-01-31

## 2022-01-31 RX ORDER — MIDAZOLAM HYDROCHLORIDE 1 MG/ML
INJECTION, SOLUTION INTRAMUSCULAR; INTRAVENOUS AS NEEDED
Status: DISCONTINUED | OUTPATIENT
Start: 2022-01-31 | End: 2022-01-31 | Stop reason: HOSPADM

## 2022-01-31 RX ORDER — NITROGLYCERIN 0.4 MG/1
0.4 TABLET SUBLINGUAL
Status: DISCONTINUED | OUTPATIENT
Start: 2022-01-31 | End: 2022-02-01 | Stop reason: HOSPADM

## 2022-01-31 RX ADMIN — SODIUM CHLORIDE, PRESERVATIVE FREE 10 ML: 5 INJECTION INTRAVENOUS at 14:50

## 2022-01-31 RX ADMIN — BUTALBITAL, ACETAMINOPHEN, AND CAFFEINE 1 TABLET: 50; 325; 40 TABLET ORAL at 04:44

## 2022-01-31 RX ADMIN — SODIUM CHLORIDE, PRESERVATIVE FREE 10 ML: 5 INJECTION INTRAVENOUS at 05:13

## 2022-01-31 RX ADMIN — LORAZEPAM 1 MG: 0.5 TABLET ORAL at 21:03

## 2022-01-31 RX ADMIN — BUTALBITAL, ACETAMINOPHEN, AND CAFFEINE 1 TABLET: 50; 325; 40 TABLET ORAL at 16:18

## 2022-01-31 RX ADMIN — CARVEDILOL 12.5 MG: 12.5 TABLET, FILM COATED ORAL at 16:18

## 2022-01-31 RX ADMIN — CLOPIDOGREL BISULFATE 75 MG: 75 TABLET ORAL at 08:10

## 2022-01-31 RX ADMIN — SODIUM CHLORIDE 75 ML/HR: 900 INJECTION, SOLUTION INTRAVENOUS at 08:11

## 2022-01-31 RX ADMIN — PANTOPRAZOLE SODIUM 40 MG: 40 TABLET, DELAYED RELEASE ORAL at 05:13

## 2022-01-31 RX ADMIN — ATORVASTATIN CALCIUM 40 MG: 40 TABLET, FILM COATED ORAL at 08:09

## 2022-01-31 RX ADMIN — HYDROCODONE BITARTRATE AND ACETAMINOPHEN 1 TABLET: 10; 325 TABLET ORAL at 21:03

## 2022-01-31 RX ADMIN — CARVEDILOL 12.5 MG: 12.5 TABLET, FILM COATED ORAL at 08:10

## 2022-01-31 RX ADMIN — AMIODARONE HYDROCHLORIDE 400 MG: 200 TABLET ORAL at 17:45

## 2022-01-31 RX ADMIN — ASPIRIN 81 MG: 81 TABLET ORAL at 08:08

## 2022-01-31 RX ADMIN — FENOFIBRATE 160 MG: 160 TABLET ORAL at 08:10

## 2022-01-31 RX ADMIN — NITROGLYCERIN 0.5 INCH: 20 OINTMENT TOPICAL at 00:17

## 2022-01-31 RX ADMIN — AMIODARONE HYDROCHLORIDE 400 MG: 200 TABLET ORAL at 08:09

## 2022-01-31 NOTE — PROGRESS NOTES
NSTEMI-  worse. Left heart catheterization with possible angioplasty and alternative therapies discussed. Risks and benefits of the procedure including bleeding, arterial injury, infection, MI, stoke, death, emergent cabg, acute renal failure, contrast allergic reaction were discussed and questions answered.

## 2022-01-31 NOTE — PROGRESS NOTES
Problem: Falls - Risk of  Goal: *Absence of Falls  Description: Document Meka Vera Fall Risk and appropriate interventions in the flowsheet. Outcome: Progressing Towards Goal  Note: Fall Risk Interventions:            Medication Interventions: Assess postural VS orthostatic hypotension,Teach patient to arise slowly                   Problem: Pressure Injury - Risk of  Goal: *Prevention of pressure injury  Description: Document Nicola Scale and appropriate interventions in the flowsheet. Outcome: Progressing Towards Goal  Note: Pressure Injury Interventions:             Activity Interventions: Pressure redistribution bed/mattress(bed type),Increase time out of bed

## 2022-01-31 NOTE — RESEARCH NURSE
Pt. Awake alert and oriented x3. Wife at bedside. Patient approached for the AEGIS II Study at Dr. Dominguez Fail  request.  ICF presented and reviewed in detail and left with patient to read and consider. After thorough review of the ICF and discussion with myself, the patient and his wife were able to verbalize understanding of the study purpose, design, risks/benefits, alternatives and costs. Patient understands that study participation is voluntary and he can withdraw from the study at any time. Patient states he will think about it and discuss with his family and let us know his decision in the morning. He states he is leaning towards participating. He is aware that all of the infusions take place at the infusion center on [address] and that the first infusion will need to be done on the day of discharge. Patient denies any questions or concerns at this time. The ICF, a patient/caregiver study brochure and our business card was given to the patient.

## 2022-01-31 NOTE — PROGRESS NOTES
Comprehensive Nutrition Assessment    Type and Reason for Visit: Initial,Positive nutrition screen  Best Practice Alert for Malnutrition Screening Tool: Recently Lost Weight Without Trying: No, If Yes, How Much Weight Loss: Unsure, Eating Poorly Due to Decreased Appetite: No    Nutrition Recommendations/Plan:   Meals and Snacks:  Continue current diet. Nutrition Supplement Therapy:   Medical food supplement therapy:  Discontinue   Discharge Nutrition Plan: Recommend an appointment with nutrition therapy in outpatient setting.  Contact the [facility] Nutrition Counseling Appointment line at [phone] for more information, or have your doctor send referral to Jena Martinez. Fax #: [phone]. Nutrition Assessment:   Nutrition History: Pt reports no noticable appetite or weight loss PTA. Pt reports baseline daily intake of 1 large meal (consisting of a meat, starch, vegetable) and multiple high protein snacks consisting of beef jerky and other meat snacks. Pt reports drinking 1-2 Ensures per day at baseline. Pt reports his baseline bodyweight to be 234-238#. Nutrition Background: PMH significant for CAD, CABG, sHF, HTN, HLD, COPD, abdominal aortic aneurysm, PAD, GERD, Acute MI. Pt presented to the ED with CP s/p ICD discharge. Nutrition Interval:  Pt was seen in bed a/ox4. Pt reported consuming % of all food that has been provided by the hospital. Pt reports feeling very hungry while skipping meal d/t NPO status, thus confirming retention of appetite. An empty sub  was noticed on bedside table, pt claimed to have eaten the entire sandwich yesterday as a snack between lunch and dinner. Pt received heart catheterization today 1/31.     Nutrition Related Findings:   No ashley signs of wasting      Current Nutrition Therapies:  ADULT ORAL NUTRITION SUPPLEMENT Breakfast; Standard High Calorie/High Protein  ADULT DIET Regular    Current Intake:   Average Meal Intake: % (per 1 meal stated by pt) Average Supplement Intake:  (Not received)      Anthropometric Measures:  Height: 6' (182.9 cm)  Current Body Wt: 108 kg (238 lb 1.6 oz) (1/31), Weight source: Standing scale  BMI: 32.3, Obese class 1 (BMI 30.0-34. 9)  Admission Body Weight: 238 lb (1/29: stated)  Ideal Body Weight (lbs) (Calculated): 178 lbs (81 kg),    Usual Body Wt: 108.9 kg (240 lb) (per EMR 1 year), Percent weight change: -0.8          Edema: No data recorded   Estimated Daily Nutrient Needs:  Energy (kcal/day): 8009-7491 (Kcal/kg (20-25), Weight Used: Ideal (81 kg))  Protein (g/day):  Weight Used: ( (20%))  Fluid (ml/day):   (1 ml/kcal)    Nutrition Diagnosis:   · Food & nutrition-related knowledge deficit related to  (daily diet choices) as evidenced by  (reported intake of high sodium snacks, high sat fat diet)    Nutrition Interventions:   Food and/or Nutrient Delivery: Continue current diet,Discontinue oral nutrition supplement  Nutrition Education/Counseling: Education needed  Coordination of Nutrition Care: Continue to monitor while inpatient  Plan of Care discussed with Radha RN and Penny Sheppard RN    Goals: Active Goal: Maintain >75% of needs through PO intake by next f/u.     Nutrition Monitoring and Evaluation:      Food/Nutrient Intake Outcomes: Food and nutrient intake,Supplement intake  Physical Signs/Symptoms Outcomes: Weight    Discharge Planning:    Continue oral nutrition supplement,Continue current diet,Recommend pursue outpatient nutrition counseling    Eliseo Pedroza Dietetic Intern        Disaster Mode Active

## 2022-01-31 NOTE — PROGRESS NOTES
Radial compression band removed at 1415 after slowly reducing air from 12 cc to zero as per hospital protocol. No bleeding or hematoma noted. 2 x 2 gauze with tegaderm placed over puncture site. The affected extremity is warm and dry to the touch. Frequent vital signs printed and placed on bedside chart. Patient instructed to call if any bleeding noted on gauze. Patient verbalized understanding the nursing instructions.

## 2022-01-31 NOTE — PROGRESS NOTES
TRANSFER - OUT REPORT:    [facility] with Dr. Joe Chung  Left radial access  1 stent to vein graft to OM    TR band applied to left radial with 12 ml in band   No bleeding or hematoma, site soft    4 mg versed  75 mcg fentanyl   5,000 units heparin  Angiomax bolus & gtt; gtt stopped at 09:58  225 mg Plavix  30 ml mylanta    Verbal report given to Patsy on Kateryna Anderson  being transferred to [facility] for routine progression of care       Report consisted of patients Situation, Background, Assessment and Recommendations(SBAR). Information from the following report(s) Procedure Summary and MAR was reviewed with the receiving nurse. Opportunity for questions and clarification was provided.       Patient transported with:   Registered Nurse

## 2022-01-31 NOTE — PROGRESS NOTES
Pt presented to the ED c/o CP and ICD discharge. Has a PMHx of CAD with prior CABG x4, sHF, HTN, HLD, COPD, AAA s/p repair, PAD s/p intervention, GERD and Tobacco abuse. Pt is independent with his ADLs and denies DME use, although he has everything from canes to [facility] from taking care of his mother and his wife. Is able to drive himself. Denies HH and STR. Is on RA and denies home oxygen use. Denies D/A. PCP confirmed. Insurance verified and able to afford his meds. Will continue to monitor. Care Management Interventions  PCP Verified by CM: Yes ([facility] [address])  Mode of Transport at Discharge:  Other (see comment) (Family)  Transition of Care Consult (CM Consult): Discharge Planning  Discharge Durable Medical Equipment: No  Physical Therapy Consult: No  Occupational Therapy Consult: No  Speech Therapy Consult: No  Support Systems: Spouse/Significant Other,Child(lillian),Other Family Member(s),[employer] [employer],Friend/Neighbor  Confirm Follow Up Transport: Family  The Plan for Transition of Care is Related to the Following Treatment Goals : Return home and back to his baseline  Discharge Location  Patient Expects to be Discharged to[de-identified] Home

## 2022-01-31 NOTE — ROUTINE PROCESS
Bedside and Verbal shift change report given to Spencer Bishop RN and HUDSON Garcia (oncoming nurse) by self (offgoing nurse). Report included the following information SBAR, Kardex, Intake/Output, MAR and Recent Results.

## 2022-02-01 ENCOUNTER — HOSPITAL ENCOUNTER (OUTPATIENT)
Dept: INFUSION THERAPY | Age: 67
Discharge: HOME | End: 2022-02-01
Payer: MEDICARE

## 2022-02-01 VITALS
DIASTOLIC BLOOD PRESSURE: 67 MMHG | TEMPERATURE: 98.5 F | WEIGHT: 225.4 LBS | HEIGHT: 72 IN | RESPIRATION RATE: 18 BRPM | BODY MASS INDEX: 30.53 KG/M2 | OXYGEN SATURATION: 96 % | HEART RATE: 66 BPM | SYSTOLIC BLOOD PRESSURE: 137 MMHG

## 2022-02-01 VITALS
DIASTOLIC BLOOD PRESSURE: 68 MMHG | SYSTOLIC BLOOD PRESSURE: 123 MMHG | TEMPERATURE: 97.7 F | OXYGEN SATURATION: 98 % | HEART RATE: 66 BPM | RESPIRATION RATE: 18 BRPM

## 2022-02-01 DIAGNOSIS — I21.4 NSTEMI (NON-ST ELEVATED MYOCARDIAL INFARCTION) (HCC): Primary | ICD-10-CM

## 2022-02-01 LAB
ANION GAP SERPL CALC-SCNC: 5 MMOL/L (ref 7–16)
BASOPHILS # BLD: 0.1 K/UL (ref 0–0.2)
BASOPHILS NFR BLD: 1 % (ref 0–2)
BUN SERPL-MCNC: 10 MG/DL (ref 8–23)
CALCIUM SERPL-MCNC: 8.9 MG/DL (ref 8.3–10.4)
CHLORIDE SERPL-SCNC: 114 MMOL/L (ref 98–107)
CO2 SERPL-SCNC: 23 MMOL/L (ref 21–32)
CREAT SERPL-MCNC: 0.89 MG/DL (ref 0.8–1.5)
DIFFERENTIAL METHOD BLD: ABNORMAL
EOSINOPHIL # BLD: 0.4 K/UL (ref 0–0.8)
EOSINOPHIL NFR BLD: 3 % (ref 0.5–7.8)
ERYTHROCYTE [DISTWIDTH] IN BLOOD BY AUTOMATED COUNT: 13.2 % (ref 11.9–14.6)
GLUCOSE SERPL-MCNC: 115 MG/DL (ref 65–100)
HCT VFR BLD AUTO: 43.6 % (ref 41.1–50.3)
HGB BLD-MCNC: 14.4 G/DL (ref 13.6–17.2)
IMM GRANULOCYTES # BLD AUTO: 0.1 K/UL (ref 0–0.5)
IMM GRANULOCYTES NFR BLD AUTO: 1 % (ref 0–5)
LYMPHOCYTES # BLD: 2.3 K/UL (ref 0.5–4.6)
LYMPHOCYTES NFR BLD: 20 % (ref 13–44)
Lab: NORMAL
MCH RBC QN AUTO: 28.2 PG (ref 26.1–32.9)
MCHC RBC AUTO-ENTMCNC: 33 G/DL (ref 31.4–35)
MCV RBC AUTO: 85.5 FL (ref 79.6–97.8)
MONOCYTES # BLD: 0.8 K/UL (ref 0.1–1.3)
MONOCYTES NFR BLD: 7 % (ref 4–12)
NEUTS SEG # BLD: 7.7 K/UL (ref 1.7–8.2)
NEUTS SEG NFR BLD: 68 % (ref 43–78)
NRBC # BLD: 0 K/UL (ref 0–0.2)
PLATELET # BLD AUTO: 281 K/UL (ref 150–450)
PMV BLD AUTO: 10.3 FL (ref 9.4–12.3)
POTASSIUM SERPL-SCNC: 3.6 MMOL/L (ref 3.5–5.1)
RBC # BLD AUTO: 5.1 M/UL (ref 4.23–5.6)
REFERENCE LAB,REFLB: NORMAL
SODIUM SERPL-SCNC: 142 MMOL/L (ref 138–145)
TEST DESCRIPTION:,ATST: NORMAL
WBC # BLD AUTO: 11.3 K/UL (ref 4.3–11.1)

## 2022-02-01 PROCEDURE — 96366 THER/PROPH/DIAG IV INF ADDON: CPT

## 2022-02-01 PROCEDURE — 74011250636 HC RX REV CODE- 250/636: Performed by: INTERNAL MEDICINE

## 2022-02-01 PROCEDURE — 80048 BASIC METABOLIC PNL TOTAL CA: CPT

## 2022-02-01 PROCEDURE — 74011000250 HC RX REV CODE- 250: Performed by: NURSE PRACTITIONER

## 2022-02-01 PROCEDURE — 96365 THER/PROPH/DIAG IV INF INIT: CPT

## 2022-02-01 PROCEDURE — 74011250637 HC RX REV CODE- 250/637: Performed by: INTERNAL MEDICINE

## 2022-02-01 PROCEDURE — 83735 ASSAY OF MAGNESIUM: CPT

## 2022-02-01 PROCEDURE — 36415 COLL VENOUS BLD VENIPUNCTURE: CPT

## 2022-02-01 PROCEDURE — 74011250637 HC RX REV CODE- 250/637: Performed by: NURSE PRACTITIONER

## 2022-02-01 PROCEDURE — 99238 HOSP IP/OBS DSCHRG MGMT 30/<: CPT | Performed by: INTERNAL MEDICINE

## 2022-02-01 PROCEDURE — 85025 COMPLETE CBC W/AUTO DIFF WBC: CPT

## 2022-02-01 PROCEDURE — 74011000250 HC RX REV CODE- 250: Performed by: INTERNAL MEDICINE

## 2022-02-01 RX ORDER — ACETAMINOPHEN 325 MG/1
650 TABLET ORAL AS NEEDED
Status: CANCELLED
Start: 2022-02-01

## 2022-02-01 RX ORDER — LISINOPRIL 20 MG/1
20 TABLET ORAL EVERY 12 HOURS
Status: DISCONTINUED | OUTPATIENT
Start: 2022-02-01 | End: 2022-02-01 | Stop reason: HOSPADM

## 2022-02-01 RX ORDER — ALBUTEROL SULFATE 0.83 MG/ML
2.5 SOLUTION RESPIRATORY (INHALATION) AS NEEDED
Status: CANCELLED
Start: 2022-02-01

## 2022-02-01 RX ORDER — CLOPIDOGREL BISULFATE 75 MG/1
75 TABLET ORAL DAILY
Qty: 30 TABLET | Refills: 11 | Status: SHIPPED | OUTPATIENT
Start: 2022-02-01

## 2022-02-01 RX ORDER — DIPHENHYDRAMINE HYDROCHLORIDE 50 MG/ML
50 INJECTION, SOLUTION INTRAMUSCULAR; INTRAVENOUS AS NEEDED
Status: CANCELLED
Start: 2022-02-01

## 2022-02-01 RX ORDER — DIPHENHYDRAMINE HYDROCHLORIDE 50 MG/ML
25 INJECTION, SOLUTION INTRAMUSCULAR; INTRAVENOUS AS NEEDED
Status: CANCELLED
Start: 2022-02-01

## 2022-02-01 RX ORDER — SODIUM CHLORIDE 9 MG/ML
10 INJECTION INTRAMUSCULAR; INTRAVENOUS; SUBCUTANEOUS AS NEEDED
Status: CANCELLED | OUTPATIENT
Start: 2022-02-01

## 2022-02-01 RX ORDER — HYDROCORTISONE SODIUM SUCCINATE 100 MG/2ML
100 INJECTION, POWDER, FOR SOLUTION INTRAMUSCULAR; INTRAVENOUS AS NEEDED
Status: CANCELLED | OUTPATIENT
Start: 2022-02-01

## 2022-02-01 RX ORDER — HEPARIN 100 UNIT/ML
300-500 SYRINGE INTRAVENOUS AS NEEDED
Status: CANCELLED
Start: 2022-02-01

## 2022-02-01 RX ORDER — AMIODARONE HYDROCHLORIDE 200 MG/1
TABLET ORAL
Qty: 90 TABLET | Refills: 1 | Status: SHIPPED | OUTPATIENT
Start: 2022-02-01

## 2022-02-01 RX ORDER — CARVEDILOL 12.5 MG/1
12.5 TABLET ORAL 2 TIMES DAILY WITH MEALS
Qty: 60 TABLET | Refills: 5 | Status: SHIPPED | OUTPATIENT
Start: 2022-02-01

## 2022-02-01 RX ORDER — LISINOPRIL 20 MG/1
20 TABLET ORAL EVERY 12 HOURS
Qty: 60 TABLET | Refills: 5 | Status: SHIPPED | OUTPATIENT
Start: 2022-02-01 | End: 2022-03-01 | Stop reason: SDUPTHER

## 2022-02-01 RX ORDER — ONDANSETRON 2 MG/ML
8 INJECTION INTRAMUSCULAR; INTRAVENOUS AS NEEDED
Status: CANCELLED | OUTPATIENT
Start: 2022-02-01

## 2022-02-01 RX ORDER — EPINEPHRINE 1 MG/ML
0.3 INJECTION, SOLUTION, CONCENTRATE INTRAVENOUS AS NEEDED
Status: CANCELLED | OUTPATIENT
Start: 2022-02-01

## 2022-02-01 RX ORDER — SODIUM CHLORIDE 0.9 % (FLUSH) 0.9 %
10 SYRINGE (ML) INJECTION AS NEEDED
Status: DISCONTINUED | OUTPATIENT
Start: 2022-02-01 | End: 2022-02-02 | Stop reason: HOSPADM

## 2022-02-01 RX ORDER — SODIUM CHLORIDE 9 MG/ML
25 INJECTION, SOLUTION INTRAVENOUS CONTINUOUS
Status: DISCONTINUED | OUTPATIENT
Start: 2022-02-01 | End: 2022-02-02 | Stop reason: HOSPADM

## 2022-02-01 RX ORDER — POTASSIUM CHLORIDE 20 MEQ/1
40 TABLET, EXTENDED RELEASE ORAL
Status: COMPLETED | OUTPATIENT
Start: 2022-02-01 | End: 2022-02-01

## 2022-02-01 RX ADMIN — SODIUM CHLORIDE, PRESERVATIVE FREE 10 ML: 5 INJECTION INTRAVENOUS at 14:10

## 2022-02-01 RX ADMIN — FENOFIBRATE 160 MG: 160 TABLET ORAL at 08:20

## 2022-02-01 RX ADMIN — Medication 6000 MG: at 15:19

## 2022-02-01 RX ADMIN — ATORVASTATIN CALCIUM 40 MG: 40 TABLET, FILM COATED ORAL at 08:20

## 2022-02-01 RX ADMIN — ASPIRIN 81 MG: 81 TABLET, CHEWABLE ORAL at 08:20

## 2022-02-01 RX ADMIN — PANTOPRAZOLE SODIUM 40 MG: 40 TABLET, DELAYED RELEASE ORAL at 05:49

## 2022-02-01 RX ADMIN — AMIODARONE HYDROCHLORIDE 400 MG: 200 TABLET ORAL at 08:20

## 2022-02-01 RX ADMIN — CARVEDILOL 12.5 MG: 12.5 TABLET, FILM COATED ORAL at 08:20

## 2022-02-01 RX ADMIN — LISINOPRIL 20 MG: 20 TABLET ORAL at 08:21

## 2022-02-01 RX ADMIN — SODIUM CHLORIDE, PRESERVATIVE FREE 10 ML: 5 INJECTION INTRAVENOUS at 05:49

## 2022-02-01 RX ADMIN — CLOPIDOGREL BISULFATE 75 MG: 75 TABLET ORAL at 08:20

## 2022-02-01 RX ADMIN — SODIUM CHLORIDE 25 ML/HR: 9 INJECTION, SOLUTION INTRAVENOUS at 14:26

## 2022-02-01 RX ADMIN — SODIUM CHLORIDE, PRESERVATIVE FREE 10 ML: 5 INJECTION INTRAVENOUS at 17:39

## 2022-02-01 RX ADMIN — POTASSIUM CHLORIDE 40 MEQ: 20 TABLET, EXTENDED RELEASE ORAL at 08:20

## 2022-02-01 NOTE — PROGRESS NOTES
Problem: Falls - Risk of  Goal: *Absence of Falls  Description: Document Stonewall Fail Fall Risk and appropriate interventions in the flowsheet. Outcome: Progressing Towards Goal  Note: Fall Risk Interventions:            Medication Interventions: Teach patient to arise slowly                   Problem: Patient Education: Go to Patient Education Activity  Goal: Patient/Family Education  Outcome: Progressing Towards Goal     Problem: Arrhythmia Pathway (Adult)  Goal: *Absence of arrhythmia  Outcome: Progressing Towards Goal     Problem: Patient Education: Go to Patient Education Activity  Goal: Patient/Family Education  Outcome: Progressing Towards Goal     Problem: Pressure Injury - Risk of  Goal: *Prevention of pressure injury  Description: Document Nicola Scale and appropriate interventions in the flowsheet. Outcome: Progressing Towards Goal  Note: Pressure Injury Interventions:             Activity Interventions: Pressure redistribution bed/mattress(bed type)    Mobility Interventions: Pressure redistribution bed/mattress (bed type)    Nutrition Interventions: Document food/fluid/supplement intake                     Problem: Patient Education: Go to Patient Education Activity  Goal: Patient/Family Education  Outcome: Progressing Towards Goal

## 2022-02-01 NOTE — PROGRESS NOTES
Bedside shift change report given to self (oncoming nurse) by Iván Estes RN (offgoing nurse).  Report included the following information SBAR, Kardex, Procedure Summary, Intake/Output, MAR and Cardiac Rhythm SR, SB.

## 2022-02-01 NOTE — PROGRESS NOTES
[facility] CARDIOLOGY PROGRESS NOTE           2/1/2022 8:06 AM    Admit Date: 1/29/2022      Subjective:   Patient resting comfortably in bed. He denies any chest pain. Monitor demonstrates no recurrent arrhythmias. ROS:  Cardiovascular:  As noted above    Objective:      Vitals:    01/31/22 1651 01/31/22 2013 02/01/22 0023 02/01/22 0427   BP: (!) 146/91 (!) 153/85 (!) 151/90 (!) 140/85   Pulse:  63 63 63   Resp:  17 18 18   Temp:  97.7 °F (36.5 °C) 97.6 °F (36.4 °C) 97.8 °F (36.6 °C)   SpO2:  96% 98% 95%   Weight:    225 lb 6.4 oz (102.2 kg)   Height:           Physical Exam:  General-No Acute Distress  Neck- supple, no JVD  CV- regular rate and rhythm no MRG  Lung- clear bilaterally  Abd- soft, nontender, nondistended  Ext- no edema bilaterally. Skin- warm and dry    Data Review:   Recent Labs     02/01/22  0553 01/31/22  0555 01/30/22  0720 01/30/22  0433 01/30/22  0433 01/29/22 1938 01/29/22 1938    143  --    < > 141   < > 139   K 3.6 3.5  --    < > 3.5   < > 3.9   MG  --  2.5* 2.1  --   --    < > 2.1   BUN 10 13  --    < > 13   < > 14   CREA 0.89 1.08  --    < > 0.93   < > 1.02   * 126*  --    < > 132*   < > 122*   WBC  --  9.5  --   --  10.6   < > 13.9*   HGB  --  14.1  --   --  14.9   < > 15.4   HCT  --  42.8  --   --  44.6   < > 45.6   PLT  --  235  --   --  253   < > 292   INR  --   --   --   --   --   --  1.0   CHOL  --   --   --   --  245*  --   --    LDLC  --   --   --   --  176*  --   --    HDL  --   --   --   --  26*  --   --     < > = values in this interval not displayed. Assessment/Plan:     Principal Problem:    ICD (implantable cardioverter-defibrillator) discharge (1/30/2022)  Patient status post ICD discharge for VT/VF. He was found to have acute hazy thrombotic lesion involving the vein graft to the obtuse marginal status post stenting. He is now on amiodarone 400 mg twice daily which will be continued till follow-up in the office.   We will arrange TC 7 the office with Dr. Amrita Desai. Active Problems:    CAD (coronary artery disease), native coronary artery (2/24/2010)  Patient with complex coronary artery disease. He has a patent left internal mammary artery to the LAD with continuation to the diagonal.  He had acute subocclusion of the vein graft to the OM which is status post stenting. He has chronically occluded vein graft to the right coronary artery and chronically occluded native right coronary artery. Patient has severe LV systolic dysfunction. Patient will remain on aspirin and clopidogrel in the face of subacute occlusion of vein graft and stenting. Patient wishes to proceed with enrollment in the [address] [place] [address] Road II trial.        Chronic systolic heart failure ([facility] [address] (2/24/2010)  EF remains 30-35%. Patient has indwelling ICD with normal function. Patient's blood pressure remains elevated and will increase lisinopril to 20 mg twice daily. Continue carvedilol 12.5 mg twice daily. Volume status appears stable. PVD (peripheral vascular disease) ([facility] [address] (11/3/2011)  Chronic and stable      COPD (chronic obstructive pulmonary disease) ([facility] [address] (11/3/2011)  Stable      Hypertension (11/3/2011)  See above      Hyperlipidemia (11/3/2011)  Continue Lipitor and fenofibrate      GERD (gastroesophageal reflux disease) (11/3/2011)  Chronic and stable      Fatigue (1/30/2022)  Chronic and stable    Patient is stable to discharge home. Patient will need to complete enrollment in research trial prior to discharge. He will be scheduled for first infusion later today.       Mert Mathis MD  2/1/2022 8:06 AM

## 2022-02-01 NOTE — DISCHARGE INSTRUCTIONS
Patient Education   Patient Education   HEART CATHETERIZATION/ANGIOGRAPHY DISCHARGE INSTRUCTIONS    1. Check puncture site frequently for swelling or bleeding. If there is any bleeding, lie down and apply pressure over the area with a clean towel or washcloth. Notify your doctor for any redness, swelling, drainage, or oozing from the puncture site. Notify your doctor for any fever or chills. 2. If the extremity becomes cold, numb, or painful  [address] Cardiology [phone]  3. Activity should be limited for the next 48 hours. Climb stairs as little as possible and avoid any stooping, bending, or strenuous activity for 48 hours. No heavy lifting (anything over 10 pounds) for 3 days. 4. You may resume your usual diet. Drink more fluids than usual.  5. Have a responsible person drive you home and stay with you for at least 24 hours after your heart catheterization/angiography. 6. You may remove bandage from your {ARM/GROIN:76294} in 24 hours. You may shower in 24 hours. No tub baths, hot tubs, or swimming for 1 week. Do not place any lotions, creams, powders, or ointments over puncture site for 1 week. You may place a clean band-aid over the puncture site each day for 5 days. Change daily. I have read the above instructions and have had the opportunity to ask questions. Patient: ________________________   Date: 2/1/2022    Witness: _______________________   Date: 2/1/2022     Coronary Angioplasty: What to Expect at [facility]     Coronary angioplasty is a procedure that is used to open a narrowed or blocked coronary artery. It may also be called a percutaneous coronary intervention (PCI). The doctor opened your narrowed or blocked artery by putting a thin tube, called a catheter, into your heart through a blood vessel. The catheter was inserted into the blood vessel in your groin or arm. The doctor may have placed a small tube, called a stent, in the artery.   Your groin or arm may have a bruise and feel sore for a day or two after the procedure. You can do light activities around the house. But don't do anything strenuous for a day or two. This care sheet gives you a general idea about how long it will take for you to recover. But each person recovers at a different pace. Follow the steps below to get better as quickly as possible. How can you care for yourself at home? Activity    · If the doctor gave you a sedative:  ? For 24 hours, don't do anything that requires attention to detail, such as going to work, making important decisions, or signing any legal documents. It takes time for the medicine's effects to completely wear off.  ? For your safety, do not drive or operate any machinery that could be dangerous. Wait until the medicine wears off and you can think clearly and react easily.     · Do not do strenuous exercise and do not lift, pull, or push anything heavy until your doctor says it is okay. This may be for a day or two. You can walk around the house and do light activity, such as cooking.     · If the catheter was placed in your groin, try not to walk up stairs for the first couple of days.     · If the catheter was placed in your arm near your wrist, do not bend your wrist deeply for the first couple of days. Be careful using your hand to get into and out of a chair or bed.     · Carry your stent identification card with you at all times.     · If your doctor recommends it, get more exercise. Walking is a good choice. Bit by bit, increase the amount you walk every day. Try for at least 30 minutes on most days of the week.     · If you haven't been set up with a cardiac rehab program, talk to your doctor about whether rehab is right for you. Cardiac rehab includes supervised exercise. It also includes help with diet and lifestyle changes and emotional support. Diet    · Drink plenty of fluids to help your body flush out the dye.  If you have kidney, heart, or liver disease and have to limit fluids, talk with your doctor before you increase the amount of fluids you drink.     · Keep eating a heart-healthy diet that has lots of fruits, vegetables, and whole grains. If you have not been eating this way, talk to your doctor. You also may want to talk to a dietitian. This expert can help you to learn about healthy foods and plan meals. Medicines    · Your doctor will tell you if and when you can restart your medicines. Your doctor will also give you instructions about taking any new medicines.     · If you take aspirin or some other blood thinner, ask your doctor if and when to start taking it again. Make sure that you understand exactly what your doctor wants you to do.     · Your doctor will prescribe blood-thinning medicines. You will likely take aspirin plus another antiplatelet, such as clopidogrel (Plavix). It is very important that you take these medicines exactly as directed. These medicines help keep the coronary artery open and reduce your risk of a heart attack.     · Call your doctor if you think you are having a problem with your medicine. Care of the catheter site    · For 1 or 2 days, keep a bandage over the spot where the catheter was inserted. The bandage probably will fall off in this time.     · Put ice or a cold pack on the area for 10 to 20 minutes at a time to help with soreness or swelling. Put a thin cloth between the ice and your skin.     · You may shower 24 to 48 hours after the procedure, if your doctor okays it. Pat the incision dry.     · Do not soak the catheter site until it is healed. Don't take a bath for 1 week, or until your doctor tells you it is okay.     · Watch for bleeding from the site. A small amount of blood (up to the size of a quarter) on the bandage can be normal.     · If you are bleeding, lie down and press on the area for 15 minutes to try to make it stop. If the bleeding does not stop, call your doctor or seek immediate medical care.    Follow-up care is a key part of your treatment and safety. Be sure to make and go to all appointments, and call your doctor if you are having problems. It's also a good idea to know your test results and keep a list of the medicines you take. When should you call for help? Call 911 anytime you think you may need emergency care. For example, call if:    · You passed out (lost consciousness).     · You have severe trouble breathing.     · You have sudden chest pain and shortness of breath, or you cough up blood.     · You have symptoms of a heart attack, such as:  ? Chest pain or pressure. ? Sweating. ? Shortness of breath. ? Nausea or vomiting. ? Pain that spreads from the chest to the neck, jaw, or one or both shoulders or arms. ? Dizziness or lightheadedness. ? A fast or uneven pulse. After calling 911, chew 1 adult-strength aspirin. Wait for an ambulance. Do not try to drive yourself.     · You have been diagnosed with angina, and you have angina symptoms that do not go away with rest or are not getting better within 5 minutes after you take one dose of nitroglycerin. Call your doctor now or seek immediate medical care if:    · You are bleeding from the area where the catheter was put in your artery.     · You have a fast-growing, painful lump at the catheter site.     · You have signs of infection, such as:  ? Increased pain, swelling, warmth, or redness. ? Red streaks leading from the catheter site. ? Pus draining from the catheter site. ? A fever.     · Your leg, arm, or hand is painful, looks blue, or feels cold, numb, or tingly. Watch closely for changes in your health, and be sure to contact your doctor if you have any problems. Where can you learn more? Go to http://www.gray.com/  Enter N157 in the search box to learn more about \"Coronary Angioplasty: What to Expect at Home. \"  Current as of: April 29, 2021               Content Version: 13.0  © 0381-7589 [employer]. Care instructions adapted under license by [employer] (which disclaims liability or warranty for this information). If you have questions about a medical condition or this instruction, always ask your healthcare professional. [address] any warranty or liability for your use of this information. A Healthy Heart: Care Instructions  Your Care Instructions     Coronary artery disease, also called heart disease, occurs when a substance called plaque builds up in the vessels that supply oxygen-rich blood to your heart muscle. This can narrow the blood vessels and reduce blood flow. A heart attack happens when blood flow is completely blocked. A high-fat diet, smoking, and other factors increase the risk of heart disease. Your doctor has found that you have a chance of having heart disease. You can do lots of things to keep your heart healthy. It may not be easy, but you can change your diet, exercise more, and quit smoking. These steps really work to lower your chance of heart disease. Follow-up care is a key part of your treatment and safety. Be sure to make and go to all appointments, and call your doctor if you are having problems. It's also a good idea to know your test results and keep a list of the medicines you take. How can you care for yourself at home? Diet    · Use less salt when you cook and eat. This helps lower your blood pressure. Taste food before salting. Add only a little salt when you think you need it. With time, your taste buds will adjust to less salt.     · Eat fewer snack items, fast foods, canned soups, and other high-salt, high-fat, processed foods.     · Read food labels and try to avoid saturated and trans fats. They increase your risk of heart disease by raising cholesterol levels.     · Limit the amount of solid fat-butter, margarine, and shortening-you eat. Use olive, peanut, or canola oil when you cook.  Bake, broil, and steam foods instead of frying them.     · Eat a variety of fruit and vegetables every day. Dark green, deep orange, red, or yellow fruits and vegetables are especially good for you. Examples include spinach, carrots, peaches, and berries.     · Foods high in fiber can reduce your cholesterol and provide important vitamins and minerals. High-fiber foods include whole-grain cereals and breads, oatmeal, beans, brown rice, citrus fruits, and apples.     · Eat lean proteins. Heart-healthy proteins include seafood, lean meats and poultry, eggs, beans, peas, nuts, seeds, and soy products.     · Limit drinks and foods with added sugar. These include candy, desserts, and soda pop. Lifestyle changes    · If your doctor recommends it, get more exercise. Walking is a good choice. Bit by bit, increase the amount you walk every day. Try for at least 30 minutes on most days of the week. You also may want to swim, bike, or do other activities.     · Do not smoke. If you need help quitting, talk to your doctor about stop-smoking programs and medicines. These can increase your chances of quitting for good. Quitting smoking may be the most important step you can take to protect your heart. It is never too late to quit.     · Limit alcohol to 2 drinks a day for men and 1 drink a day for women. Too much alcohol can cause health problems.     · Manage other health problems such as diabetes, high blood pressure, and high cholesterol. If you think you may have a problem with alcohol or drug use, talk to your doctor. Medicines    · Take your medicines exactly as prescribed. Call your doctor if you think you are having a problem with your medicine.     · If your doctor recommends aspirin, take the amount directed each day. Make sure you take aspirin and not another kind of pain reliever, such as acetaminophen (Tylenol). When should you call for help? Call 911 if you have symptoms of a heart attack.  These may include:    · Chest pain or pressure, or a strange feeling in the chest.     · Sweating.     · Shortness of breath.     · Pain, pressure, or a strange feeling in the back, neck, jaw, or upper belly or in one or both shoulders or arms.     · Lightheadedness or sudden weakness.     · A fast or irregular heartbeat. After you call 911, the  may tell you to chew 1 adult-strength or 2 to 4 low-dose aspirin. Wait for an ambulance. Do not try to drive yourself. Watch closely for changes in your health, and be sure to contact your doctor if you have any problems. Where can you learn more? Go to http://www.nathan.com/  Enter F075 in the search box to learn more about \"A Healthy Heart: Care Instructions. \"  Current as of: April 29, 2021               Content Version: 13.0  © 6436-6008 [employer]. Care instructions adapted under license by [employer] (which disclaims liability or warranty for this information). If you have questions about a medical condition or this instruction, always ask your healthcare professional. [place] [address] any warranty or liability for your use of this information. Patient Education   Amiodarone (By mouth)   Amiodarone Hydrochloride (p-ghi-GZ-da-kelli mynor-droe-KLOR-everardo)  Treats heart rhythm problems. Brand Name(s): Cordarone, Pacerone   There may be other brand names for this medicine. When This Medicine Should Not Be Used: This medicine is not right for everyone. Do not use it if you had an allergic reaction to amiodarone or iodine, or you are pregnant or breastfeeding. How to Use This Medicine:   Tablet  · Take your medicine as directed. Your dose may need to be changed several times to find what works best for you. · Take this medicine the same way every day. This means take it at the same time and take it consistently with or without food. · This medicine should come with a Medication Guide.  Ask your pharmacist for a copy if you do not have one. · Missed dose: Take a dose as soon as you remember. If it is almost time for your next dose, wait until then and take a regular dose. Do not take extra medicine to make up for a missed dose. · Store the medicine in a closed container at room temperature, away from heat, moisture, and direct light. Drugs and Foods to Avoid:   Ask your doctor or pharmacist before using any other medicine, including over-the-counter medicines, vitamins, and herbal products. · Some foods and medicines can affect how amiodarone works. Tell your doctor if you are using any of the following:  ¨ Cimetidine, clopidogrel, cyclosporine, dabigatran, dextromethorphan, digoxin, fentanyl, ledipasvir/sofosbuvir, lithium, loratadine, phenytoin, rifampin, simeprevir, sofosbuvir, or Diane's wort  ¨ Blood pressure medicines (including diltiazem, verapamil)  ¨ Blood thinner (including warfarin)  ¨ Medicine to lower cholesterol (including atorvastatin, cholestyramine, lovastatin, simvastatin)  ¨ Medicine to treat depression (including trazodone)  ¨ Medicine to treat heart rhythm problems (including flecainide, lidocaine, procainamide, quinidine)  ¨ Medicine to treat HIV/AIDS  ¨ Medicine to treat an infection  ¨ Phenothiazine medicine (including chlorpromazine, perphenazine, promethazine, thioridazine)  · Do not eat grapefruit or drink grapefruit juice while you are using this medicine. Warnings While Using This Medicine:   · It is not safe to take this medicine during pregnancy. It could harm an unborn baby. Tell your doctor right away if you become pregnant. · Tell your doctor if you have liver problems, heart disease, heart failure, thyroid problems, or lung disease or breathing problems. Tell your doctor if you have a pacemaker or another implanted heart device.   · This medicine may cause the following problems:  ¨ Lung problems  ¨ Worsening heart rhythm problems  ¨ Thyroid problems  ¨ Liver problems  ¨ Changes in vision  · Do not stop using this medicine suddenly. Your doctor will need to slowly decrease your dose before you stop it completely. · This medicine may make your skin more sensitive to sunlight. Wear sunscreen. Do not use sunlamps or tanning beds. Your skin may become discolored if you use this medicine for a long time. · Your doctor will do lab tests at regular visits to check on the effects of this medicine. Keep all appointments. · Keep all medicine out of the reach of children. Never share your medicine with anyone. Possible Side Effects While Using This Medicine:   Call your doctor right away if you notice any of these side effects:  · Allergic reaction: Itching or hives, swelling in your face or hands, swelling or tingling in your mouth or throat, chest tightness, trouble breathing  · Blistering, peeling, or red skin rash  · Blurred vision or other vision changes, eye pain  · Chest pain, cough, trouble breathing  · Dark urine or pale stools, nausea, vomiting, loss of appetite, stomach pain, yellow skin or eyes  · Fast, slow, pounding, or uneven heartbeat (new or worsening)  · Lightheadedness, dizziness, or fainting  · Numbness, tingling, or burning pain in your hands, arms, legs, or feet  · Weight gain or loss, nervousness, tremors, trouble sleeping, unusual tiredness, hair loss  If you notice these less serious side effects, talk with your doctor:   · Mild nausea, vomiting, or constipation  If you notice other side effects that you think are caused by this medicine, tell your doctor. Call your doctor for medical advice about side effects. You may report side effects to FDA at 7-341-FDA-4781  © 2017 [facility] Information is for End User's use only and may not be sold, redistributed or otherwise used for commercial purposes. The above information is an  only. It is not intended as medical advice for individual conditions or treatments.  Talk to your doctor, nurse or pharmacist before following any medical regimen to see if it is safe and effective for you.

## 2022-02-01 NOTE — ROUTINE PROCESS
Discharge instructions were reviewed with patient. An opportunity was given for questions. All medications were reviewed, and information was given on the new medications. Patient verbalized understanding, and has no questions at this time.    Iv and monitor removed

## 2022-02-01 NOTE — PROGRESS NOTES
Arrived to the [facility]. [facility] infusion infusing; cardiac nurse in room. Patient tolerated well. Any issues or concerns during appointment: none. Patient aware of next infusion appointment on 02/07/2022 (date) at [phone] (time). Discharged ambulatory.  Patient handed off to Valorie Severin

## 2022-02-01 NOTE — PROGRESS NOTES
Pt is discharging home in stable condition. No discharge needs identified. Tx goals have been met. Care Management Interventions  PCP Verified by CM: Yes ([address])  Mode of Transport at Discharge:  Other (see comment) (Family)  Transition of Care Consult (CM Consult): Discharge Planning  Discharge Durable Medical Equipment: No  Physical Therapy Consult: No  Occupational Therapy Consult: No  Speech Therapy Consult: No  Support Systems: Spouse/Significant Other,Child(lillian),Other Family Member(s),[place],Friend/Neighbor  Confirm Follow Up Transport: Self  The Plan for Transition of Care is Related to the Following Treatment Goals : Return home and back to his baseline  The Patient and/or Patient Representative was Provided with a Choice of Provider and Agrees with the Discharge Plan?: Yes  Name of the Patient Representative Who was Provided with a Choice of Provider and Agrees with the Discharge Plan: Patient  Freedom of Choice List was Provided with Basic Dialogue that Supports the Patient's Individualized Plan of Care/Goals, Treatment Preferences and Shares the Quality Data Associated with the Providers?: Yes   Resource Information Provided?: No  Discharge Location  Patient Expects to be Discharged to[de-identified] Home

## 2022-02-01 NOTE — DISCHARGE SUMMARY
[facility] Cardiology Discharge Summary     Patient ID:  Ton Rubi  [id number]  89 y.o.  1955    Admit date: 2022    Discharge date:  22    Admitting Physician: Genna Doan MD     Discharge Physician: Tiffanie Baker NP/Dr. Jailyn Strickland    Admission Diagnoses: ICD (implantable cardioverter-defibrillator) discharge [Z45.02]    Discharge Diagnoses:  NSTEMIT, VT    Active Hospital Problems   Diagnosis   ICD (implantable cardioverter-defibrillator) discharge   Fatigue   NSTEMI (non-ST elevated myocardial infarction) ([facility]   PVD (peripheral vascular disease) [facility]   Hypertension   Hyperlipidemia      GERD (gastroesophageal reflux disease)   COPD (chronic obstructive pulmonary disease) ([facility]   CAD (coronary artery disease), native coronary artery   Chronic systolic heart failure [facility])           Cardiology Procedures this admission:  Left heart catheterization with PCI  EchoCardiogram  Consults: None    Hospital Course: Patient was moving a refrigerator earlier in the day when he started to feel week, sob, and had some CP. The pain did not go away and he called 911. The patient went to the ED where his EKG showed no ST segment elevation but HS Trop T went from 100 to over 10k with an elevated BP. Pt was placed on a heparin drip for NSTEMI. The patient ICD shocked the patient for one episode of VT and was subsequently scheduled for a LHC at [facility] 2022 Patient underwent cardiac catheterization by Dr. Shira Morales that showed the followin. Successful angioplasty and stenting of the vein graft to the obtuse marginal branch     2.  2 of 3 patent bypass grafts     3. Severely reduced LV systolic function with normal filling pressures     4. TR band for hemostasis    Echo Showed:     Left Ventricle: Left ventricle size is normal. Mildly increased wall thickness. Moderate global hypokinesis present.  Moderately reduced left ventricular systolic function with a visually estimated EF of 30 - 35%. Patient tolerated the procedure well and was taken to the telemetry floor for recovery. The following morning patient was up feeling well without any complaints of chest pain or shortness of breath. Patient's right radial cath site was clean, dry and intact without hematoma or bruit. Patient's labs were WNL. Patient was seen and examined by Dr. Elian Mendoza and determined stable and ready for discharge. Patient was instructed on the importance of medication compliance. For the OMT of CAD the patient patient will be on the following regiment: Betablocker therapy. The patient will continue on a ACE. The patient will be on a high intensity statin therapy. . The patient will be on DAPT with for one year with: Nikia Sheldon Due to hypertension the patients BB and ACE were increased this admission. Due to VT the patient will be on Amiodarone 400 mg BID taper, which will be furthered tapered in the office. The patient has also been consented for the AEGIS II  trial. The patient will follow up with [facility] Cardiology Dr. Paul Dalton for a [facility] appointment  and has been referred to cardiac rehab. DISPOSITION: The patient is being discharged home in stable condition on a low saturated fat, low cholesterol and low salt diet. The patient is instructed to advance activities as tolerated to the limit of fatigue or shortness of breath. The patient is instructed to avoid all heavy lifting for 5 days. The patient is instructed to watch the cath site for bleeding/oozing; if seen, the patient is instructed to apply firm pressure with a clean cloth and call [facility] Cardiology at [phone]. The patient is instructed to watch for signs of infection which include: increasing area of redness, fever/hot to touch or purulent drainage at the catheterization site. The patient is instructed not to soak in a bathtub for 7-10 days, but is cleared to shower.  The patient is instructed to call the office or return to the ER for immediate evaluation for any shortness of breath or chest pain not relieved by NTG. Discharge Exam:   Visit Vitals  /67 (BP 1 Location: Right arm)   Pulse 66   Temp 98.5 °F (36.9 °C)   Resp 18   Ht 6' (1.829 m)   Wt 102.2 kg (225 lb 6.4 oz)   SpO2 96%   BMI 30.57 kg/m²     Patient has been seen by Dr. Justina Fabian: see his progress note for exam details.     Recent Results (from the past 24 hour(s))   EKG, 12 LEAD, INITIAL    Collection Time: 01/31/22  1:45 PM   Result Value Ref Range    Ventricular Rate 61 BPM    Atrial Rate 61 BPM    P-R Interval 180 ms    QRS Duration 100 ms    Q-T Interval 472 ms    QTC Calculation (Bezet) 475 ms    Calculated P Axis -85 degrees    Calculated R Axis 52 degrees    Calculated T Axis -78 degrees    Diagnosis       Normal sinus rhythm  Inferior infarct (cited on or before 30-JAN-2022)  ST & T wave abnormality, consider lateral ischemia  Abnormal ECG  When compared with ECG of 30-JAN-2022 11:48,  Ectopic atrial rhythm has replaced Sinus rhythm  Confirmed by DANYELLE SERRANO (), Nikolas Vallejo ([id number]) on 1/31/2022 1:37:07 PM     METABOLIC PANEL, BASIC    Collection Time: 02/01/22  5:53 AM   Result Value Ref Range    Sodium 142 138 - 145 mmol/L    Potassium 3.6 3.5 - 5.1 mmol/L    Chloride 114 (H) 98 - 107 mmol/L    CO2 23 21 - 32 mmol/L    Anion gap 5 (L) 7 - 16 mmol/L    Glucose 115 (H) 65 - 100 mg/dL    BUN 10 8 - 23 MG/DL    Creatinine 0.89 0.8 - 1.5 MG/DL    GFR est AA >60 >60 ml/min/1.73m2    GFR est non-AA >60 >60 ml/min/1.73m2    Calcium 8.9 8.3 - 10.4 MG/DL   CBC WITH AUTOMATED DIFF    Collection Time: 02/01/22  7:20 AM   Result Value Ref Range    WBC 11.3 (H) 4.3 - 11.1 K/uL    RBC 5.10 4.23 - 5.6 M/uL    HGB 14.4 13.6 - 17.2 g/dL    HCT 43.6 41.1 - 50.3 %    MCV 85.5 79.6 - 97.8 FL    MCH 28.2 26.1 - 32.9 PG    MCHC 33.0 31.4 - 35.0 g/dL    RDW 13.2 11.9 - 14.6 %    PLATELET 272 705 - 332 K/uL    MPV 10.3 9.4 - 12.3 FL    ABSOLUTE NRBC 0.00 0.0 - 0.2 K/uL    DF AUTOMATED NEUTROPHILS 68 43 - 78 %    LYMPHOCYTES 20 13 - 44 %    MONOCYTES 7 4.0 - 12.0 %    EOSINOPHILS 3 0.5 - 7.8 %    BASOPHILS 1 0.0 - 2.0 %    IMMATURE GRANULOCYTES 1 0.0 - 5.0 %    ABS. NEUTROPHILS 7.7 1.7 - 8.2 K/UL    ABS. LYMPHOCYTES 2.3 0.5 - 4.6 K/UL    ABS. MONOCYTES 0.8 0.1 - 1.3 K/UL    ABS. EOSINOPHILS 0.4 0.0 - 0.8 K/UL    ABS. BASOPHILS 0.1 0.0 - 0.2 K/UL    ABS. IMM. GRANS. 0.1 0.0 - 0.5 K/UL         Patient Instructions:   Current Discharge Medication List      START taking these medications    Details   amiodarone (CORDARONE) 200 mg tablet Take 400 mg (two tablets) twice a day for 14 days. Then reduce to 400 mg daily (two table) 14 more days. Then 1 tablet daily. Your Cardiologist may change this at follow up. Qty: 90 Tablet, Refills: 1         CONTINUE these medications which have CHANGED    Details   carvediloL (COREG) 12.5 mg tablet Take 1 Tablet by mouth two (2) times daily (with meals). Qty: 60 Tablet, Refills: 5      lisinopriL (PRINIVIL, ZESTRIL) 20 mg tablet Take 1 Tablet by mouth every twelve (12) hours. Qty: 60 Tablet, Refills: 5      clopidogreL (Plavix) 75 mg tab Take 1 Tablet by mouth daily. Qty: 30 Tablet, Refills: 11         CONTINUE these medications which have NOT CHANGED    Details   atorvastatin (LIPITOR) 40 mg tablet Take  by mouth daily. HYDROcodone-acetaminophen (NORCO)  mg tablet Take 1 Tab by mouth every four (4) hours as needed. Max Daily Amount: 6 Tabs. Qty: 20 Tab, Refills: 0      omeprazole (PRILOSEC) 40 mg capsule Take 40 mg by mouth daily. aspirin delayed-release 81 mg tablet Take  by mouth daily. diazepam (VALIUM) 5 mg tablet Take 5 mg by mouth every twelve (12) hours as needed. diphenhydrAMINE (ALLERGY) 25 mg capsule Take 25 mg by mouth every six (6) hours as needed.       fenofibrate (LOFIBRA) 160 mg tablet TAKE 1 TABLET BY MOUTH ONCE DAILY  Qty: 30 Tablet, Refills: 6      acetaminophen (TYLENOL) 500 mg tablet Take  by mouth every six (6) hours as needed for Pain.       NITROSTAT 0.4 mg SL tablet                Signed:  KIANA Witt  2/1/2022  8:25 AM

## 2022-02-01 NOTE — PROGRESS NOTES
Bedside shift change report given to [employer] (oncoming nurse) by self (offgoing nurse).  Report included the following information SBAR, Kardex, Procedure Summary, Intake/Output, MAR and Cardiac Rhythm SR.

## 2022-02-01 NOTE — ROUTINE PROCESS
Cardiac Rehab: Spoke with patient regarding referral to cardiac rehab. Patient meets admission criteria based on PCI (1/31/22). Written information about Cardiac Rehab given and reviewed with patient. Discussed lifestyle modifications to promote cardiac wellness. Pt with history of CABG surgery but patient did not participate in the Cardiac Rehab program. Patient indicated that he does not want to participate in the cardiac rehab program. He states [place]. His Cardiologist is Dr. Last Moore.       Thank you,  NEIL BoschN, RN  Cardiopulmonary Rehabilitation Nurse Liaison  [facility]

## 2022-02-02 ENCOUNTER — HOME HEALTH ADMISSION (OUTPATIENT)
Dept: HOME HEALTH SERVICES | Facility: HOME HEALTH | Age: 67
End: 2022-02-02

## 2022-02-07 ENCOUNTER — HOSPITAL ENCOUNTER (OUTPATIENT)
Dept: INFUSION THERAPY | Age: 67
Discharge: HOME | End: 2022-02-07
Payer: MEDICARE

## 2022-02-07 VITALS
WEIGHT: 232 LBS | BODY MASS INDEX: 31.46 KG/M2 | HEART RATE: 65 BPM | RESPIRATION RATE: 16 BRPM | TEMPERATURE: 97.9 F | DIASTOLIC BLOOD PRESSURE: 86 MMHG | OXYGEN SATURATION: 98 % | SYSTOLIC BLOOD PRESSURE: 150 MMHG

## 2022-02-07 DIAGNOSIS — I21.4 NSTEMI (NON-ST ELEVATED MYOCARDIAL INFARCTION) (HCC): Primary | ICD-10-CM

## 2022-02-07 PROCEDURE — 74011250636 HC RX REV CODE- 250/636: Performed by: INTERNAL MEDICINE

## 2022-02-07 PROCEDURE — 74011000250 HC RX REV CODE- 250: Performed by: INTERNAL MEDICINE

## 2022-02-07 PROCEDURE — 96366 THER/PROPH/DIAG IV INF ADDON: CPT

## 2022-02-07 PROCEDURE — 96365 THER/PROPH/DIAG IV INF INIT: CPT

## 2022-02-07 RX ORDER — EPINEPHRINE 1 MG/ML
0.3 INJECTION, SOLUTION, CONCENTRATE INTRAVENOUS AS NEEDED
Status: CANCELLED | OUTPATIENT
Start: 2022-02-07

## 2022-02-07 RX ORDER — HEPARIN 100 UNIT/ML
300-500 SYRINGE INTRAVENOUS AS NEEDED
Status: CANCELLED
Start: 2022-02-07

## 2022-02-07 RX ORDER — ONDANSETRON 2 MG/ML
8 INJECTION INTRAMUSCULAR; INTRAVENOUS AS NEEDED
Status: CANCELLED | OUTPATIENT
Start: 2022-02-07

## 2022-02-07 RX ORDER — SODIUM CHLORIDE 0.9 % (FLUSH) 0.9 %
10 SYRINGE (ML) INJECTION AS NEEDED
Status: DISCONTINUED | OUTPATIENT
Start: 2022-02-07 | End: 2022-02-08 | Stop reason: HOSPADM

## 2022-02-07 RX ORDER — DIPHENHYDRAMINE HYDROCHLORIDE 50 MG/ML
25 INJECTION, SOLUTION INTRAMUSCULAR; INTRAVENOUS AS NEEDED
Status: CANCELLED
Start: 2022-02-07

## 2022-02-07 RX ORDER — HYDROCORTISONE SODIUM SUCCINATE 100 MG/2ML
100 INJECTION, POWDER, FOR SOLUTION INTRAMUSCULAR; INTRAVENOUS AS NEEDED
Status: CANCELLED | OUTPATIENT
Start: 2022-02-07

## 2022-02-07 RX ORDER — DIPHENHYDRAMINE HYDROCHLORIDE 50 MG/ML
50 INJECTION, SOLUTION INTRAMUSCULAR; INTRAVENOUS AS NEEDED
Status: CANCELLED
Start: 2022-02-07

## 2022-02-07 RX ORDER — ALBUTEROL SULFATE 0.83 MG/ML
2.5 SOLUTION RESPIRATORY (INHALATION) AS NEEDED
Status: CANCELLED
Start: 2022-02-07

## 2022-02-07 RX ORDER — ACETAMINOPHEN 325 MG/1
650 TABLET ORAL AS NEEDED
Status: CANCELLED
Start: 2022-02-07

## 2022-02-07 RX ORDER — SODIUM CHLORIDE 9 MG/ML
10 INJECTION INTRAMUSCULAR; INTRAVENOUS; SUBCUTANEOUS AS NEEDED
Status: CANCELLED | OUTPATIENT
Start: 2022-02-07

## 2022-02-07 RX ORDER — SODIUM CHLORIDE 9 MG/ML
25 INJECTION, SOLUTION INTRAVENOUS CONTINUOUS
Status: DISCONTINUED | OUTPATIENT
Start: 2022-02-07 | End: 2022-02-08 | Stop reason: HOSPADM

## 2022-02-07 RX ADMIN — SODIUM CHLORIDE 25 ML/HR: 9 INJECTION, SOLUTION INTRAVENOUS at 17:18

## 2022-02-07 RX ADMIN — SODIUM CHLORIDE, PRESERVATIVE FREE 10 ML: 5 INJECTION INTRAVENOUS at 14:35

## 2022-02-07 RX ADMIN — SODIUM CHLORIDE, PRESERVATIVE FREE 10 ML: 5 INJECTION INTRAVENOUS at 17:49

## 2022-02-07 RX ADMIN — Medication 6000 MG: at 15:33

## 2022-02-07 NOTE — PROGRESS NOTES
Pt arrived ambulatory with cane to [facility]. IV established with good return. AEGIS infusing. Pt aware of next appt on 2/15/22 at 1030. NS infusing. IV flushed and discontinued. Pt aware of next appt on 2/15/22 at 1030. Discharged ambulatory.

## 2022-02-08 ENCOUNTER — HOME CARE VISIT (OUTPATIENT)
Dept: HOME HEALTH SERVICES | Facility: HOME HEALTH | Age: 67
End: 2022-02-08

## 2022-02-08 NOTE — CASE COMMUNICATION
Pt nonadmit to homecare services as pt does nt meet homebound requirements. Pt driving daily and condcuting ADLS independently without difficulty. Pt /80, HR 60, spo2 99%. Pt maintains spo2 of 99% without SOB with walk out to yard of home and back. Pt not using assistive device. SN spoke with pt about cardiac rehab at [facility]. Pt states he was given information about cadiac rehab prior to dc from hospital and would follow  up with them on 2.14.22.  lm with Betty at Dr. Anne Mckeon office 2.8.22 [phone] with above.

## 2022-02-15 ENCOUNTER — HOSPITAL ENCOUNTER (OUTPATIENT)
Dept: INFUSION THERAPY | Age: 67
Discharge: HOME | End: 2022-02-15
Payer: MEDICARE

## 2022-02-15 VITALS
RESPIRATION RATE: 16 BRPM | SYSTOLIC BLOOD PRESSURE: 159 MMHG | HEART RATE: 69 BPM | OXYGEN SATURATION: 95 % | TEMPERATURE: 97.8 F | DIASTOLIC BLOOD PRESSURE: 80 MMHG | BODY MASS INDEX: 32.14 KG/M2 | WEIGHT: 237 LBS

## 2022-02-15 DIAGNOSIS — I21.4 NSTEMI (NON-ST ELEVATED MYOCARDIAL INFARCTION) (HCC): Primary | ICD-10-CM

## 2022-02-15 PROCEDURE — 74011250636 HC RX REV CODE- 250/636: Performed by: INTERNAL MEDICINE

## 2022-02-15 PROCEDURE — 96365 THER/PROPH/DIAG IV INF INIT: CPT

## 2022-02-15 PROCEDURE — 74011000250 HC RX REV CODE- 250: Performed by: INTERNAL MEDICINE

## 2022-02-15 PROCEDURE — 96366 THER/PROPH/DIAG IV INF ADDON: CPT

## 2022-02-15 RX ORDER — SODIUM CHLORIDE 0.9 % (FLUSH) 0.9 %
10 SYRINGE (ML) INJECTION AS NEEDED
Status: ACTIVE | OUTPATIENT
Start: 2022-02-15 | End: 2022-02-15

## 2022-02-15 RX ORDER — SODIUM CHLORIDE 9 MG/ML
25 INJECTION, SOLUTION INTRAVENOUS CONTINUOUS
Status: ACTIVE | OUTPATIENT
Start: 2022-02-15 | End: 2022-02-15

## 2022-02-15 RX ORDER — HEPARIN 100 UNIT/ML
300-500 SYRINGE INTRAVENOUS AS NEEDED
Status: CANCELLED
Start: 2022-02-15

## 2022-02-15 RX ORDER — SODIUM CHLORIDE 9 MG/ML
10 INJECTION INTRAMUSCULAR; INTRAVENOUS; SUBCUTANEOUS AS NEEDED
Status: CANCELLED | OUTPATIENT
Start: 2022-02-15

## 2022-02-15 RX ORDER — DIPHENHYDRAMINE HYDROCHLORIDE 50 MG/ML
50 INJECTION, SOLUTION INTRAMUSCULAR; INTRAVENOUS AS NEEDED
Status: CANCELLED
Start: 2022-02-15

## 2022-02-15 RX ORDER — ACETAMINOPHEN 325 MG/1
650 TABLET ORAL AS NEEDED
Status: CANCELLED
Start: 2022-02-15

## 2022-02-15 RX ORDER — ALBUTEROL SULFATE 0.83 MG/ML
2.5 SOLUTION RESPIRATORY (INHALATION) AS NEEDED
Status: CANCELLED
Start: 2022-02-15

## 2022-02-15 RX ORDER — ONDANSETRON 2 MG/ML
8 INJECTION INTRAMUSCULAR; INTRAVENOUS AS NEEDED
Status: CANCELLED | OUTPATIENT
Start: 2022-02-15

## 2022-02-15 RX ORDER — DIPHENHYDRAMINE HYDROCHLORIDE 50 MG/ML
25 INJECTION, SOLUTION INTRAMUSCULAR; INTRAVENOUS AS NEEDED
Status: CANCELLED
Start: 2022-02-15

## 2022-02-15 RX ORDER — HYDROCORTISONE SODIUM SUCCINATE 100 MG/2ML
100 INJECTION, POWDER, FOR SOLUTION INTRAMUSCULAR; INTRAVENOUS AS NEEDED
Status: CANCELLED | OUTPATIENT
Start: 2022-02-15

## 2022-02-15 RX ORDER — EPINEPHRINE 1 MG/ML
0.3 INJECTION, SOLUTION, CONCENTRATE INTRAVENOUS AS NEEDED
Status: CANCELLED | OUTPATIENT
Start: 2022-02-15

## 2022-02-15 RX ADMIN — SODIUM CHLORIDE, PRESERVATIVE FREE 10 ML: 5 INJECTION INTRAVENOUS at 11:03

## 2022-02-15 RX ADMIN — SODIUM CHLORIDE, PRESERVATIVE FREE 10 ML: 5 INJECTION INTRAVENOUS at 14:00

## 2022-02-15 RX ADMIN — Medication 6000 MG: at 12:00

## 2022-02-15 RX ADMIN — SODIUM CHLORIDE 25 ML/HR: 9 INJECTION, SOLUTION INTRAVENOUS at 11:04

## 2022-02-15 NOTE — PROGRESS NOTES
Patient arrived ambulatory to infusion area. C3D1 AEGIS completed. Patient tolerated well. PIV removed. Discharged ambulatory. Patient aware of next infusion appt on 2/22.

## 2022-02-22 ENCOUNTER — HOSPITAL ENCOUNTER (OUTPATIENT)
Dept: INFUSION THERAPY | Age: 67
Discharge: HOME | End: 2022-02-22
Payer: MEDICARE

## 2022-02-22 VITALS
BODY MASS INDEX: 32.55 KG/M2 | HEART RATE: 65 BPM | TEMPERATURE: 97.8 F | OXYGEN SATURATION: 97 % | RESPIRATION RATE: 16 BRPM | DIASTOLIC BLOOD PRESSURE: 90 MMHG | WEIGHT: 240 LBS | SYSTOLIC BLOOD PRESSURE: 165 MMHG

## 2022-02-22 DIAGNOSIS — I21.4 NSTEMI (NON-ST ELEVATED MYOCARDIAL INFARCTION) (HCC): Primary | ICD-10-CM

## 2022-02-22 PROCEDURE — 96365 THER/PROPH/DIAG IV INF INIT: CPT

## 2022-02-22 PROCEDURE — 96366 THER/PROPH/DIAG IV INF ADDON: CPT

## 2022-02-22 PROCEDURE — 74011250636 HC RX REV CODE- 250/636: Performed by: INTERNAL MEDICINE

## 2022-02-22 PROCEDURE — 74011000250 HC RX REV CODE- 250: Performed by: INTERNAL MEDICINE

## 2022-02-22 RX ORDER — ALBUTEROL SULFATE 0.83 MG/ML
2.5 SOLUTION RESPIRATORY (INHALATION) AS NEEDED
Status: CANCELLED
Start: 2022-02-22

## 2022-02-22 RX ORDER — SODIUM CHLORIDE 0.9 % (FLUSH) 0.9 %
10 SYRINGE (ML) INJECTION AS NEEDED
Status: DISCONTINUED | OUTPATIENT
Start: 2022-02-22 | End: 2022-02-23 | Stop reason: HOSPADM

## 2022-02-22 RX ORDER — SODIUM CHLORIDE 9 MG/ML
10 INJECTION INTRAMUSCULAR; INTRAVENOUS; SUBCUTANEOUS AS NEEDED
Status: CANCELLED | OUTPATIENT
Start: 2022-02-22

## 2022-02-22 RX ORDER — HYDROCORTISONE SODIUM SUCCINATE 100 MG/2ML
100 INJECTION, POWDER, FOR SOLUTION INTRAMUSCULAR; INTRAVENOUS AS NEEDED
Status: CANCELLED | OUTPATIENT
Start: 2022-02-22

## 2022-02-22 RX ORDER — EPINEPHRINE 1 MG/ML
0.3 INJECTION, SOLUTION, CONCENTRATE INTRAVENOUS AS NEEDED
Status: CANCELLED | OUTPATIENT
Start: 2022-02-22

## 2022-02-22 RX ORDER — SODIUM CHLORIDE 9 MG/ML
25 INJECTION, SOLUTION INTRAVENOUS CONTINUOUS
Status: DISCONTINUED | OUTPATIENT
Start: 2022-02-22 | End: 2022-02-23 | Stop reason: HOSPADM

## 2022-02-22 RX ORDER — HEPARIN 100 UNIT/ML
300-500 SYRINGE INTRAVENOUS AS NEEDED
Status: CANCELLED
Start: 2022-02-22

## 2022-02-22 RX ORDER — DIPHENHYDRAMINE HYDROCHLORIDE 50 MG/ML
50 INJECTION, SOLUTION INTRAMUSCULAR; INTRAVENOUS AS NEEDED
Status: CANCELLED
Start: 2022-02-22

## 2022-02-22 RX ORDER — ONDANSETRON 2 MG/ML
8 INJECTION INTRAMUSCULAR; INTRAVENOUS AS NEEDED
Status: CANCELLED | OUTPATIENT
Start: 2022-02-22

## 2022-02-22 RX ORDER — ACETAMINOPHEN 325 MG/1
650 TABLET ORAL AS NEEDED
Status: CANCELLED
Start: 2022-02-22

## 2022-02-22 RX ORDER — DIPHENHYDRAMINE HYDROCHLORIDE 50 MG/ML
25 INJECTION, SOLUTION INTRAMUSCULAR; INTRAVENOUS AS NEEDED
Status: CANCELLED
Start: 2022-02-22

## 2022-02-22 RX ADMIN — Medication 6000 MG: at 15:47

## 2022-02-22 RX ADMIN — SODIUM CHLORIDE, PRESERVATIVE FREE 10 ML: 5 INJECTION INTRAVENOUS at 18:08

## 2022-02-22 RX ADMIN — SODIUM CHLORIDE, PRESERVATIVE FREE 10 ML: 5 INJECTION INTRAVENOUS at 14:40

## 2022-02-22 RX ADMIN — SODIUM CHLORIDE 25 ML/HR: 9 INJECTION, SOLUTION INTRAVENOUS at 14:40

## 2022-02-22 NOTE — PROGRESS NOTES
Assume care of pt. Report received from [facility]. Hydration stopped after 15 minutes post AEGIS. Iv discontinued. Pt has no future appt with [facility] at this time.

## 2022-02-22 NOTE — PROGRESS NOTES
Arrived to the [facility]. INV XOG201/placebo infusing. Patient tolerated well. Any issues or concerns during appointment: no.  Patient has no future infusion appointments at this time. Report given to [facility], RN.

## 2022-03-18 PROBLEM — Z45.02 ICD (IMPLANTABLE CARDIOVERTER-DEFIBRILLATOR) DISCHARGE: Status: ACTIVE | Noted: 2022-01-30

## 2022-03-18 PROBLEM — R53.83 FATIGUE: Status: ACTIVE | Noted: 2022-01-30

## 2022-03-18 PROBLEM — I20.0 UNSTABLE ANGINA (HCC): Status: ACTIVE | Noted: 2021-12-02

## 2022-03-19 PROBLEM — I21.4 NSTEMI (NON-ST ELEVATED MYOCARDIAL INFARCTION) (HCC): Status: ACTIVE | Noted: 2020-11-12

## 2022-05-31 ENCOUNTER — TELEPHONE (OUTPATIENT)
Dept: CARDIOLOGY CLINIC | Age: 67
End: 2022-05-31

## 2022-05-31 RX ORDER — AMIODARONE HYDROCHLORIDE 200 MG/1
200 TABLET ORAL DAILY
Qty: 90 TABLET | Refills: 2 | Status: SHIPPED | OUTPATIENT
Start: 2022-05-31

## 2022-05-31 NOTE — TELEPHONE ENCOUNTER
Requested Prescriptions     Signed Prescriptions Disp Refills    amiodarone (CORDARONE) 200 MG tablet 90 tablet 2     Sig: Take 1 tablet by mouth daily     Authorizing Provider: Nick Escobar     Ordering User: Ron Peters     Informed patient Rx sent to pharmacy.

## 2022-05-31 NOTE — TELEPHONE ENCOUNTER
Please call in new rx for Amiodarone  [address] to [facility] in [place] ,pt ask to please call in asap because he is out of meds

## 2022-05-31 NOTE — TELEPHONE ENCOUNTER
Patient called back to make sure his RX for:    amiodarone (CORDARONE) 200 MG tablet      [address]  [place], [address]    Please call patient.

## 2022-07-20 ENCOUNTER — RESEARCH ENCOUNTER (OUTPATIENT)
Dept: CARDIOLOGY CLINIC | Age: 67
End: 2022-07-20

## 2022-07-20 VITALS
WEIGHT: 244 LBS | DIASTOLIC BLOOD PRESSURE: 67 MMHG | HEART RATE: 59 BPM | BODY MASS INDEX: 33.09 KG/M2 | SYSTOLIC BLOOD PRESSURE: 108 MMHG

## 2022-07-20 NOTE — PROGRESS NOTES
[facility] Do Trial  Site [id number]  Subject 009  Date 07/20/22          Visit 9      Patient here today for Visit 9 of the AEGIS II Trial.  The AEGIS II trial purpose, design, risks/benefits, and alternatives were reviewed with the patient. Patient was reminded that participation is voluntary and that he can withdraw from the study at any time. Patient verbalized understanding of the above information and expressed his desire to continue participation in the main study as well as the associated sub studies. Patient denies any questions or concerns regarding the study at this time. Protocol required questions were discussed and answered. Patient reported the following AEs/SAEs: 1) Shingles with start date 6/26/2022 and is still ongoing. 2) Tooth Abscess with start date 6/8/22 and stop date 6/8/22. Conmed changes since last study visit:  Added Valium 10mg po daily prn. Patient took one round of Valtrex 1000 mg tid x 7 days from 7/7-14/2022    Vital Signs:  Blood Pressure: 108/67  HR: 59  Weight 110 kg     Patient reports that he has shingles. He has had one round of Valtrex but is still experiencing pain from the shingles. He is going to follow up with his PCP for this. He had a routine follow up with his urologist and his PSA is stable. He denied any other complaints or concerns today. His next visit is scheduled for the end of October by phone. He was encouraged to call us with any questions or concerns.

## 2022-07-27 PROCEDURE — 93296 REM INTERROG EVL PM/IDS: CPT | Performed by: INTERNAL MEDICINE

## 2022-07-27 PROCEDURE — 93295 DEV INTERROG REMOTE 1/2/MLT: CPT | Performed by: INTERNAL MEDICINE

## 2022-08-04 ENCOUNTER — APPOINTMENT (OUTPATIENT)
Dept: CT IMAGING | Age: 67
End: 2022-08-04
Payer: MEDICARE

## 2022-08-04 ENCOUNTER — HOSPITAL ENCOUNTER (EMERGENCY)
Dept: GENERAL RADIOLOGY | Age: 67
Discharge: HOME | End: 2022-08-07
Payer: MEDICARE

## 2022-08-04 ENCOUNTER — HOSPITAL ENCOUNTER (OUTPATIENT)
Age: 67
Setting detail: OBSERVATION
Discharge: HOME | End: 2022-08-06
Attending: EMERGENCY MEDICINE | Admitting: INTERNAL MEDICINE
Payer: MEDICARE

## 2022-08-04 DIAGNOSIS — I25.110 CORONARY ARTERY DISEASE INVOLVING NATIVE CORONARY ARTERY OF NATIVE HEART WITH UNSTABLE ANGINA PECTORIS (HCC): ICD-10-CM

## 2022-08-04 DIAGNOSIS — I20.0 UNSTABLE ANGINA PECTORIS (HCC): Primary | ICD-10-CM

## 2022-08-04 DIAGNOSIS — R07.9 CHEST PAIN: ICD-10-CM

## 2022-08-04 LAB
ALBUMIN SERPL-MCNC: 3.7 G/DL (ref 3.2–4.6)
ALBUMIN/GLOB SERPL: 0.9 {RATIO} (ref 1.2–3.5)
ALP SERPL-CCNC: 67 U/L (ref 50–136)
ALT SERPL-CCNC: 26 U/L (ref 12–65)
ANION GAP SERPL CALC-SCNC: 7 MMOL/L (ref 7–16)
AST SERPL-CCNC: 19 U/L (ref 15–37)
BASOPHILS # BLD: 0.1 K/UL (ref 0–0.2)
BASOPHILS NFR BLD: 1 % (ref 0–2)
BILIRUB SERPL-MCNC: 0.3 MG/DL (ref 0.2–1.1)
BUN SERPL-MCNC: 22 MG/DL (ref 8–23)
CALCIUM SERPL-MCNC: 9.2 MG/DL (ref 8.3–10.4)
CHLORIDE SERPL-SCNC: 111 MMOL/L (ref 98–107)
CO2 SERPL-SCNC: 22 MMOL/L (ref 21–32)
CREAT SERPL-MCNC: 1.6 MG/DL (ref 0.8–1.5)
D DIMER PPP FEU-MCNC: 1.66 UG/ML(FEU)
DIFFERENTIAL METHOD BLD: NORMAL
EKG ATRIAL RATE: 62 BPM
EKG DIAGNOSIS: NORMAL
EKG P-R INTERVAL: 168 MS
EKG Q-T INTERVAL: 416 MS
EKG QRS DURATION: 106 MS
EKG QTC CALCULATION (BAZETT): 422 MS
EKG R AXIS: 83 DEGREES
EKG T AXIS: 264 DEGREES
EKG VENTRICULAR RATE: 62 BPM
EOSINOPHIL # BLD: 0.4 K/UL (ref 0–0.8)
EOSINOPHIL NFR BLD: 4 % (ref 0.5–7.8)
ERYTHROCYTE [DISTWIDTH] IN BLOOD BY AUTOMATED COUNT: 13.6 % (ref 11.9–14.6)
GLOBULIN SER CALC-MCNC: 3.9 G/DL (ref 2.3–3.5)
GLUCOSE SERPL-MCNC: 154 MG/DL (ref 65–100)
HCT VFR BLD AUTO: 48.3 % (ref 41.1–50.3)
HGB BLD-MCNC: 15.7 G/DL (ref 13.6–17.2)
IMM GRANULOCYTES # BLD AUTO: 0.1 K/UL (ref 0–0.5)
IMM GRANULOCYTES NFR BLD AUTO: 1 % (ref 0–5)
LYMPHOCYTES # BLD: 2 K/UL (ref 0.5–4.6)
LYMPHOCYTES NFR BLD: 22 % (ref 13–44)
MAGNESIUM SERPL-MCNC: 2.2 MG/DL (ref 1.8–2.4)
MCH RBC QN AUTO: 29.4 PG (ref 26.1–32.9)
MCHC RBC AUTO-ENTMCNC: 32.5 G/DL (ref 31.4–35)
MCV RBC AUTO: 90.4 FL (ref 79.6–97.8)
MONOCYTES # BLD: 0.6 K/UL (ref 0.1–1.3)
MONOCYTES NFR BLD: 6 % (ref 4–12)
NEUTS SEG # BLD: 6 K/UL (ref 1.7–8.2)
NEUTS SEG NFR BLD: 66 % (ref 43–78)
NRBC # BLD: 0 K/UL (ref 0–0.2)
NT PRO BNP: 360 PG/ML (ref 5–125)
PLATELET # BLD AUTO: 223 K/UL (ref 150–450)
PMV BLD AUTO: 9.4 FL (ref 9.4–12.3)
POTASSIUM SERPL-SCNC: 4.5 MMOL/L (ref 3.5–5.1)
PROT SERPL-MCNC: 7.6 G/DL (ref 6.3–8.2)
RBC # BLD AUTO: 5.34 M/UL (ref 4.23–5.6)
SODIUM SERPL-SCNC: 140 MMOL/L (ref 138–145)
TROPONIN I SERPL HS-MCNC: 131.7 PG/ML (ref 0–14)
TROPONIN I SERPL HS-MCNC: 62.5 PG/ML (ref 0–14)
TROPONIN I SERPL HS-MCNC: 94.5 PG/ML (ref 0–14)
WBC # BLD AUTO: 9.1 K/UL (ref 4.3–11.1)

## 2022-08-04 PROCEDURE — 83880 ASSAY OF NATRIURETIC PEPTIDE: CPT

## 2022-08-04 PROCEDURE — 71260 CT THORAX DX C+: CPT | Performed by: EMERGENCY MEDICINE

## 2022-08-04 PROCEDURE — 2580000003 HC RX 258: Performed by: EMERGENCY MEDICINE

## 2022-08-04 PROCEDURE — 71046 X-RAY EXAM CHEST 2 VIEWS: CPT

## 2022-08-04 PROCEDURE — 85025 COMPLETE CBC W/AUTO DIFF WBC: CPT

## 2022-08-04 PROCEDURE — 96361 HYDRATE IV INFUSION ADD-ON: CPT

## 2022-08-04 PROCEDURE — 84484 ASSAY OF TROPONIN QUANT: CPT

## 2022-08-04 PROCEDURE — 6360000002 HC RX W HCPCS: Performed by: EMERGENCY MEDICINE

## 2022-08-04 PROCEDURE — 83735 ASSAY OF MAGNESIUM: CPT

## 2022-08-04 PROCEDURE — 2580000003 HC RX 258: Performed by: PHYSICIAN ASSISTANT

## 2022-08-04 PROCEDURE — 93005 ELECTROCARDIOGRAM TRACING: CPT | Performed by: PHYSICIAN ASSISTANT

## 2022-08-04 PROCEDURE — 80053 COMPREHEN METABOLIC PANEL: CPT

## 2022-08-04 PROCEDURE — 96375 TX/PRO/DX INJ NEW DRUG ADDON: CPT

## 2022-08-04 PROCEDURE — 6360000004 HC RX CONTRAST MEDICATION: Performed by: EMERGENCY MEDICINE

## 2022-08-04 PROCEDURE — 85379 FIBRIN DEGRADATION QUANT: CPT

## 2022-08-04 PROCEDURE — 99213 OFFICE O/P EST LOW 20 MIN: CPT | Performed by: INTERNAL MEDICINE

## 2022-08-04 PROCEDURE — G0378 HOSPITAL OBSERVATION PER HR: HCPCS

## 2022-08-04 PROCEDURE — 99285 EMERGENCY DEPT VISIT HI MDM: CPT

## 2022-08-04 PROCEDURE — 96374 THER/PROPH/DIAG INJ IV PUSH: CPT

## 2022-08-04 RX ORDER — CARVEDILOL 25 MG/1
12.5 TABLET ORAL 2 TIMES DAILY WITH MEALS
Status: DISCONTINUED | OUTPATIENT
Start: 2022-08-04 | End: 2022-08-06 | Stop reason: HOSPADM

## 2022-08-04 RX ORDER — ONDANSETRON 2 MG/ML
4 INJECTION INTRAMUSCULAR; INTRAVENOUS EVERY 6 HOURS PRN
Status: DISCONTINUED | OUTPATIENT
Start: 2022-08-04 | End: 2022-08-06 | Stop reason: HOSPADM

## 2022-08-04 RX ORDER — DIAZEPAM 2 MG/1
5 TABLET ORAL EVERY 8 HOURS PRN
Status: DISCONTINUED | OUTPATIENT
Start: 2022-08-04 | End: 2022-08-06 | Stop reason: HOSPADM

## 2022-08-04 RX ORDER — SODIUM CHLORIDE 0.9 % (FLUSH) 0.9 %
10 SYRINGE (ML) INJECTION
Status: COMPLETED | OUTPATIENT
Start: 2022-08-04 | End: 2022-08-04

## 2022-08-04 RX ORDER — ASPIRIN 81 MG/1
81 TABLET ORAL DAILY
Status: DISCONTINUED | OUTPATIENT
Start: 2022-08-04 | End: 2022-08-06 | Stop reason: HOSPADM

## 2022-08-04 RX ORDER — ONDANSETRON 4 MG/1
4 TABLET, ORALLY DISINTEGRATING ORAL EVERY 8 HOURS PRN
Status: DISCONTINUED | OUTPATIENT
Start: 2022-08-04 | End: 2022-08-06 | Stop reason: HOSPADM

## 2022-08-04 RX ORDER — CLOPIDOGREL BISULFATE 75 MG/1
75 TABLET ORAL DAILY
Status: DISCONTINUED | OUTPATIENT
Start: 2022-08-04 | End: 2022-08-06 | Stop reason: HOSPADM

## 2022-08-04 RX ORDER — FENOFIBRATE 160 MG/1
160 TABLET ORAL DAILY
Status: DISCONTINUED | OUTPATIENT
Start: 2022-08-04 | End: 2022-08-06 | Stop reason: HOSPADM

## 2022-08-04 RX ORDER — ACETAMINOPHEN 650 MG/1
650 SUPPOSITORY RECTAL EVERY 6 HOURS PRN
Status: DISCONTINUED | OUTPATIENT
Start: 2022-08-04 | End: 2022-08-06 | Stop reason: HOSPADM

## 2022-08-04 RX ORDER — OMEPRAZOLE 40 MG/1
40 CAPSULE, DELAYED RELEASE ORAL DAILY
Status: DISCONTINUED | OUTPATIENT
Start: 2022-08-04 | End: 2022-08-04 | Stop reason: CLARIF

## 2022-08-04 RX ORDER — SODIUM CHLORIDE 0.9 % (FLUSH) 0.9 %
5-40 SYRINGE (ML) INJECTION EVERY 12 HOURS SCHEDULED
Status: DISCONTINUED | OUTPATIENT
Start: 2022-08-04 | End: 2022-08-06 | Stop reason: HOSPADM

## 2022-08-04 RX ORDER — LISINOPRIL 20 MG/1
20 TABLET ORAL EVERY 12 HOURS
Status: DISCONTINUED | OUTPATIENT
Start: 2022-08-04 | End: 2022-08-06 | Stop reason: HOSPADM

## 2022-08-04 RX ORDER — POLYETHYLENE GLYCOL 3350 17 G/17G
17 POWDER, FOR SOLUTION ORAL DAILY PRN
Status: DISCONTINUED | OUTPATIENT
Start: 2022-08-04 | End: 2022-08-06 | Stop reason: HOSPADM

## 2022-08-04 RX ORDER — SODIUM CHLORIDE 9 MG/ML
INJECTION, SOLUTION INTRAVENOUS PRN
Status: DISCONTINUED | OUTPATIENT
Start: 2022-08-04 | End: 2022-08-06 | Stop reason: HOSPADM

## 2022-08-04 RX ORDER — 0.9 % SODIUM CHLORIDE 0.9 %
100 INTRAVENOUS SOLUTION INTRAVENOUS
Status: COMPLETED | OUTPATIENT
Start: 2022-08-04 | End: 2022-08-04

## 2022-08-04 RX ORDER — ATORVASTATIN CALCIUM 40 MG/1
40 TABLET, FILM COATED ORAL NIGHTLY
Status: DISCONTINUED | OUTPATIENT
Start: 2022-08-04 | End: 2022-08-06 | Stop reason: HOSPADM

## 2022-08-04 RX ORDER — SODIUM CHLORIDE 0.9 % (FLUSH) 0.9 %
5-40 SYRINGE (ML) INJECTION PRN
Status: DISCONTINUED | OUTPATIENT
Start: 2022-08-04 | End: 2022-08-06 | Stop reason: HOSPADM

## 2022-08-04 RX ORDER — SODIUM CHLORIDE 9 MG/ML
INJECTION, SOLUTION INTRAVENOUS CONTINUOUS
Status: DISCONTINUED | OUTPATIENT
Start: 2022-08-04 | End: 2022-08-06 | Stop reason: HOSPADM

## 2022-08-04 RX ORDER — HYDROCODONE BITARTRATE AND ACETAMINOPHEN 10; 325 MG/1; MG/1
1 TABLET ORAL EVERY 4 HOURS PRN
Status: DISCONTINUED | OUTPATIENT
Start: 2022-08-04 | End: 2022-08-06 | Stop reason: HOSPADM

## 2022-08-04 RX ORDER — ONDANSETRON 2 MG/ML
4 INJECTION INTRAMUSCULAR; INTRAVENOUS
Status: COMPLETED | OUTPATIENT
Start: 2022-08-04 | End: 2022-08-04

## 2022-08-04 RX ORDER — 0.9 % SODIUM CHLORIDE 0.9 %
1000 INTRAVENOUS SOLUTION INTRAVENOUS
Status: COMPLETED | OUTPATIENT
Start: 2022-08-04 | End: 2022-08-04

## 2022-08-04 RX ORDER — AMIODARONE HYDROCHLORIDE 200 MG/1
200 TABLET ORAL DAILY
Status: DISCONTINUED | OUTPATIENT
Start: 2022-08-04 | End: 2022-08-06 | Stop reason: HOSPADM

## 2022-08-04 RX ORDER — ACETAMINOPHEN 325 MG/1
650 TABLET ORAL EVERY 6 HOURS PRN
Status: DISCONTINUED | OUTPATIENT
Start: 2022-08-04 | End: 2022-08-06 | Stop reason: HOSPADM

## 2022-08-04 RX ORDER — MORPHINE SULFATE 4 MG/ML
4 INJECTION INTRAVENOUS ONCE
Status: COMPLETED | OUTPATIENT
Start: 2022-08-04 | End: 2022-08-04

## 2022-08-04 RX ORDER — DIPHENHYDRAMINE HCL 25 MG
25 CAPSULE ORAL EVERY 6 HOURS PRN
Status: DISCONTINUED | OUTPATIENT
Start: 2022-08-04 | End: 2022-08-06 | Stop reason: HOSPADM

## 2022-08-04 RX ADMIN — ONDANSETRON 4 MG: 2 INJECTION INTRAMUSCULAR; INTRAVENOUS at 14:40

## 2022-08-04 RX ADMIN — SODIUM CHLORIDE, PRESERVATIVE FREE 10 ML: 5 INJECTION INTRAVENOUS at 14:23

## 2022-08-04 RX ADMIN — MORPHINE SULFATE 4 MG: 4 INJECTION INTRAVENOUS at 14:40

## 2022-08-04 RX ADMIN — SODIUM CHLORIDE: 9 INJECTION, SOLUTION INTRAVENOUS at 23:15

## 2022-08-04 RX ADMIN — SODIUM CHLORIDE 100 ML: 9 INJECTION, SOLUTION INTRAVENOUS at 14:23

## 2022-08-04 RX ADMIN — SODIUM CHLORIDE 1000 ML: 9 INJECTION, SOLUTION INTRAVENOUS at 14:39

## 2022-08-04 RX ADMIN — IOPAMIDOL 75 ML: 755 INJECTION, SOLUTION INTRAVENOUS at 14:23

## 2022-08-04 ASSESSMENT — ENCOUNTER SYMPTOMS
SHORTNESS OF BREATH: 1
CHEST TIGHTNESS: 0

## 2022-08-04 ASSESSMENT — PAIN SCALES - GENERAL: PAINLEVEL_OUTOF10: 9

## 2022-08-04 ASSESSMENT — PAIN - FUNCTIONAL ASSESSMENT: PAIN_FUNCTIONAL_ASSESSMENT: 0-10

## 2022-08-04 NOTE — Clinical Note
Prepped: right groin and left radial. Site was clipped and prepped. Prepped with: ChloraPrep. Patient was draped after wet prep solution dried.

## 2022-08-04 NOTE — ED TRIAGE NOTES
Patient ambulatory to triage with mask in place. Patient reports chest pain and shob x 3 days. Pt reports pain to right calf and headache.

## 2022-08-04 NOTE — ED PROVIDER NOTES
associated shortness of breath. The pain is worse with exertion. He has been taking Norco with no significant relief. He does have nitroglycerin but cannot find it so he is taken none for this and has also taken aspirin at home. The history is provided by the patient and medical records. Review of Systems   Constitutional:  Negative for chills and fever. Respiratory:  Positive for shortness of breath. Negative for chest tightness. Cardiovascular:  Positive for chest pain. Negative for palpitations and leg swelling. All other systems reviewed and are negative. Past Medical History:   Diagnosis Date    AAA (abdominal aortic aneurysm) ([address] 9/23/2011 9/29/11 AORTIC ABDOMINAL ANUERYSM REPAIR ENDOVASCULAR (EVAR)-  / Hao 4.6cm on US 9/23/11     Acute myocardial infarction [facility]) February, 2010    MI 2/10 - Quad bypass 2/25/10 ,4/2016    Anticoagulation monitoring, INR range 2-3 10/5/2011    Anxiety 11/3/2011    Arthritis 11/3/2011    CAD (coronary artery disease)     CAD (coronary artery disease), native coronary artery 2/24/2010    Cardiomyopathy ([address] 2/24/2010    Chest discomfort 06/16/15    Tightness, Pressure. Jan 2014:  stress MPI with Lexiscan - normal perfusion, LVEF 52%    Chronic systolic heart failure ([address] 2/24/2010    Claudication ([address] 06/16/15    Claudication, symptom, pain relieved by rest    Congestive heart failure, unspecified     COPD (chronic obstructive pulmonary disease) ([address] 11/3/2011    Extremity atherosclerosis with resting pain ([address] 9/23/2011 9/28/11 ; Kassy Ramos- Dr. Molly Mendoza     GERD (gastroesophageal reflux disease) 11/3/2011    History of AAA (abdominal aortic aneurysm) repair ? ??    including right femoral artery    Hypercholesterolemia     Hyperlipidemia 11/3/2011    Hypertension 11/3/2011    Popliteal artery aneurysm, bilateral ([address] 9/23/2011    L Pop A aneurysm stent graft 9/8/11-  Moccia R Pop A aneurysm repair      Psychiatric disorder     anxiety    PUD (peptic ulcer disease) ? ??    hx bleeding gastric ulcers     PVD (peripheral vascular disease) ([facility] 11/3/2011    S/P AAA (abdominal aortic aneurysm) repair 12/11/2013    EVAR    S/P CABG (coronary artery bypass graft) 4/26/2016    Tobacco abuse 4/23/2014    Weight loss, unintentional 9/23/2011        Past Surgical History:   Procedure Laterality Date    ABDOMINAL AORTIC ANEURYSM REPAIR      10/2011    BYPASS GRAFT OTHR,FEM-POP  03/18/2011    right leg    BYPASS GRAFT OTHR,FEM-TIBIAL  2012    LLE bypass pop aneurysm    CABG, ARTERY-VEIN, FIVE      2010    CABG, ARTERY-VEIN, FOUR  2010    CHOLECYSTECTOMY  2012    FEMORAL BYPASS      Left bypass stent graft    LAP,CHOLECYSTECTOMY      09/06/11        Family History   Problem Relation Age of Onset    Cancer Paternal Uncle     Diabetes Brother     Diabetes Maternal Uncle     Hypertension Maternal Uncle     Heart Disease Maternal Grandfather     Hypertension Mother     Heart Disease Mother     Cancer Maternal Grandmother         Social History     Socioeconomic History    Marital status:    Tobacco Use    Smoking status: Every Day     Packs/day: 0.50     Types: Cigarettes    Smokeless tobacco: Never   Substance and Sexual Activity    Alcohol use: No    Drug use: Yes     Types: Marijuana Katerine Santana), Prescription         Bee venom and Codeine     Previous Medications    ACETAMINOPHEN (TYLENOL) 500 MG TABLET    Take by mouth every 6 hours as needed    AMIODARONE (CORDARONE) 200 MG TABLET    Take 1 tablet by mouth daily    ASPIRIN 81 MG EC TABLET    Take by mouth daily    ATORVASTATIN (LIPITOR) 40 MG TABLET    Take by mouth daily    CARVEDILOL (COREG) 12.5 MG TABLET    Take 12.5 mg by mouth 2 times daily (with meals)    CLOPIDOGREL (PLAVIX) 75 MG TABLET    Take 75 mg by mouth daily    DIAZEPAM (VALIUM) 5 MG TABLET    Take 5 mg by mouth.     DIPHENHYDRAMINE (BENADRYL) 25 MG CAPSULE    Take 25 mg by mouth every 6 hours as needed    FENOFIBRATE (TRIGLIDE) 160 MG TABLET    TAKE 1 TABLET BY MOUTH ONCE DAILY    HYDROCODONE-ACETAMINOPHEN (NORCO)  MG PER TABLET    Take 1 tablet by mouth every 4 hours as needed. LISINOPRIL (PRINIVIL;ZESTRIL) 20 MG TABLET    Take 20 mg by mouth every 12 hours    OMEPRAZOLE (PRILOSEC) 40 MG DELAYED RELEASE CAPSULE    Take 40 mg by mouth daily        Vitals signs and nursing note reviewed. Patient Vitals for the past 4 hrs:   Temp Pulse Resp BP SpO2   08/04/22 1152 98.2 °F (36.8 °C) 61 18 (!) 142/89 96 %          Physical Exam  Vitals and nursing note reviewed. Constitutional:       General: He is not in acute distress. Appearance: Normal appearance. He is not ill-appearing, toxic-appearing or diaphoretic. HENT:      Head: Normocephalic and atraumatic. Eyes:      Extraocular Movements: Extraocular movements intact. Conjunctiva/sclera: Conjunctivae normal.      Pupils: Pupils are equal, round, and reactive to light. Cardiovascular:      Rate and Rhythm: Normal rate and regular rhythm. Pulses: Normal pulses. Heart sounds: Normal heart sounds. Pulmonary:      Effort: Pulmonary effort is normal.      Breath sounds: Normal breath sounds. Abdominal:      General: There is no distension. Palpations: Abdomen is soft. Tenderness: There is no abdominal tenderness. Musculoskeletal:         General: Normal range of motion. Cervical back: Normal range of motion and neck supple. Skin:     General: Skin is warm and dry. Capillary Refill: Capillary refill takes less than 2 seconds. Neurological:      General: No focal deficit present. Mental Status: He is alert and oriented to person, place, and time. Mental status is at baseline.    Psychiatric:         Mood and Affect: Mood normal.         Behavior: Behavior normal.        Procedures      Labs Reviewed   COMPREHENSIVE METABOLIC PANEL - Abnormal; Notable for the following components:       Result Value    Chloride 111 (*)     Glucose 154 (*)     Creatinine 1.60 (*)     GFR  56 (*)     GFR Non- 46 (*)     Globulin 3.9 (*)     Albumin/Globulin Ratio 0.9 (*)     All other components within normal limits   TROPONIN - Abnormal; Notable for the following components:    Troponin, High Sensitivity 62.5 (*)     All other components within normal limits   CBC WITH AUTO DIFFERENTIAL   MAGNESIUM   BRAIN NATRIURETIC PEPTIDE   D-DIMER, QUANTITATIVE        XR CHEST (2 VW)   Final Result   No radiographic evidence of acute cardiopulmonary disease. CT CHEST PULMONARY EMBOLISM W CONTRAST    (Results Pending)                          Voice dictation software was used during the making of this note. This software is not perfect and grammatical and other typographical errors may be present. This note has not been completely proofread for errors.      Natalia Reddy DO  08/04/22 [id number]

## 2022-08-04 NOTE — H&P
Patient seen and examined by me. Agree with above note by physician extender. Key findings are: 56-year-old male with history of complex coronary artery disease status post coronary bypass grafting and recent PCI and stenting in January/2022. He has established ischemic cardiomyopathy status post ICD with history of VT VF on chronic amiodarone. He presents today with symptoms worrisome for unstable angina. Vitals:    08/04/22 1152 08/04/22 1328   BP: (!) 142/89 116/75   Pulse: 61 67   Resp: 18 20   Temp: 98.2 °F (36.8 °C)    TempSrc: Oral    SpO2: 96% 96%   Weight: 244 lb (110.7 kg)    Height: 6' (1.829 m)         General: Patient is alert and oriented, no apparent distress  HEENT: Pupils equal reactive, oropharynx clear  Chest: Clear to auscultation bilaterally  Cardiovascular: S1-S2 regular with no murmur  Abdomen: Soft positive bowel sounds  Extremities: Soft no edema with intact distal pulses    Principal Problem:    Unstable angina (HCC)  Plan: Patient presents with recurrent chest pain syndrome indicative of unstable angina. Plan to proceed with cardiac catheterization. Risk benefits of procedure discussed the patient is willing to proceed. CTA of the chest demonstrates no evidence of pulmonary embolus or aortic dissection. Chi Agarwal MD            [facility] Cardiology Initial Cardiac Evaluation      Date of  Admission: 8/4/2022 12:55 PM     Primary Care Physician: Sathish Mcgrath DO  Primary Cardiologist:  [facility]  Referring Physician: Dr Guillaume Marcelo  Supervising Physician: Dr Latrice Orozco    CC/Reason for evaluation: chest pain    HPI:  Terrence Barreto is a 61 y.o. male with prior history of  CAD with prior CABG x 4, ischemic cardiomyopathy s/p ICD, sHF, VF/VT on amiodarone, HTN, HLD, COPD, AAA s/p repair, PAD s/p intervention, GERD and tobacco abuse who presented to [facility] ED on 8/4 with complaint of chest pain.  Patient reports several week history of chest pain but worse over the last 3 days. Describes chest pain as substernal and pressure-like. States he feels like someone is standing on his chest. Has associated SOB and diaphoresis. Pain occurs with exertional activities and usually improves with rest. In ED, /89. Labs showed WBC 9.1, H/H 15.7/48.3, Ptl 223, Na 140, K 4.5, BUN/Cr 22/1.60, pBNP 360, hs trop 62.5-94.5, d dimer 1.66. CXR no acute cardiopulmonary process. CT chest negative for pulmonary embolism. Cardiology consulted for further evaluation. Patient reports holding ASA and Plavix for 3 days last month for oral surgery procedure. Past Medical History:   Diagnosis Date    AAA (abdominal aortic aneurysm) ([address] 9/23/2011 9/29/11 AORTIC ABDOMINAL ANUERYSM REPAIR ENDOVASCULAR (EVAR)-  Monique Bui 4.6cm on US 9/23/11     Acute myocardial infarction [facility]) February, 2010    MI 2/10 - Quad bypass 2/25/10 ,4/2016    Anticoagulation monitoring, INR range 2-3 10/5/2011    Anxiety 11/3/2011    Arthritis 11/3/2011    CAD (coronary artery disease)     CAD (coronary artery disease), native coronary artery 2/24/2010    Cardiomyopathy ([address] 2/24/2010    Chest discomfort 06/16/15    Tightness, Pressure. Jan 2014:  stress MPI with Lexiscan - normal perfusion, LVEF 52%    Chronic systolic heart failure ([address] 2/24/2010    Claudication ([address] 06/16/15    Claudication, symptom, pain relieved by rest    Congestive heart failure, unspecified     COPD (chronic obstructive pulmonary disease) ([address] 11/3/2011    Extremity atherosclerosis with resting pain ([address] 9/23/2011 9/28/11 ; Arie Avendano- Dr. Anthony Enriquez     GERD (gastroesophageal reflux disease) 11/3/2011    History of AAA (abdominal aortic aneurysm) repair ? ??    including right femoral artery    Hypercholesterolemia     Hyperlipidemia 11/3/2011    Hypertension 11/3/2011    Popliteal artery aneurysm, bilateral ([address] 9/23/2011    L Pop A aneurysm stent graft 9/8/11- Dr. Anthony Enriquez R Pop A aneurysm repair      Psychiatric disorder     anxiety    PUD (peptic ulcer disease) ? ??    hx bleeding gastric ulcers     PVD (peripheral vascular disease) ([facility] 11/3/2011    S/P AAA (abdominal aortic aneurysm) repair 12/11/2013    EVAR    S/P CABG (coronary artery bypass graft) 4/26/2016    Tobacco abuse 4/23/2014    Weight loss, unintentional 9/23/2011      Past Surgical History:   Procedure Laterality Date    ABDOMINAL AORTIC ANEURYSM REPAIR      10/2011    BYPASS GRAFT OTHR,FEM-POP  03/18/2011    right leg    BYPASS GRAFT OTHR,FEM-TIBIAL  2012    LLE bypass pop aneurysm    CABG, ARTERY-VEIN, FIVE      2010    CABG, ARTERY-VEIN, FOUR  2010    CHOLECYSTECTOMY  2012    FEMORAL BYPASS      Left bypass stent graft    LAP,CHOLECYSTECTOMY      09/06/11       Allergies   Allergen Reactions    Bee Venom Angioedema    Codeine Rash      Social History     Socioeconomic History    Marital status:      Spouse name: Not on file    Number of children: Not on file    Years of education: Not on file    Highest education level: Not on file   Occupational History    Not on file   Tobacco Use    Smoking status: Every Day     Packs/day: 0.50     Types: Cigarettes    Smokeless tobacco: Never   Substance and Sexual Activity    Alcohol use: No    Drug use: Yes     Types: Marijuana Lucianne Bars), Prescription    Sexual activity: Not on file   Other Topics Concern    Not on file   Social History Narrative    Not on file     Social Determinants of Health     Financial Resource Strain: Not on file   Food Insecurity: Not on file   Transportation Needs: Not on file   Physical Activity: Not on file   Stress: Not on file   Social Connections: Not on file   Intimate Partner Violence: Not on file   Housing Stability: Not on file     Social History       Tobacco History       Smoking Status  Every Day Smoking Frequency  0.50 packs/day Smoking Tobacco Type  Cigarettes      Smokeless Tobacco Use  Never              Alcohol History Alcohol Use Status  No              Drug Use       Drug Use Status  Yes Types  Marijuana Hulon King), Prescription              Sexual Activity       Sexually Active  Not Asked                    Family History   Problem Relation Age of Onset    Cancer Paternal Uncle     Diabetes Brother     Diabetes Maternal Uncle     Hypertension Maternal Uncle     Heart Disease Maternal Grandfather     Hypertension Mother     Heart Disease Mother     Cancer Maternal Grandmother         No current facility-administered medications for this encounter. Current Outpatient Medications   Medication Sig    amiodarone (CORDARONE) 200 MG tablet Take 1 tablet by mouth daily    acetaminophen (TYLENOL) 500 MG tablet Take by mouth every 6 hours as needed    aspirin 81 MG EC tablet Take by mouth daily    atorvastatin (LIPITOR) 40 MG tablet Take by mouth daily    carvedilol (COREG) 12.5 MG tablet Take 12.5 mg by mouth 2 times daily (with meals)    clopidogrel (PLAVIX) 75 MG tablet Take 75 mg by mouth daily    diazePAM (VALIUM) 5 MG tablet Take 5 mg by mouth. diphenhydrAMINE (BENADRYL) 25 MG capsule Take 25 mg by mouth every 6 hours as needed    fenofibrate (TRIGLIDE) 160 MG tablet TAKE 1 TABLET BY MOUTH ONCE DAILY    HYDROcodone-acetaminophen (NORCO)  MG per tablet Take 1 tablet by mouth every 4 hours as needed. lisinopril (PRINIVIL;ZESTRIL) 20 MG tablet Take 20 mg by mouth every 12 hours    omeprazole (PRILOSEC) 40 MG delayed release capsule Take 40 mg by mouth daily       Review of Symptoms:    General: Positive for generalized weakness/fatigue. No weight changes, fever or chills  Skin: no rashes, lumps, or other skin changes  HEENT: no headache, dizziness, lightheadedness, vision changes, hearing changes, tinnitus, vertigo, sinus pressure/pain, bleeding gums, sore throat, or hoarseness  Neck: no swollen glands, goiter, pain or stiffness  Respiratory: Positive for dyspnea.  No cough, sputum, hemoptysis, wheezing  Cardiovascular: + as per HPI  Gastrointestinal: Positive for GERD. No constipation, diarrhea, liver problems, or h/o GI bleed  Urinary: no frequency, urgency , hematuria, burning/pain with urination, recent flank pain, polyuria, nocturia, or difficulty urinating  Peripheral Vascular: no claudication, leg cramps, prior DVTs, swelling of calves, legs, or feet, color change, or swelling with redness or tenderness  Musculoskeletal: no muscle or joint pain/stiffness, joint swelling, erythema of joints, or back pain  Psychiatric: no depression or excessive stress  Neurological: no sensory or motor loss, seizures, syncope, tremors, numbness, no dementia  Hematologic: no anemia, easy bruising or bleeding  Endocrine: No thyroid problems, heat or cold intolerance, excessive sweating, polyuria, polydipsia, diabetes.        Subjective:     Physical Exam:    Vitals:    08/04/22 1152 08/04/22 1328   BP: (!) 142/89 116/75   Pulse: 61 67   Resp: 18 20   Temp: 98.2 °F (36.8 °C)    TempSrc: Oral    SpO2: 96% 96%   Weight: 244 lb (110.7 kg)    Height: 6' (1.829 m)      General: Well Developed, Well Nourished, No Acute Distress  HEENT: pupils equal and round, no abnormalities noted  Neck: supple, no JVD, no carotid bruits  Heart: S1S2 with RRR without murmurs or gallops  Lungs: Diminished   Abd: soft, nontender, nondistended, with good bowel sounds  Ext: warm, no edema, calves supple/nontender, pulses 2+ bilaterally  Skin: warm and dry  Psychiatric: Normal mood and affect  Neurologic: Alert and oriented X 3      Labs:     Recent Labs     08/04/22  1200   WBC 9.1   HGB 15.7   HCT 48.3   MCV 90.4        Lab Results   Component Value Date    WBC 9.1 08/04/2022    HGB 15.7 08/04/2022    HCT 48.3 08/04/2022     08/04/2022    CHOL 245 (H) 01/30/2022    TRIG 215 (H) 01/30/2022    HDL 26 (L) 01/30/2022    ALT 26 08/04/2022    AST 19 08/04/2022     08/04/2022    K 4.5 08/04/2022     (H) 08/04/2022    CREATININE 1.60 (H) 08/04/2022    BUN 22 08/04/2022    CO2 22 08/04/2022    TSH 1.110 01/29/2022    INR 1.0 01/29/2022        Recent Labs     08/04/22  1200   DDIMER 1.66*     Pt has been seen and examined by Dr. Lowell Copeland. He agrees with the following assessment and plan. Assessment/Plan:     Unstable angina  - admit to telemetry  - trend troponin   - NPO after midnight for LHC in morning   - IV hydration overnight     CAD s/p CABG x 3 hx PCI 1/29/22  - Patient with complex coronary artery disease. He has a patent left internal mammary artery to the LAD with continuation to the diagonal.  He had acute subocclusion of the vein graft  to the OM which is status post stenting. He has chronically occluded vein graft to the right coronary artery and chronically occluded native right coronary artery. - reports stopping ASA and Plavix for 3 days for oral surgery last month  - on ASA, plavix, carvedilol, atorvastatin     Chronic systolic heart failure/ICD  - ECHO 1/29/22  EF 30-35%  - appears euvolemic   - cont carvedilol  - hold lisinopril secondary to JESI and plan for LHC tomorrow morning     VF/VT  - continue amiodarone     JESI   - IV hydration   -monitor labs     Elevated d-dimer  - CT chest negative for pulmonary embolism       Thank you for requesting cardiac evaluation and allowing us to participate in the care of this patient. We will continue to follow along with you.       Ebony Floyd PA-C  Supervising Physician: Dr Lowell Copeland

## 2022-08-04 NOTE — Clinical Note
Catheter exchanged over exchange length wire with CATHETER GUID 6FR L100CM COR PERIPH MALISSA NYL COAT PTFE LNR [id number]] [facility].

## 2022-08-04 NOTE — Clinical Note
Accessed site: left radial artery. Radial access needle used. Number of attempts: 1. Accessed successfully.

## 2022-08-04 NOTE — ED TRIAGE NOTES
Patient is a 60-year-old male with history of coronary artery disease who presents with chest pain and shortness of breath for 3 days. Pain is substernal.  Worse with exertion. Feels like prior heart attacks. Also complains of right calf pain. Slightly dyspneic at rest.  Regular rate and rhythm. Radial pulses equal strong bilaterally. Patient evaluated initially in triage. Rapid Medical Evaluation was conducted and necessary orders have been placed. I have performed a medical screening exam. Care will now be transferred to the provider in the back of the emergency department.  TAYA Perales 11:53 AM

## 2022-08-05 PROBLEM — I20.0 UNSTABLE ANGINA PECTORIS (HCC): Status: ACTIVE | Noted: 2021-12-02

## 2022-08-05 LAB
ACT BLD: 231 SECS (ref 70–128)
ACT BLD: 266 SECS (ref 70–128)
ANION GAP SERPL CALC-SCNC: 7 MMOL/L (ref 7–16)
BUN SERPL-MCNC: 20 MG/DL (ref 8–23)
CALCIUM SERPL-MCNC: 8.6 MG/DL (ref 8.3–10.4)
CHLORIDE SERPL-SCNC: 111 MMOL/L (ref 98–107)
CO2 SERPL-SCNC: 23 MMOL/L (ref 21–32)
CREAT SERPL-MCNC: 1.4 MG/DL (ref 0.8–1.5)
ECHO BSA: 2.4 M2
ERYTHROCYTE [DISTWIDTH] IN BLOOD BY AUTOMATED COUNT: 13.7 % (ref 11.9–14.6)
GLUCOSE SERPL-MCNC: 114 MG/DL (ref 65–100)
HCT VFR BLD AUTO: 44 % (ref 41.1–50.3)
HGB BLD-MCNC: 14.1 G/DL (ref 13.6–17.2)
MAGNESIUM SERPL-MCNC: 2.3 MG/DL (ref 1.8–2.4)
MCH RBC QN AUTO: 29.3 PG (ref 26.1–32.9)
MCHC RBC AUTO-ENTMCNC: 32 G/DL (ref 31.4–35)
MCV RBC AUTO: 91.5 FL (ref 79.6–97.8)
NRBC # BLD: 0 K/UL (ref 0–0.2)
PLATELET # BLD AUTO: 203 K/UL (ref 150–450)
PMV BLD AUTO: 9.7 FL (ref 9.4–12.3)
POTASSIUM SERPL-SCNC: 3.9 MMOL/L (ref 3.5–5.1)
RBC # BLD AUTO: 4.81 M/UL (ref 4.23–5.6)
SODIUM SERPL-SCNC: 141 MMOL/L (ref 138–145)
TROPONIN I SERPL HS-MCNC: 190.5 PG/ML (ref 0–14)
UFH PPP CHRO-ACNC: <0.1 IU/ML (ref 0.3–0.7)
WBC # BLD AUTO: 8.1 K/UL (ref 4.3–11.1)

## 2022-08-05 PROCEDURE — C1769 GUIDE WIRE: HCPCS | Performed by: INTERNAL MEDICINE

## 2022-08-05 PROCEDURE — 99152 MOD SED SAME PHYS/QHP 5/>YRS: CPT | Performed by: INTERNAL MEDICINE

## 2022-08-05 PROCEDURE — C1874 STENT, COATED/COV W/DEL SYS: HCPCS | Performed by: INTERNAL MEDICINE

## 2022-08-05 PROCEDURE — C9604 PERC D-E COR REVASC T CABG S: HCPCS | Performed by: INTERNAL MEDICINE

## 2022-08-05 PROCEDURE — 6360000002 HC RX W HCPCS: Performed by: NURSE PRACTITIONER

## 2022-08-05 PROCEDURE — 92937 PRQ TRLUML REVSC CAB GRF 1: CPT | Performed by: INTERNAL MEDICINE

## 2022-08-05 PROCEDURE — 93459 L HRT ART/GRFT ANGIO: CPT | Performed by: INTERNAL MEDICINE

## 2022-08-05 PROCEDURE — 6360000004 HC RX CONTRAST MEDICATION: Performed by: INTERNAL MEDICINE

## 2022-08-05 PROCEDURE — 2580000003 HC RX 258: Performed by: PHYSICIAN ASSISTANT

## 2022-08-05 PROCEDURE — 99024 POSTOP FOLLOW-UP VISIT: CPT | Performed by: INTERNAL MEDICINE

## 2022-08-05 PROCEDURE — 80048 BASIC METABOLIC PNL TOTAL CA: CPT

## 2022-08-05 PROCEDURE — 6370000000 HC RX 637 (ALT 250 FOR IP): Performed by: PHYSICIAN ASSISTANT

## 2022-08-05 PROCEDURE — 83735 ASSAY OF MAGNESIUM: CPT

## 2022-08-05 PROCEDURE — 96366 THER/PROPH/DIAG IV INF ADDON: CPT

## 2022-08-05 PROCEDURE — C1884 EMBOLIZATION PROTECT SYST: HCPCS | Performed by: INTERNAL MEDICINE

## 2022-08-05 PROCEDURE — C1894 INTRO/SHEATH, NON-LASER: HCPCS | Performed by: INTERNAL MEDICINE

## 2022-08-05 PROCEDURE — C1887 CATHETER, GUIDING: HCPCS | Performed by: INTERNAL MEDICINE

## 2022-08-05 PROCEDURE — 96375 TX/PRO/DX INJ NEW DRUG ADDON: CPT

## 2022-08-05 PROCEDURE — 85520 HEPARIN ASSAY: CPT

## 2022-08-05 PROCEDURE — 36415 COLL VENOUS BLD VENIPUNCTURE: CPT

## 2022-08-05 PROCEDURE — 85027 COMPLETE CBC AUTOMATED: CPT

## 2022-08-05 PROCEDURE — C1725 CATH, TRANSLUMIN NON-LASER: HCPCS | Performed by: INTERNAL MEDICINE

## 2022-08-05 PROCEDURE — 2500000003 HC RX 250 WO HCPCS: Performed by: INTERNAL MEDICINE

## 2022-08-05 PROCEDURE — 85347 COAGULATION TIME ACTIVATED: CPT

## 2022-08-05 PROCEDURE — 96376 TX/PRO/DX INJ SAME DRUG ADON: CPT

## 2022-08-05 PROCEDURE — G0378 HOSPITAL OBSERVATION PER HR: HCPCS

## 2022-08-05 PROCEDURE — 96365 THER/PROPH/DIAG IV INF INIT: CPT

## 2022-08-05 PROCEDURE — 99153 MOD SED SAME PHYS/QHP EA: CPT | Performed by: INTERNAL MEDICINE

## 2022-08-05 PROCEDURE — 6370000000 HC RX 637 (ALT 250 FOR IP): Performed by: INTERNAL MEDICINE

## 2022-08-05 PROCEDURE — 96361 HYDRATE IV INFUSION ADD-ON: CPT

## 2022-08-05 PROCEDURE — 2709999900 HC NON-CHARGEABLE SUPPLY: Performed by: INTERNAL MEDICINE

## 2022-08-05 PROCEDURE — 6360000002 HC RX W HCPCS: Performed by: INTERNAL MEDICINE

## 2022-08-05 DEVICE — STENT RONYX30038UX RESOLUTE ONYX 3.00X38
Type: IMPLANTABLE DEVICE | Site: HEART | Status: FUNCTIONAL
Brand: RESOLUTE ONYX™

## 2022-08-05 DEVICE — STENT RONYX30015UX RESOLUTE ONYX 3.00X15
Type: IMPLANTABLE DEVICE | Site: HEART | Status: FUNCTIONAL
Brand: RESOLUTE ONYX™

## 2022-08-05 RX ORDER — NITROGLYCERIN 20 MG/100ML
INJECTION INTRAVENOUS PRN
Status: DISCONTINUED | OUTPATIENT
Start: 2022-08-05 | End: 2022-08-05 | Stop reason: HOSPADM

## 2022-08-05 RX ORDER — LIDOCAINE HYDROCHLORIDE 10 MG/ML
INJECTION, SOLUTION INFILTRATION; PERINEURAL PRN
Status: DISCONTINUED | OUTPATIENT
Start: 2022-08-05 | End: 2022-08-05 | Stop reason: HOSPADM

## 2022-08-05 RX ORDER — HEPARIN SODIUM 1000 [USP'U]/ML
2000 INJECTION, SOLUTION INTRAVENOUS; SUBCUTANEOUS PRN
Status: DISCONTINUED | OUTPATIENT
Start: 2022-08-05 | End: 2022-08-05 | Stop reason: SDUPTHER

## 2022-08-05 RX ORDER — HEPARIN SODIUM 200 [USP'U]/100ML
INJECTION, SOLUTION INTRAVENOUS CONTINUOUS PRN
Status: DISCONTINUED | OUTPATIENT
Start: 2022-08-05 | End: 2022-08-05 | Stop reason: HOSPADM

## 2022-08-05 RX ORDER — HEPARIN SODIUM 1000 [USP'U]/ML
4000 INJECTION, SOLUTION INTRAVENOUS; SUBCUTANEOUS ONCE
Status: COMPLETED | OUTPATIENT
Start: 2022-08-05 | End: 2022-08-05

## 2022-08-05 RX ORDER — HEPARIN SODIUM 10000 [USP'U]/ML
INJECTION, SOLUTION INTRAVENOUS; SUBCUTANEOUS PRN
Status: DISCONTINUED | OUTPATIENT
Start: 2022-08-05 | End: 2022-08-05 | Stop reason: HOSPADM

## 2022-08-05 RX ORDER — CLOPIDOGREL BISULFATE 75 MG/1
TABLET ORAL PRN
Status: DISCONTINUED | OUTPATIENT
Start: 2022-08-05 | End: 2022-08-05 | Stop reason: HOSPADM

## 2022-08-05 RX ORDER — HEPARIN SODIUM 10000 [USP'U]/100ML
5-30 INJECTION, SOLUTION INTRAVENOUS CONTINUOUS
Status: DISCONTINUED | OUTPATIENT
Start: 2022-08-05 | End: 2022-08-05

## 2022-08-05 RX ORDER — HEPARIN SODIUM 1000 [USP'U]/ML
4000 INJECTION, SOLUTION INTRAVENOUS; SUBCUTANEOUS PRN
Status: DISCONTINUED | OUTPATIENT
Start: 2022-08-05 | End: 2022-08-05 | Stop reason: SDUPTHER

## 2022-08-05 RX ORDER — MIDAZOLAM HYDROCHLORIDE 1 MG/ML
INJECTION INTRAMUSCULAR; INTRAVENOUS PRN
Status: DISCONTINUED | OUTPATIENT
Start: 2022-08-05 | End: 2022-08-05 | Stop reason: HOSPADM

## 2022-08-05 RX ADMIN — SODIUM CHLORIDE, PRESERVATIVE FREE 10 ML: 5 INJECTION INTRAVENOUS at 21:31

## 2022-08-05 RX ADMIN — ATORVASTATIN CALCIUM 40 MG: 40 TABLET, FILM COATED ORAL at 21:31

## 2022-08-05 RX ADMIN — FENOFIBRATE 160 MG: 160 TABLET ORAL at 08:26

## 2022-08-05 RX ADMIN — HEPARIN SODIUM AND DEXTROSE 8 UNITS/KG/HR: 10000; 5 INJECTION INTRAVENOUS at 02:15

## 2022-08-05 RX ADMIN — ASPIRIN 81 MG: 81 TABLET ORAL at 08:26

## 2022-08-05 RX ADMIN — SODIUM CHLORIDE, PRESERVATIVE FREE 10 ML: 5 INJECTION INTRAVENOUS at 08:28

## 2022-08-05 RX ADMIN — SODIUM CHLORIDE, PRESERVATIVE FREE 5 ML: 5 INJECTION INTRAVENOUS at 00:00

## 2022-08-05 RX ADMIN — SODIUM CHLORIDE: 9 INJECTION, SOLUTION INTRAVENOUS at 01:58

## 2022-08-05 RX ADMIN — HEPARIN SODIUM 4000 UNITS: 1000 INJECTION INTRAVENOUS; SUBCUTANEOUS at 02:15

## 2022-08-05 RX ADMIN — HYDROCODONE BITARTRATE AND ACETAMINOPHEN 1 TABLET: 10; 325 TABLET ORAL at 08:25

## 2022-08-05 RX ADMIN — SODIUM CHLORIDE: 9 INJECTION, SOLUTION INTRAVENOUS at 15:53

## 2022-08-05 RX ADMIN — AMIODARONE HYDROCHLORIDE 200 MG: 200 TABLET ORAL at 08:26

## 2022-08-05 RX ADMIN — ACETAMINOPHEN 650 MG: 325 TABLET, FILM COATED ORAL at 04:40

## 2022-08-05 RX ADMIN — HYDROCODONE BITARTRATE AND ACETAMINOPHEN 1 TABLET: 10; 325 TABLET ORAL at 00:19

## 2022-08-05 RX ADMIN — HYDROCODONE BITARTRATE AND ACETAMINOPHEN 1 TABLET: 10; 325 TABLET ORAL at 18:19

## 2022-08-05 RX ADMIN — CLOPIDOGREL BISULFATE 75 MG: 75 TABLET ORAL at 08:26

## 2022-08-05 ASSESSMENT — PAIN DESCRIPTION - LOCATION
LOCATION: CHEST
LOCATION: HEAD
LOCATION: CHEST
LOCATION: WRIST

## 2022-08-05 ASSESSMENT — PAIN SCALES - GENERAL
PAINLEVEL_OUTOF10: 9
PAINLEVEL_OUTOF10: 0
PAINLEVEL_OUTOF10: 0
PAINLEVEL_OUTOF10: 6
PAINLEVEL_OUTOF10: 0
PAINLEVEL_OUTOF10: 6
PAINLEVEL_OUTOF10: 0
PAINLEVEL_OUTOF10: 3
PAINLEVEL_OUTOF10: 6
PAINLEVEL_OUTOF10: 0

## 2022-08-05 ASSESSMENT — PAIN DESCRIPTION - DESCRIPTORS
DESCRIPTORS: ACHING
DESCRIPTORS: ACHING
DESCRIPTORS: SHARP
DESCRIPTORS: PRESSURE

## 2022-08-05 ASSESSMENT — PAIN DESCRIPTION - ONSET: ONSET: GRADUAL

## 2022-08-05 ASSESSMENT — PAIN DESCRIPTION - FREQUENCY: FREQUENCY: CONTINUOUS

## 2022-08-05 ASSESSMENT — PAIN DESCRIPTION - ORIENTATION
ORIENTATION: MID
ORIENTATION: LEFT
ORIENTATION: MID
ORIENTATION: UPPER

## 2022-08-05 ASSESSMENT — PAIN - FUNCTIONAL ASSESSMENT: PAIN_FUNCTIONAL_ASSESSMENT: ACTIVITIES ARE NOT PREVENTED

## 2022-08-05 ASSESSMENT — PAIN DESCRIPTION - PAIN TYPE: TYPE: ACUTE PAIN

## 2022-08-05 NOTE — PROGRESS NOTES
[facility] CARDIOLOGY PROGRESS NOTE           8/5/2022 1:01 PM    Admit Date: 8/4/2022         Subjective: Cath today showed significant obstruction of the SVG to OM1 s/p PCI to the vein graft. ROS:  Cardiovascular:  As noted above    Objective:      Vitals:    08/05/22 1048 08/05/22 1101 08/05/22 1116 08/05/22 1124   BP: 115/73 113/61 97/75 111/64   Pulse: (!) 48 54 52 (!) 49   Resp: 15      Temp: 98 °F (36.7 °C)      TempSrc:       SpO2: 94% 92% 94% 96%   Weight:       Height:           Physical Exam:  General: Well Developed, Well Nourished, No Acute Distress, Alert & Oriented x 3, Appropriate mood  Neck: supple, no JVD  Heart: S1S2 with RRR without murmurs or gallops  Lungs: Clear throughout auscultation bilaterally without adventitious sounds  Abd: soft, nontender, nondistended, with good bowel sounds  Ext: no edema bilaterally  Skin: warm and dry      Data Review:   Recent Labs     08/04/22  1200 08/05/22  0424    141   K 4.5 3.9   MG 2.2 2.3   BUN 22 20   WBC 9.1 8.1   HGB 15.7 14.1   HCT 48.3 44.0    203       No results for input(s): TNIPOC in the last 72 hours. Invalid input(s): TROIQ          Assessment/Plan:     Principal Problem:    Unstable angina ([address]  Resolved Problems:    * No resolved hospital problems.  *    A/P  1) CAD - DAPT asa 81 mg and plavix 75 mg daily  2) Lipids - atorvastatin 40 mg  3) HTN - coreg 12.5 mg BID    Kay Cardoza MD  8/5/2022 1:01 PM

## 2022-08-05 NOTE — PROGRESS NOTES
TRANSFER - IN REPORT:    Verbal report received from Estiven Stephen RN on Hannah Barker being received from ED for routine progression of patient care      Report consisted of patients Situation, Background, Assessment and Recommendations(SBAR). Information from the following report(s) SBAR, Kardex, Recent Results, and Med Rec Status was reviewed with the receiving nurse. Opportunity for questions and clarification was provided. Assessment completed upon patients arrival to unit and care assumed.

## 2022-08-05 NOTE — PROGRESS NOTES
TRANSFER - OUT REPORT:    Verbal report given to Aaron, RN on Eppie Job  being transferred to [phone] for routine post-op       Report consisted of patients Situation, Background, Assessment and Recommendations(SBAR). Information from the following report(s) Procedure Summary was reviewed with the receiving nurse. Opportunity for questions and clarification was provided.

## 2022-08-05 NOTE — PROGRESS NOTES
Skin is overall CDI, no wounds noted on sacrum or heels. Will continue to monitor. Skin assessed with Marcelene Stamp.

## 2022-08-05 NOTE — CARE COORDINATION
Pt presented with recurrent chest pain syndrome; unstable angina. Pt to go to cath lab for [facility] with Dr. Agnes Carbajal. Pt lives with spouse in a private residence. Drives. Indep at baseline. Denies DME use, although he has everything from canes to [facility] from taking care of his mother and his wife. Denies h/o HH and STR, not currently active with services. PCP confirmed. Insurance verified. Able to afford meds. 08/05/22 1309   Service Assessment   Patient Orientation Alert and Oriented   Cognition Alert   History Provided By Patient   Primary Caregiver Self   Support Systems Spouse/Significant Other;Children;[employer] [facility];Friends/Neighbors   PCP Verified by CM Yes  (Nichelle)   Prior Functional Level Independent in ADLs/IADLs   Current Functional Level Independent in ADLs/IADLs   Can patient return to prior living arrangement Yes   Ability to make needs known: Good   Family able to assist with home care needs: Yes   Financial Resources Medicare   Social/Functional History   Lives With Spouse   Discharge Planning   Current Services Prior To Admission None   Potential Assistance Needed N/A   DME Ordered? No   Potential Assistance Purchasing Medications No   Type of Home Care Services None   Services At/After Discharge   Transition of Care Consult (CM Consult) Discharge K[id number] Discharge None    Resource Information Provided?  No   Mode of Transport at Discharge Other (see comment)   Confirm Follow Up Transport Self

## 2022-08-05 NOTE — PROGRESS NOTES
[facility] by Sharona Alexis  Left radial access  PCI to SVG-OM x2   Left wrist TR band with 12ml  Versed 5mg IV  Fentanyl 100mcg IV  Heparin 91220 units total  Plavix 300mg PO  Access site soft. Clean, dry, and intact; with no signs of bleeding or hematoma  Pt. Tolerated procedure    TRANSFER - OUT REPORT:    Verbal report given to Maria Del Rosario Monet on David Mcknight  being transferred to [facility] for routine progression of patient care       Report consisted of patient's Situation, Background, Assessment and   Recommendations(SBAR). Information from the following report(s) Nurse Handoff Report and MAR was reviewed with the receiving nurse. Lines:   Peripheral IV 08/04/22 Right Antecubital (Active)   Site Assessment Clean, dry & intact 08/05/22 0824   Line Status Flushed;Capped 08/05/22 0824   Line Care Cap changed 08/05/22 0824   Phlebitis Assessment No symptoms 08/05/22 0824   Infiltration Assessment 0 08/05/22 0824   Alcohol Cap Used No 08/05/22 0824   Dressing Status Clean, dry & intact 08/05/22 0824   Dressing Type Transparent 08/05/22 0824        Opportunity for questions and clarification was provided.       Patient transported with:  Milyoni

## 2022-08-05 NOTE — PROGRESS NOTES
Radial compression band removed at 1540 after slowly reducing air from 12 cc to zero as per hospital protocol. No bleeding or hematoma noted. 2 x 2 gauze with tegaderm placed over puncture site. The affected extremity is warm and dry to the touch. Patient instructed to call if any bleeding noted on gauze. Patient verbalized understanding the nursing instructions.

## 2022-08-05 NOTE — PROGRESS NOTES
TRANSFER - IN REPORT:    Verbal report received from [facility], RN on Marisol Joseph being received from [facility] for routine post-op      Report consisted of patients Situation, Background, Assessment and Recommendations(SBAR). Information from the following report(s) Procedure Summary was reviewed with the receiving nurse. Opportunity for questions and clarification was provided. Assessment completed upon patients arrival to unit and care assumed.

## 2022-08-06 VITALS
DIASTOLIC BLOOD PRESSURE: 82 MMHG | SYSTOLIC BLOOD PRESSURE: 157 MMHG | HEART RATE: 58 BPM | RESPIRATION RATE: 20 BRPM | OXYGEN SATURATION: 99 % | TEMPERATURE: 97.8 F | WEIGHT: 245.3 LBS | HEIGHT: 72 IN | BODY MASS INDEX: 33.23 KG/M2

## 2022-08-06 PROBLEM — I20.0 UNSTABLE ANGINA PECTORIS (HCC): Status: RESOLVED | Noted: 2021-12-02 | Resolved: 2022-08-06

## 2022-08-06 LAB
ANION GAP SERPL CALC-SCNC: 6 MMOL/L (ref 7–16)
BUN SERPL-MCNC: 17 MG/DL (ref 8–23)
CALCIUM SERPL-MCNC: 8.5 MG/DL (ref 8.3–10.4)
CHLORIDE SERPL-SCNC: 112 MMOL/L (ref 98–107)
CO2 SERPL-SCNC: 24 MMOL/L (ref 21–32)
CREAT SERPL-MCNC: 1.3 MG/DL (ref 0.8–1.5)
ERYTHROCYTE [DISTWIDTH] IN BLOOD BY AUTOMATED COUNT: 13.6 % (ref 11.9–14.6)
GLUCOSE SERPL-MCNC: 96 MG/DL (ref 65–100)
HCT VFR BLD AUTO: 41.2 % (ref 41.1–50.3)
HGB BLD-MCNC: 13.3 G/DL (ref 13.6–17.2)
MCH RBC QN AUTO: 29.5 PG (ref 26.1–32.9)
MCHC RBC AUTO-ENTMCNC: 32.3 G/DL (ref 31.4–35)
MCV RBC AUTO: 91.4 FL (ref 79.6–97.8)
NRBC # BLD: 0 K/UL (ref 0–0.2)
PLATELET # BLD AUTO: 178 K/UL (ref 150–450)
PMV BLD AUTO: 9.7 FL (ref 9.4–12.3)
POTASSIUM SERPL-SCNC: 4 MMOL/L (ref 3.5–5.1)
RBC # BLD AUTO: 4.51 M/UL (ref 4.23–5.6)
SODIUM SERPL-SCNC: 142 MMOL/L (ref 138–145)
WBC # BLD AUTO: 8 K/UL (ref 4.3–11.1)

## 2022-08-06 PROCEDURE — 36415 COLL VENOUS BLD VENIPUNCTURE: CPT

## 2022-08-06 PROCEDURE — 99238 HOSP IP/OBS DSCHRG MGMT 30/<: CPT | Performed by: INTERNAL MEDICINE

## 2022-08-06 PROCEDURE — 96361 HYDRATE IV INFUSION ADD-ON: CPT

## 2022-08-06 PROCEDURE — 80048 BASIC METABOLIC PNL TOTAL CA: CPT

## 2022-08-06 PROCEDURE — 85027 COMPLETE CBC AUTOMATED: CPT

## 2022-08-06 PROCEDURE — 2580000003 HC RX 258: Performed by: PHYSICIAN ASSISTANT

## 2022-08-06 PROCEDURE — G0378 HOSPITAL OBSERVATION PER HR: HCPCS

## 2022-08-06 PROCEDURE — 6370000000 HC RX 637 (ALT 250 FOR IP): Performed by: PHYSICIAN ASSISTANT

## 2022-08-06 RX ORDER — CLOPIDOGREL BISULFATE 75 MG/1
75 TABLET ORAL DAILY
Qty: 90 TABLET | Refills: 3 | Status: SHIPPED | OUTPATIENT
Start: 2022-08-06

## 2022-08-06 RX ADMIN — AMIODARONE HYDROCHLORIDE 200 MG: 200 TABLET ORAL at 10:43

## 2022-08-06 RX ADMIN — FENOFIBRATE 160 MG: 160 TABLET ORAL at 10:42

## 2022-08-06 RX ADMIN — ASPIRIN 81 MG: 81 TABLET ORAL at 10:42

## 2022-08-06 RX ADMIN — CLOPIDOGREL BISULFATE 75 MG: 75 TABLET ORAL at 10:43

## 2022-08-06 RX ADMIN — ACETAMINOPHEN 650 MG: 325 TABLET, FILM COATED ORAL at 05:03

## 2022-08-06 RX ADMIN — SODIUM CHLORIDE, PRESERVATIVE FREE 10 ML: 5 INJECTION INTRAVENOUS at 10:42

## 2022-08-06 ASSESSMENT — PAIN SCALES - GENERAL
PAINLEVEL_OUTOF10: 3
PAINLEVEL_OUTOF10: 0

## 2022-08-06 ASSESSMENT — PAIN - FUNCTIONAL ASSESSMENT: PAIN_FUNCTIONAL_ASSESSMENT: ACTIVITIES ARE NOT PREVENTED

## 2022-08-06 ASSESSMENT — PAIN DESCRIPTION - ONSET: ONSET: SUDDEN

## 2022-08-06 ASSESSMENT — PAIN DESCRIPTION - LOCATION: LOCATION: HEAD

## 2022-08-06 ASSESSMENT — PAIN DESCRIPTION - DESCRIPTORS: DESCRIPTORS: ACHING;SORE

## 2022-08-06 ASSESSMENT — PAIN DESCRIPTION - PAIN TYPE: TYPE: ACUTE PAIN

## 2022-08-06 ASSESSMENT — PAIN DESCRIPTION - ORIENTATION: ORIENTATION: ANTERIOR

## 2022-08-06 NOTE — PLAN OF CARE
Problem: Discharge Planning  Goal: Discharge to home or other facility with appropriate resources  Outcome: Completed     Problem: Safety - Adult  Goal: Free from fall injury  Outcome: Completed     Problem: Pain  Goal: Verbalizes/displays adequate comfort level or baseline comfort level  Outcome: Completed

## 2022-08-06 NOTE — DISCHARGE INSTRUCTIONS
[facility] office has been informed you need a follow up appointment after discharge. You will be called on Monday with the follow up appointment. If you have NOT heard from our office by Tuesday, please contact our office for appointment at [phone]. DISPOSITION: The patient is being discharged home in stable condition on a low saturated fat, low cholesterol and low salt diet. The patient is instructed to advance activities as tolerated to the limit of fatigue or shortness of breath. The patient is instructed to avoid all heavy lifting, straining, stooping or squatting for 3-5 days. The patient is instructed to watch the cath site for bleeding/oozing; if seen, the patient is instructed to apply firm pressure with a clean cloth and call [facility] at [phone]. The patient is instructed to watch for signs of infection which include: increasing area of redness, fever/hot to touch or purulent drainage at the catheterization site. The patient is instructed not to soak in a bathtub for 7-10 days, but is cleared to shower. The patient is instructed to call the office or return to the ER for immediate evaluation for any shortness of breath or chest pain not relieved by NTG. Learning About Coronary Angioplasty  What is a coronary angioplasty? Coronary angioplasty is a procedure that uses a thin tube called a catheter to open a blocked or narrowed coronary artery. Coronary arteries are the blood vessels that bring oxygen to the heart muscle. Angioplasty also may be calledpercutaneous coronary intervention (PCI). Angioplasty can widen an artery that has been narrowed by fatty buildup (plaque) or blocked by a blood clot. The procedure helps blood flow morenormally to the heart muscle. How is the procedure done? Coronary angioplasty is done in a cardiac catheterization laboratory (\"cath lab\").  It is done by a heart specialist called a cardiologist. The wholeprocedure may take 1½ to 3 hours.  You lie on a table under a large X-ray machine. You will get medicine through an IV in one of your veins. It helps you relax and not feel pain. You will beawake during the procedure. But you may not be able to remember much about it. The doctor will inject some medicine into your wrist or groin to numb the skin. You will feel a small needle poke you. It's like having a blood test. You may feel some pressure when the doctor puts in the catheter. But you will not feelpain. The doctor will look at X-ray pictures on a monitor (like a TV screen) to move the catheter to your heart. The doctor then puts a dye into the catheter. This makes your heart's arteries show up on a screen. The doctor can then see any arteries that are blocked or narrowed. You may feel warm or flushed for a shorttime when the doctor injects dye into your artery. If you have a blocked or narrow artery, the doctor uses a catheter with a tiny balloon at the tip. The doctor puts it into the blocked or narrow area and inflates it. The balloon presses the fatty buildup against the walls of the artery. This buildup is called plaque. This creates more room for blood to flow. In most cases, the doctor then puts a stent in the artery. A stent is a small, expandable tube. It presses against the walls of the artery. The stent is left in the artery to keep the artery open. This helps blood flow. Caretha Clock is removed from your body. What happens right after the procedure? The catheter will be removed. Pressure may be applied to the area where the catheter was put into your blood vessel. This will help prevent bleeding. A small device may also be used to close the blood vessel. You may have a bandage or a compression device on the catheter site. After the procedure, you will be taken to a room where the catheter site and your heart rate, blood pressure, and temperature will be checked several times.  If the catheter was put in your groin, you will need to lie still and keep your leg straight for up to a few hours. If the catheter was put in your wrist, you may need to keep your armstill for at least 2 hours. You may go home the same day. Or you may stay at least 1 night in the hospital. When you go home, you will get instructions from your doctor to help yourecover well and prevent problems. Make sure to drink plenty of fluids (unless your doctor tells you not to) for several hours after the procedure. This will help flush the dye out of yourbody. Follow-up care is a key part of your treatment and safety. Be sure to make and go to all appointments, and call your doctor if you are having problems. It's also a good idea to know your test results and keep alist of the medicines you take. Where can you learn more? Go to https://[email or web address]DineGasm. org and sign in to your [employer] account. Enter W312 in the VocalZoom box to learn more about \"Learning About Coronary Angioplasty. \"     If you do not have an account, please click on the \"Sign Up Now\" link. Current as of: January 10, 2022               Content Version: 13.3  © 6121-7760 Healthwise, Incorporated. Care instructions adapted under license by [facility] ([facility]). If you have questions about a medical condition or this instruction, always ask your healthcare professional. [address] any warranty or liability for your use of this information.

## 2022-08-06 NOTE — PROGRESS NOTES
[facility] CARDIOLOGY PROGRESS NOTE           8/6/2022 8:14 AM    Admit Date: 8/4/2022    Admit Diagnosis: Unstable angina (HCC) [I20.0]  Unstable angina pectoris (HCC) [I20.0]      Subjective:   No complaints this AM, no chest pain or shortness of breath    Interval History: (History of pertinent interval events obtained from nursing staff)    ROS:  GEN:  No fever or chills  Cardiovascular:  As noted above  Pulmonary:  As noted above  Neuro:  No new focal motor or sensory loss      Objective:     Vitals:    08/05/22 1740 08/05/22 2021 08/06/22 0028 08/06/22 0435   BP: (!) 98/55 131/81 126/69 (!) 132/94   Pulse: 51 53 56 58   Resp: 19 20 18 20   Temp: 97.5 °F (36.4 °C) 97.3 °F (36.3 °C) 97.1 °F (36.2 °C) 97.8 °F (36.6 °C)   TempSrc: Oral Oral Axillary Axillary   SpO2: 94% 95% 96% 99%   Weight:    245 lb 4.8 oz (111.3 kg)   Height:           Physical Exam:  General-Well Developed, Well Nourished, No Acute Distress, Alert & Oriented x 3, appropriate mood. Neck- supple, no JVD  CV- regular rate and rhythm no MRG  Lung- clear bilaterally  Abd- soft, nontender, nondistended  Ext- no edema bilaterally.   Skin- warm and dry    Current Facility-Administered Medications   Medication Dose Route Frequency    amiodarone (CORDARONE) tablet 200 mg  200 mg Oral Daily    aspirin EC tablet 81 mg  81 mg Oral Daily    atorvastatin (LIPITOR) tablet 40 mg  40 mg Oral Nightly    carvedilol (COREG) tablet 12.5 mg  12.5 mg Oral BID WC    clopidogrel (PLAVIX) tablet 75 mg  75 mg Oral Daily    diazePAM (VALIUM) tablet 5 mg  5 mg Oral Q8H PRN    diphenhydrAMINE (BENADRYL) capsule 25 mg  25 mg Oral Q6H PRN    fenofibrate (TRIGLIDE) tablet 160 mg  160 mg Oral Daily    HYDROcodone-acetaminophen (NORCO)  MG per tablet 1 tablet  1 tablet Oral Q4H PRN    [Held by provider] lisinopril (PRINIVIL;ZESTRIL) tablet 20 mg  20 mg Oral Q12H    sodium chloride flush 0.9 % injection 5-40 mL  5-40 mL IntraVENous 2 times per day sodium chloride flush 0.9 % injection 5-40 mL  5-40 mL IntraVENous PRN    0.9 % sodium chloride infusion   IntraVENous PRN    ondansetron (ZOFRAN-ODT) disintegrating tablet 4 mg  4 mg Oral Q8H PRN    Or    ondansetron (ZOFRAN) injection 4 mg  4 mg IntraVENous Q6H PRN    acetaminophen (TYLENOL) tablet 650 mg  650 mg Oral Q6H PRN    Or    acetaminophen (TYLENOL) suppository 650 mg  650 mg Rectal Q6H PRN    polyethylene glycol (GLYCOLAX) packet 17 g  17 g Oral Daily PRN    0.9 % sodium chloride infusion   IntraVENous Continuous     Data Review:   Recent Results (from the past 24 hour(s))   Anti-Xa, Unfractionated Heparin    Collection Time: 08/05/22  8:23 AM   Result Value Ref Range    Anti-XA Unfrac Heparin <0.10 (L) 0.3 - 0.7 IU/mL   POCT activated clotting time    Collection Time: 08/05/22 10:06 AM   Result Value Ref Range    Activated Clotting Time 231 (H) 70 - 128 SECS   POCT activated clotting time    Collection Time: 08/05/22 10:17 AM   Result Value Ref Range    Activated Clotting Time 266 (H) 70 - 128 SECS   Cardiac procedure    Collection Time: 08/05/22 10:43 AM   Result Value Ref Range    Body Surface Area 2.4 m2   CBC    Collection Time: 08/06/22  4:13 AM   Result Value Ref Range    WBC 8.0 4.3 - 11.1 K/uL    RBC 4.51 4.23 - 5.6 M/uL    Hemoglobin 13.3 (L) 13.6 - 17.2 g/dL    Hematocrit 41.2 41.1 - 50.3 %    MCV 91.4 79.6 - 97.8 FL    MCH 29.5 26.1 - 32.9 PG    MCHC 32.3 31.4 - 35.0 g/dL    RDW 13.6 11.9 - 14.6 %    Platelets 348 468 - 130 K/uL    MPV 9.7 9.4 - 12.3 FL    nRBC 0.00 0.0 - 0.2 K/uL   Basic Metabolic Panel w/ Reflex to MG    Collection Time: 08/06/22  4:13 AM   Result Value Ref Range    Sodium 142 138 - 145 mmol/L    Potassium 4.0 3.5 - 5.1 mmol/L    Chloride 112 (H) 98 - 107 mmol/L    CO2 24 21 - 32 mmol/L    Anion Gap 6 (L) 7 - 16 mmol/L    Glucose 96 65 - 100 mg/dL    BUN 17 8 - 23 MG/DL    Creatinine 1.30 0.8 - 1.5 MG/DL    GFR African American >60 >60 ml/min/1.73m2    GFR Non-African American 59 (L) >60 ml/min/1.73m2    Calcium 8.5 8.3 - 10.4 MG/DL       EKG:  (EKG has been independently visualized by me with interpretation below)  Assessment:     Principal Problem:    Unstable angina pectoris ([address]  Resolved Problems:    * No resolved hospital problems. *    Plan:   1. CAD - DAPT asa 81 mg and plavix 75 mg daily  2. Lipids - atorvastatin 40 mg  3. HTN - coreg 12.5 mg BID  4. Dispo: home today    Tammy Haile.  Gail KAYE  Cardiology/Electrophysiology

## 2022-08-06 NOTE — CARE COORDINATION
Discharge order is in. Pt is discharging home in stable condition. No discharge needs identified. Tx goals met. Family transported pt home. 08/06/22 3599   Services At/After Discharge   Transition of Care Consult (CM Consult) Discharge [address] Discharge None   [place] Resource Information Provided? No   Mode of Transport at Discharge Other (see comment)  (Family)   Confirm Follow Up Transport Family   Condition of Participation: Discharge Planning   The Patient and/or Patient Representative was provided with a Choice of Provider? Patient   The Patient and/Or Patient Representative agree with the Discharge Plan? Yes   Freedom of Choice list was provided with basic dialogue that supports the patient's individualized plan of care/goals, treatment preferences, and shares the quality data associated with the providers?   Yes

## 2022-08-06 NOTE — DISCHARGE SUMMARY
[facility] Cardiology Discharge Summary     Patient ID:  Anabel Menchaca  [id number]  31 y.o.  1955    Admit date: 8/4/2022    Discharge date:  8/6/2022    Admitting Physician: Baljinder John MD     Discharge Physician: BETZY Mcleod CNP/Dr. Eliseo Carvalho    Admission Diagnoses: Unstable angina ([facility] [I20.0]  Unstable angina pectoris ([facility] [I20.0]    Discharge Diagnoses:     Principal Problem (Resolved):    Unstable angina pectoris [facility])  Active Problems:    CAD (coronary artery disease), native coronary artery        Cardiology Procedures:  Left heart catheterization with PCI  Consults: none    Hospital Course: Patient presented to the ED with c/o CP, SOB and diaphoresis. Pt was found to have elevated hsTroponin and was admitted to the Telemetry floor. [place] [facility]. Patient underwent cardiac catheterization by Dr. Celeste Pérez. Patient was found to have a 90% stenosis of the SVG->OM1 that was stented with a 3.0 x 38mm Chandler PAOLA and overlapped this with a 3.0 x 15mm Chandler PAOLA with 20% residual stenosis. Patient was found to have 3/4 patent grafts with known occluded SVG->RCA. Patient tolerated the procedure well and was taken to the Telemetry floor for recovery. The following morning patient was up feeling well without any complaints of chest pain or shortness of breath. Patient's LRA cath site was clean, dry and intact without hematoma or bruit. Patient's labs were stable. Patient was seen and examined by Dr. Eliseo Carvalho and determined stable and ready for discharge. Patient was instructed on the importance of medication compliance including taking Aspirin and Plavix everyday without missing a dose. After receiving drug eluting stents, the patient will remain on dual anti-platelet therapy for at least 1 year. For maximized medical therapy for CAD, patient will continue BB, , and statin.  He will not be on an ACE at this time due to periods of hypotension and recently elevation in CR but has not returned to baseline. The patient will follow up with [facility] and has been referred to cardiac rehab. [facility] office has been informed you need a follow up appointment after discharge. You will be called on Monday with the follow up appointment. If you have NOT heard from our office by Tuesday, please contact our office for appointment at [phone]. DISPOSITION: The patient is being discharged home in stable condition on a low saturated fat, low cholesterol and low salt diet. The patient is instructed to advance activities as tolerated to the limit of fatigue or shortness of breath. The patient is instructed to avoid all heavy lifting, straining, stooping or squatting for 3-5 days. The patient is instructed to watch the cath site for bleeding/oozing; if seen, the patient is instructed to apply firm pressure with a clean cloth and call [facility] at [phone]. The patient is instructed to watch for signs of infection which include: increasing area of redness, fever/hot to touch or purulent drainage at the catheterization site. The patient is instructed not to soak in a bathtub for 7-10 days, but is cleared to shower. The patient is instructed to call the office or return to the ER for immediate evaluation for any shortness of breath or chest pain not relieved by NTG. Discharge Exam: BP (!) 132/94   Pulse 58   Temp 97.8 °F (36.6 °C) (Axillary)   Resp 20   Ht 6' (1.829 m)   Wt 245 lb 4.8 oz (111.3 kg)   SpO2 99%   BMI 33.27 kg/m²       Patient has been seen by Dr. Burnard Goltz: see his progress note for exam details.     Recent Results (from the past 24 hour(s))   Anti-Xa, Unfractionated Heparin    Collection Time: 08/05/22  8:23 AM   Result Value Ref Range    Anti-XA Unfrac Heparin <0.10 (L) 0.3 - 0.7 IU/mL   POCT activated clotting time    Collection Time: 08/05/22 10:06 AM   Result Value Ref Range    Activated Clotting Time 231 (H) 70 - 128 SECS   POCT activated clotting time    Collection Time: 08/05/22 10:17 AM   Result Value Ref Range    Activated Clotting Time 266 (H) 70 - 128 SECS   Cardiac procedure    Collection Time: 08/05/22 10:43 AM   Result Value Ref Range    Body Surface Area 2.4 m2   CBC    Collection Time: 08/06/22  4:13 AM   Result Value Ref Range    WBC 8.0 4.3 - 11.1 K/uL    RBC 4.51 4.23 - 5.6 M/uL    Hemoglobin 13.3 (L) 13.6 - 17.2 g/dL    Hematocrit 41.2 41.1 - 50.3 %    MCV 91.4 79.6 - 97.8 FL    MCH 29.5 26.1 - 32.9 PG    MCHC 32.3 31.4 - 35.0 g/dL    RDW 13.6 11.9 - 14.6 %    Platelets 851 882 - 440 K/uL    MPV 9.7 9.4 - 12.3 FL    nRBC 0.00 0.0 - 0.2 K/uL   Basic Metabolic Panel w/ Reflex to MG    Collection Time: 08/06/22  4:13 AM   Result Value Ref Range    Sodium 142 138 - 145 mmol/L    Potassium 4.0 3.5 - 5.1 mmol/L    Chloride 112 (H) 98 - 107 mmol/L    CO2 24 21 - 32 mmol/L    Anion Gap 6 (L) 7 - 16 mmol/L    Glucose 96 65 - 100 mg/dL    BUN 17 8 - 23 MG/DL    Creatinine 1.30 0.8 - 1.5 MG/DL    GFR African American >60 >60 ml/min/1.73m2    GFR Non- 59 (L) >60 ml/min/1.73m2    Calcium 8.5 8.3 - 10.4 MG/DL         Patient Instructions:     Current Discharge Medication List        CONTINUE these medications which have CHANGED    Details   clopidogrel (PLAVIX) 75 MG tablet Take 1 tablet by mouth in the morning. Qty: 90 tablet, Refills: 3           CONTINUE these medications which have NOT CHANGED    Details   amiodarone (CORDARONE) 200 MG tablet Take 1 tablet by mouth daily  Qty: 90 tablet, Refills: 2      aspirin 81 MG EC tablet Take by mouth daily      atorvastatin (LIPITOR) 40 MG tablet Take by mouth daily      carvedilol (COREG) 12.5 MG tablet Take 12.5 mg by mouth 2 times daily (with meals)      diazePAM (VALIUM) 5 MG tablet Take 5 mg by mouth.       diphenhydrAMINE (BENADRYL) 25 MG capsule Take 25 mg by mouth every 6 hours as needed      fenofibrate (TRIGLIDE) 160 MG tablet TAKE 1 TABLET BY MOUTH ONCE DAILY      HYDROcodone-acetaminophen (NORCO)  MG per tablet Take 1 tablet by mouth every 4 hours as needed.       omeprazole (PRILOSEC) 40 MG delayed release capsule Take 40 mg by mouth daily           STOP taking these medications       acetaminophen (TYLENOL) 500 MG tablet Comments:   Reason for Stopping:         lisinopril (PRINIVIL;ZESTRIL) 20 MG tablet Comments:   Reason for Stopping:             BETZY Arroyo CNP  08/06/22  8:21 AM

## 2022-08-22 ENCOUNTER — OFFICE VISIT (OUTPATIENT)
Dept: CARDIOLOGY CLINIC | Age: 67
End: 2022-08-22
Payer: MEDICARE

## 2022-08-22 VITALS
HEART RATE: 76 BPM | HEIGHT: 72 IN | WEIGHT: 252 LBS | BODY MASS INDEX: 34.13 KG/M2 | DIASTOLIC BLOOD PRESSURE: 100 MMHG | SYSTOLIC BLOOD PRESSURE: 180 MMHG

## 2022-08-22 DIAGNOSIS — I10 PRIMARY HYPERTENSION: ICD-10-CM

## 2022-08-22 DIAGNOSIS — I50.22 CHRONIC SYSTOLIC HEART FAILURE (HCC): ICD-10-CM

## 2022-08-22 DIAGNOSIS — I25.10 CORONARY ARTERY DISEASE INVOLVING NATIVE CORONARY ARTERY OF NATIVE HEART WITHOUT ANGINA PECTORIS: ICD-10-CM

## 2022-08-22 DIAGNOSIS — E78.00 PURE HYPERCHOLESTEROLEMIA: ICD-10-CM

## 2022-08-22 DIAGNOSIS — I73.9 PVD (PERIPHERAL VASCULAR DISEASE) (HCC): Primary | ICD-10-CM

## 2022-08-22 PROCEDURE — 3017F COLORECTAL CA SCREEN DOC REV: CPT | Performed by: INTERNAL MEDICINE

## 2022-08-22 PROCEDURE — G8417 CALC BMI ABV UP PARAM F/U: HCPCS | Performed by: INTERNAL MEDICINE

## 2022-08-22 PROCEDURE — 1036F TOBACCO NON-USER: CPT | Performed by: INTERNAL MEDICINE

## 2022-08-22 PROCEDURE — G8428 CUR MEDS NOT DOCUMENT: HCPCS | Performed by: INTERNAL MEDICINE

## 2022-08-22 PROCEDURE — 99214 OFFICE O/P EST MOD 30 MIN: CPT | Performed by: INTERNAL MEDICINE

## 2022-08-22 RX ORDER — LISINOPRIL 20 MG/1
20 TABLET ORAL DAILY
Qty: 90 TABLET | Refills: 3 | Status: SHIPPED | OUTPATIENT
Start: 2022-08-22

## 2022-08-22 RX ORDER — ROSUVASTATIN CALCIUM 40 MG/1
40 TABLET, COATED ORAL EVERY EVENING
COMMUNITY

## 2022-08-22 NOTE — PROGRESS NOTES
[facility] CARDIOLOGY  [address], [address]  PHONE: [phone]      22    NAME:  Luz Pinzon  : 1955  MRN: [id number]       SUBJECTIVE:   Luz Pinzon is a 61 y.o. male seen for a follow up visit regarding the following:     Chief Complaint   Patient presents with    Follow-Up from Hospital    Coronary Artery Disease         HPI:    No cp or fernandez. No orthopnea or pnd. No palpitations or syncope. Feels better after recent pci svg to om    Past Medical History, Past Surgical History, Family history, Social History, and Medications were all reviewed with the patient today and updated as necessary. Current Outpatient Medications   Medication Sig Dispense Refill    rosuvastatin (CRESTOR) 40 MG tablet Take 40 mg by mouth every evening      lisinopril (PRINIVIL;ZESTRIL) 20 MG tablet Take 1 tablet by mouth daily 90 tablet 3    clopidogrel (PLAVIX) 75 MG tablet Take 1 tablet by mouth in the morning. 90 tablet 3    amiodarone (CORDARONE) 200 MG tablet Take 1 tablet by mouth daily 90 tablet 2    aspirin 81 MG EC tablet Take by mouth daily      carvedilol (COREG) 12.5 MG tablet Take 12.5 mg by mouth daily      diazePAM (VALIUM) 5 MG tablet Take 5 mg by mouth as needed. diphenhydrAMINE (BENADRYL) 25 MG capsule Take 25 mg by mouth every 6 hours as needed      fenofibrate (TRIGLIDE) 160 MG tablet TAKE 1 TABLET BY MOUTH ONCE DAILY      HYDROcodone-acetaminophen (NORCO)  MG per tablet Take 1 tablet by mouth every 4 hours as needed. omeprazole (PRILOSEC) 40 MG delayed release capsule Take 40 mg by mouth daily       No current facility-administered medications for this visit. Social History     Tobacco Use    Smoking status: Former     Packs/day: 0.50     Types: Cigarettes     Quit date: 2020     Years since quittin.6    Smokeless tobacco: Never   Substance Use Topics    Alcohol use:  No              PHYSICAL EXAM:   BP (!) 180/100 Pulse 76   Ht 6' (1.829 m)   Wt 252 lb (114.3 kg)   BMI 34.18 kg/m²    Constitutional: Oriented to person, place, and time. Appears well-developed and well-nourished. Head: Normocephalic and atraumatic. Neck: Neck supple. Cardiovascular: Normal rate and regular rhythm with no murmur -No JVP  Pulmonary/Chest: Breath sounds normal.   Abdominal: Soft. Musculoskeletal: No edema. Neurological: Alert and oriented to person, place, and time. Skin: Skin is warm and dry. Psychiatric: Normal mood and affect. Vitals reviewed. Wt Readings from Last 3 Encounters:   08/22/22 252 lb (114.3 kg)   08/06/22 245 lb 4.8 oz (111.3 kg)   07/20/22 244 lb (110.7 kg)       Medical problems and test results were reviewed with the patient today. ASSESSMENT and PLAN    1. PVD (peripheral vascular disease) (HCC)  Stable. Continue current medical therapy. AAA s/p Evar    2. Primary hypertension  Stable. Continue coreg and restart lisinopril 20mg qd      3. Pure hypercholesterolemia  Stable. Continue lipitor      4. Chronic systolic heart failure (HCC)  Stable. Continue coreg and restart lisinopril      5. Coronary artery disease involving native coronary artery of native heart without angina pectoris  Stable. Continue asa and crestor/tricor         Return in about 3 months (around 11/22/2022).          Dinah Blake MD  8/22/2022  10:42 AM

## 2022-08-25 DIAGNOSIS — Z45.02 ICD (IMPLANTABLE CARDIOVERTER-DEFIBRILLATOR) DISCHARGE: ICD-10-CM

## 2022-08-25 DIAGNOSIS — Z45.02 ICD (IMPLANTABLE CARDIOVERTER-DEFIBRILLATOR) DISCHARGE: Primary | ICD-10-CM

## 2022-09-16 NOTE — Clinical Note
Vessel: SVG (circumflex). Distal protection device inserted. Cimzia Counseling:  I discussed with the patient the risks of Cimzia including but not limited to immunosuppression, allergic reactions and infections.  The patient understands that monitoring is required including a PPD at baseline and must alert us or the primary physician if symptoms of infection or other concerning signs are noted.

## 2022-10-25 PROCEDURE — 93296 REM INTERROG EVL PM/IDS: CPT | Performed by: INTERNAL MEDICINE

## 2022-10-25 PROCEDURE — 93295 DEV INTERROG REMOTE 1/2/MLT: CPT | Performed by: INTERNAL MEDICINE

## 2022-11-11 ENCOUNTER — OFFICE VISIT (OUTPATIENT)
Dept: CARDIOLOGY CLINIC | Age: 67
End: 2022-11-11
Payer: MEDICARE

## 2022-11-11 VITALS
HEIGHT: 72 IN | HEART RATE: 70 BPM | BODY MASS INDEX: 34.63 KG/M2 | SYSTOLIC BLOOD PRESSURE: 130 MMHG | WEIGHT: 255.7 LBS | DIASTOLIC BLOOD PRESSURE: 74 MMHG

## 2022-11-11 DIAGNOSIS — I50.22 CHRONIC SYSTOLIC HEART FAILURE (HCC): ICD-10-CM

## 2022-11-11 DIAGNOSIS — E78.00 PURE HYPERCHOLESTEROLEMIA: Primary | ICD-10-CM

## 2022-11-11 DIAGNOSIS — I10 PRIMARY HYPERTENSION: ICD-10-CM

## 2022-11-11 DIAGNOSIS — I25.10 CORONARY ARTERY DISEASE INVOLVING NATIVE CORONARY ARTERY OF NATIVE HEART WITHOUT ANGINA PECTORIS: ICD-10-CM

## 2022-11-11 PROCEDURE — G8484 FLU IMMUNIZE NO ADMIN: HCPCS | Performed by: INTERNAL MEDICINE

## 2022-11-11 PROCEDURE — 3078F DIAST BP <80 MM HG: CPT | Performed by: INTERNAL MEDICINE

## 2022-11-11 PROCEDURE — G8417 CALC BMI ABV UP PARAM F/U: HCPCS | Performed by: INTERNAL MEDICINE

## 2022-11-11 PROCEDURE — 1036F TOBACCO NON-USER: CPT | Performed by: INTERNAL MEDICINE

## 2022-11-11 PROCEDURE — 3074F SYST BP LT 130 MM HG: CPT | Performed by: INTERNAL MEDICINE

## 2022-11-11 PROCEDURE — G8428 CUR MEDS NOT DOCUMENT: HCPCS | Performed by: INTERNAL MEDICINE

## 2022-11-11 PROCEDURE — 3017F COLORECTAL CA SCREEN DOC REV: CPT | Performed by: INTERNAL MEDICINE

## 2022-11-11 PROCEDURE — 99214 OFFICE O/P EST MOD 30 MIN: CPT | Performed by: INTERNAL MEDICINE

## 2022-11-11 RX ORDER — PANTOPRAZOLE SODIUM 40 MG/1
TABLET, DELAYED RELEASE ORAL
COMMUNITY
Start: 2022-10-11

## 2022-11-20 DIAGNOSIS — Z45.02 ICD (IMPLANTABLE CARDIOVERTER-DEFIBRILLATOR) DISCHARGE: ICD-10-CM

## 2022-12-07 DIAGNOSIS — Z45.02 ICD (IMPLANTABLE CARDIOVERTER-DEFIBRILLATOR) DISCHARGE: Primary | ICD-10-CM

## 2022-12-07 DIAGNOSIS — I50.22 CHRONIC SYSTOLIC HEART FAILURE (HCC): ICD-10-CM

## 2022-12-13 NOTE — TELEPHONE ENCOUNTER
Requested Prescriptions     Pending Prescriptions Disp Refills    amiodarone (CORDARONE) 200 MG tablet [Pharmacy Med Name: Amiodarone HCl 200 MG Oral Tablet] 90 tablet 2     Sig: Take 1 tablet by mouth once daily

## 2022-12-14 RX ORDER — AMIODARONE HYDROCHLORIDE 200 MG/1
TABLET ORAL
Qty: 90 TABLET | Refills: 2 | Status: SHIPPED | OUTPATIENT
Start: 2022-12-14

## 2023-02-02 ENCOUNTER — OFFICE VISIT (OUTPATIENT)
Dept: CARDIOLOGY CLINIC | Age: 68
End: 2023-02-02

## 2023-02-02 ENCOUNTER — RESEARCH ENCOUNTER (OUTPATIENT)
Dept: CARDIOLOGY CLINIC | Age: 68
End: 2023-02-02

## 2023-02-02 VITALS
HEART RATE: 67 BPM | SYSTOLIC BLOOD PRESSURE: 118 MMHG | BODY MASS INDEX: 34.75 KG/M2 | WEIGHT: 256.2 LBS | DIASTOLIC BLOOD PRESSURE: 74 MMHG

## 2023-02-02 DIAGNOSIS — Z00.6 PATIENT IN CLINICAL RESEARCH STUDY: Primary | ICD-10-CM

## 2023-02-02 DIAGNOSIS — I25.708 CORONARY ARTERY DISEASE OF BYPASS GRAFT OF NATIVE HEART WITH STABLE ANGINA PECTORIS (HCC): ICD-10-CM

## 2023-02-02 RX ORDER — EZETIMIBE 10 MG/1
10 TABLET ORAL DAILY
COMMUNITY

## 2023-02-02 NOTE — PROGRESS NOTES
[facility] CARDIOLOGY  [address], [address]  PHONE: [phone]      23    NAME:  Ashley Bonilla  : 1955  MRN: [id number]       SUBJECTIVE:   Ashley Bonilla is a 74 y.o. male seen for a follow up visit regarding the following:     Chief Complaint   Patient presents with    Research     Research Visit for AEGIS II EOS           HPI:    Some non-exertional cp. Left upper chest- Variable timing. Past Medical History, Past Surgical History, Family history, Social History, and Medications were all reviewed with the patient today and updated as necessary. Current Outpatient Medications   Medication Sig Dispense Refill    ezetimibe (ZETIA) 10 MG tablet Take 10 mg by mouth daily      amiodarone (CORDARONE) 200 MG tablet Take 1 tablet by mouth once daily 90 tablet 2    pantoprazole (PROTONIX) 40 MG tablet TAKE 1 TABLET BY MOUTH ONCE DAILY      pregabalin (LYRICA) 75 MG capsule Take 75 mg by mouth 2 times daily. rosuvastatin (CRESTOR) 40 MG tablet Take 40 mg by mouth every evening      lisinopril (PRINIVIL;ZESTRIL) 20 MG tablet Take 1 tablet by mouth daily 90 tablet 3    clopidogrel (PLAVIX) 75 MG tablet Take 1 tablet by mouth in the morning. 90 tablet 3    aspirin 81 MG EC tablet Take by mouth daily      carvedilol (COREG) 12.5 MG tablet Take 12.5 mg by mouth daily 1/2 tablet every day      diazePAM (VALIUM) 5 MG tablet Take 5 mg by mouth as needed. diphenhydrAMINE (BENADRYL) 25 MG capsule Take 25 mg by mouth every 6 hours as needed      fenofibrate (TRIGLIDE) 160 MG tablet TAKE 1 TABLET BY MOUTH ONCE DAILY      HYDROcodone-acetaminophen (NORCO)  MG per tablet Take 1 tablet by mouth every 4 hours as needed. No current facility-administered medications for this visit.                Social History     Tobacco Use    Smoking status: Former     Packs/day: 0.50     Types: Cigarettes     Quit date: 2020     Years since quitting: 3.0 Smokeless tobacco: Never   Substance Use Topics    Alcohol use: No              PHYSICAL EXAM:   /74 (Site: Right Upper Arm, Position: Sitting)   Pulse 67   Wt 256 lb 3.2 oz (116.2 kg)   BMI 34.75 kg/m²    Constitutional: Oriented to person, place, and time. Appears well-developed and well-nourished. Head: Normocephalic and atraumatic. Neck: Neck supple. Cardiovascular: Normal rate and regular rhythm with no murmur -No JVP  Pulmonary/Chest: Breath sounds normal.   Abdominal: Soft. Musculoskeletal: No edema. Neurological: Alert and oriented to person, place, and time. Skin: Skin is warm and dry. Psychiatric: Normal mood and affect. Vitals reviewed. Wt Readings from Last 3 Encounters:   02/02/23 256 lb 3.2 oz (116.2 kg)   11/11/22 255 lb 11.2 oz (116 kg)   10/18/22 256 lb (116.1 kg)       Medical problems and test results were reviewed with the patient today. ASSESSMENT and PLAN    1. Patient in clinical research study  Seen for final research visit    2. Coronary artery disease of bypass graft of native heart with stable angina pectoris [facility])  Unknown  - Nuclear stress test with myocardial perfusion; Future       No follow-ups on file.          Corinne Grace, MD  2/2/2023  9:07 AM

## 2023-02-02 NOTE — PROGRESS NOTES
[facility]au [facility] II Trial  Site [id number]  Subject [id number]  Date 02/02/23  Visit 11 - EOS      Patient here today for Visit 11/EOS of the AEGIS II Trial.  He is accompanied by his wife. The AEGIS II trial purpose, design, risks/benefits, and alternatives were reviewed with the patient. Patient was reminded that participation is voluntary and that he can withdraw from the study at any time. Patient verbalized understanding of the above information and expressed his desire to continue participation in the main study as well as the associated sub studies. Patient denies any questions or concerns regarding the study at this time. Protocol required questions were discussed and answered. Patient reported the following AEs/SAEs: 1)  Patient reports having intermittent chest pain brought on by exertion. He was seen by Dr. Nathaniel Schwartz today who ordered a nuclear stress test to evaluate this. 2) Dry Mouth  3) Fatigue  4) Diabetes Mellitus Type 2 - This information was obtained from records review - when I asked the patient about this dx he was unaware of this. Patient informed that information came from his last office visit note from Dr. Connor Julio on 12/2/22 which also stated he in on Metformin. Patient and wife stated that he was not told he is diabetic and he denies taking Metformin. Patient encouraged to follow up with Dr. Connor Julio to address this. He verbalized understanding. Conmed changes since last study visit:  Patient is taking Zetia 10 mg daily - start date 12/2/2022    Research Physical Exam:  See MD Note. Vital Signs:  Blood Pressure: 118/74  HR: 67  Weight 166.2 kg     Patient denied any complaints or concerns today regarding the study. He understands that this is his final visit for the study.   I thanked him for his participation

## 2023-03-06 NOTE — TELEPHONE ENCOUNTER
Requested Prescriptions     Pending Prescriptions Disp Refills    amiodarone (CORDARONE) 200 MG tablet 90 tablet 3     Sig: Take 1 tablet by mouth once daily    carvedilol (COREG) 12.5 MG tablet 90 tablet 3     Sig: Take 1 tablet by mouth daily

## 2023-03-06 NOTE — TELEPHONE ENCOUNTER
MEDICATION REFILL REQUEST      Name of Medication:  Carvedilol   Dose:  12.5 mg  Frequency:  daily   Quantity:    Days' supply:  90      Pharmacy Name/Location:  [address] [place] [address] REFILL REQUEST      Name of Medication:  Amiodarone  Dose:  200 mg  Frequency:  daily   Quantity:    Days' supply:  90      Pharmacy Name/Location:  [facility]

## 2023-03-08 RX ORDER — AMIODARONE HYDROCHLORIDE 200 MG/1
TABLET ORAL
Qty: 90 TABLET | Refills: 3 | Status: CANCELLED | OUTPATIENT
Start: 2023-03-08

## 2023-03-08 RX ORDER — CARVEDILOL 12.5 MG/1
6.25 TABLET ORAL DAILY
Qty: 45 TABLET | Refills: 3 | Status: SHIPPED | OUTPATIENT
Start: 2023-03-08

## 2023-03-10 DIAGNOSIS — Z45.02 ICD (IMPLANTABLE CARDIOVERTER-DEFIBRILLATOR) DISCHARGE: ICD-10-CM

## 2023-05-12 ENCOUNTER — OFFICE VISIT (OUTPATIENT)
Age: 68
End: 2023-05-12

## 2023-05-12 VITALS
HEART RATE: 76 BPM | BODY MASS INDEX: 33.59 KG/M2 | SYSTOLIC BLOOD PRESSURE: 126 MMHG | HEIGHT: 72 IN | DIASTOLIC BLOOD PRESSURE: 78 MMHG | WEIGHT: 248 LBS

## 2023-05-12 DIAGNOSIS — E78.00 PURE HYPERCHOLESTEROLEMIA: Primary | ICD-10-CM

## 2023-05-12 DIAGNOSIS — Z95.1 S/P CABG (CORONARY ARTERY BYPASS GRAFT): ICD-10-CM

## 2023-05-12 DIAGNOSIS — I10 PRIMARY HYPERTENSION: ICD-10-CM

## 2023-05-12 DIAGNOSIS — I50.22 CHRONIC SYSTOLIC HEART FAILURE (HCC): ICD-10-CM

## 2023-05-12 RX ORDER — ROSUVASTATIN CALCIUM 40 MG/1
40 TABLET, COATED ORAL EVERY EVENING
Qty: 90 TABLET | Refills: 3 | Status: SHIPPED | OUTPATIENT
Start: 2023-05-12

## 2023-05-12 RX ORDER — ORAL SEMAGLUTIDE 3 MG/1
TABLET ORAL
COMMUNITY
Start: 2023-03-07

## 2023-05-12 RX ORDER — LISINOPRIL 20 MG/1
20 TABLET ORAL DAILY
Qty: 90 TABLET | Refills: 3 | Status: SHIPPED | OUTPATIENT
Start: 2023-05-12

## 2023-05-12 RX ORDER — CLOPIDOGREL BISULFATE 75 MG/1
75 TABLET ORAL DAILY
Qty: 90 TABLET | Refills: 3 | Status: SHIPPED | OUTPATIENT
Start: 2023-05-12

## 2023-05-12 NOTE — PROGRESS NOTES
[facility] CARDIOLOGY  [address], [address] [facility], [address] [place] [address]  PHONE: [phone]      23    NAME:  Jared Hughes  : 1955  MRN: [id number]       SUBJECTIVE:   Jared Hughes is a 74 y.o. male seen for a follow up visit regarding the following:     Chief Complaint   Patient presents with    6 Month Follow-Up    Hyperlipidemia    Congestive Heart Failure    Coronary Artery Disease    Hypertension         HPI:    No cp. No orthopnea or pnd. No palpitations or syncope. ALANIZ at baseline. Past Medical History, Past Surgical History, Family history, Social History, and Medications were all reviewed with the patient today and updated as necessary. Current Outpatient Medications   Medication Sig Dispense Refill    RYBELSUS 3 MG TABS TAKE 1 TABLET BY MOUTH ONCE DAILY TAKE WITH 4 OUNCES OF WATER SEPERATE FROM OTHER MEDICATIONS AND FOOD/DRINK BY 30 MINUTES      carvedilol (COREG) 12.5 MG tablet Take 0.5 tablets by mouth daily 45 tablet 3    amiodarone (CORDARONE) 200 MG tablet Take 1 tablet by mouth once daily 90 tablet 2    pantoprazole (PROTONIX) 40 MG tablet TAKE 1 TABLET BY MOUTH ONCE DAILY      pregabalin (LYRICA) 75 MG capsule Take 1 capsule by mouth 2 times daily. rosuvastatin (CRESTOR) 40 MG tablet Take 1 tablet by mouth every evening      lisinopril (PRINIVIL;ZESTRIL) 20 MG tablet Take 1 tablet by mouth daily 90 tablet 3    clopidogrel (PLAVIX) 75 MG tablet Take 1 tablet by mouth in the morning. 90 tablet 3    aspirin 81 MG EC tablet Take by mouth daily      diazePAM (VALIUM) 5 MG tablet Take 1 tablet by mouth as needed. diphenhydrAMINE (BENADRYL) 25 MG capsule Take 1 capsule by mouth every 6 hours as needed      fenofibrate (TRIGLIDE) 160 MG tablet TAKE 1 TABLET BY MOUTH ONCE DAILY      HYDROcodone-acetaminophen (NORCO)  MG per tablet Take 1 tablet by mouth every 4 hours as needed.       ezetimibe (ZETIA) 10 MG tablet Take 10 mg by mouth daily (Patient

## 2023-06-02 ENCOUNTER — OFFICE VISIT (OUTPATIENT)
Dept: VASCULAR SURGERY | Age: 68
End: 2023-06-02
Payer: MEDICARE

## 2023-06-02 VITALS
WEIGHT: 243 LBS | HEART RATE: 63 BPM | BODY MASS INDEX: 32.91 KG/M2 | SYSTOLIC BLOOD PRESSURE: 119 MMHG | OXYGEN SATURATION: 95 % | HEIGHT: 72 IN | DIASTOLIC BLOOD PRESSURE: 72 MMHG

## 2023-06-02 DIAGNOSIS — I73.9 PVD (PERIPHERAL VASCULAR DISEASE) WITH CLAUDICATION (HCC): Primary | ICD-10-CM

## 2023-06-02 PROCEDURE — 3074F SYST BP LT 130 MM HG: CPT | Performed by: SURGERY

## 2023-06-02 PROCEDURE — 3017F COLORECTAL CA SCREEN DOC REV: CPT | Performed by: SURGERY

## 2023-06-02 PROCEDURE — 99213 OFFICE O/P EST LOW 20 MIN: CPT | Performed by: SURGERY

## 2023-06-02 PROCEDURE — 3078F DIAST BP <80 MM HG: CPT | Performed by: SURGERY

## 2023-06-02 PROCEDURE — G8417 CALC BMI ABV UP PARAM F/U: HCPCS | Performed by: SURGERY

## 2023-06-02 PROCEDURE — 1123F ACP DISCUSS/DSCN MKR DOCD: CPT | Performed by: SURGERY

## 2023-06-02 PROCEDURE — 1036F TOBACCO NON-USER: CPT | Performed by: SURGERY

## 2023-06-02 PROCEDURE — G8427 DOCREV CUR MEDS BY ELIG CLIN: HCPCS | Performed by: SURGERY

## 2023-06-02 NOTE — PROGRESS NOTES
[address]   [address] [facility] FAX: [phone]    Darline Hines  : 1955    Chief Complaint:   History of Present Illness:   Patient follows up today for presents with chronic pain in both legs. Has history for vascular disease status post endovascular pair abdominal aortic aneurysm back in  he had bilateral femoral to anterior tibial bypasses which are chronically occluded. He describes pain most of the bottom of his feet. He denies any claudication symptoms. CURRENT MEDICATIONS:  Current Outpatient Medications   Medication Sig Dispense Refill    RYBELSUS 3 MG TABS TAKE 1 TABLET BY MOUTH ONCE DAILY TAKE WITH 4 OUNCES OF WATER SEPERATE FROM OTHER MEDICATIONS AND FOOD/DRINK BY 30 MINUTES      lisinopril (PRINIVIL;ZESTRIL) 20 MG tablet Take 1 tablet by mouth daily 90 tablet 3    clopidogrel (PLAVIX) 75 MG tablet Take 1 tablet by mouth daily 90 tablet 3    rosuvastatin (CRESTOR) 40 MG tablet Take 1 tablet by mouth every evening 90 tablet 3    carvedilol (COREG) 12.5 MG tablet Take 0.5 tablets by mouth daily 45 tablet 3    amiodarone (CORDARONE) 200 MG tablet Take 1 tablet by mouth once daily 90 tablet 2    pantoprazole (PROTONIX) 40 MG tablet TAKE 1 TABLET BY MOUTH ONCE DAILY      aspirin 81 MG EC tablet Take by mouth daily      diazePAM (VALIUM) 5 MG tablet Take 1 tablet by mouth as needed. diphenhydrAMINE (BENADRYL) 25 MG capsule Take 1 capsule by mouth every 6 hours as needed      fenofibrate (TRIGLIDE) 160 MG tablet TAKE 1 TABLET BY MOUTH ONCE DAILY      HYDROcodone-acetaminophen (NORCO)  MG per tablet Take 1 tablet by mouth every 4 hours as needed. ezetimibe (ZETIA) 10 MG tablet Take 10 mg by mouth daily (Patient not taking: Reported on 2023)      pregabalin (LYRICA) 75 MG capsule Take 1 capsule by mouth 2 times daily.  (Patient not taking: Reported on 2023)       No current facility-administered medications for this

## 2023-08-01 DIAGNOSIS — Z45.02 ICD (IMPLANTABLE CARDIOVERTER-DEFIBRILLATOR) DISCHARGE: ICD-10-CM

## 2023-08-09 PROCEDURE — 93295 DEV INTERROG REMOTE 1/2/MLT: CPT | Performed by: INTERNAL MEDICINE

## 2023-08-09 PROCEDURE — 93296 REM INTERROG EVL PM/IDS: CPT | Performed by: INTERNAL MEDICINE

## 2023-09-21 ENCOUNTER — OFFICE VISIT (OUTPATIENT)
Dept: VASCULAR SURGERY | Age: 68
End: 2023-09-21
Payer: MEDICARE

## 2023-09-21 VITALS
SYSTOLIC BLOOD PRESSURE: 122 MMHG | BODY MASS INDEX: 34 KG/M2 | HEIGHT: 72 IN | HEART RATE: 67 BPM | DIASTOLIC BLOOD PRESSURE: 80 MMHG | WEIGHT: 251 LBS | OXYGEN SATURATION: 91 %

## 2023-09-21 DIAGNOSIS — R22.42 MASS OF LEFT LOWER LEG: ICD-10-CM

## 2023-09-21 PROCEDURE — 3017F COLORECTAL CA SCREEN DOC REV: CPT | Performed by: NURSE PRACTITIONER

## 2023-09-21 PROCEDURE — 99213 OFFICE O/P EST LOW 20 MIN: CPT | Performed by: NURSE PRACTITIONER

## 2023-09-21 PROCEDURE — 3074F SYST BP LT 130 MM HG: CPT | Performed by: NURSE PRACTITIONER

## 2023-09-21 PROCEDURE — G8417 CALC BMI ABV UP PARAM F/U: HCPCS | Performed by: NURSE PRACTITIONER

## 2023-09-21 PROCEDURE — 3079F DIAST BP 80-89 MM HG: CPT | Performed by: NURSE PRACTITIONER

## 2023-09-21 PROCEDURE — G8427 DOCREV CUR MEDS BY ELIG CLIN: HCPCS | Performed by: NURSE PRACTITIONER

## 2023-09-21 PROCEDURE — 1123F ACP DISCUSS/DSCN MKR DOCD: CPT | Performed by: NURSE PRACTITIONER

## 2023-09-21 PROCEDURE — 1036F TOBACCO NON-USER: CPT | Performed by: NURSE PRACTITIONER

## 2023-11-08 PROCEDURE — 93296 REM INTERROG EVL PM/IDS: CPT | Performed by: INTERNAL MEDICINE

## 2023-11-08 PROCEDURE — 93295 DEV INTERROG REMOTE 1/2/MLT: CPT | Performed by: INTERNAL MEDICINE

## 2023-11-14 ENCOUNTER — OFFICE VISIT (OUTPATIENT)
Age: 68
End: 2023-11-14
Payer: MEDICARE

## 2023-11-14 ENCOUNTER — NURSE ONLY (OUTPATIENT)
Age: 68
End: 2023-11-14

## 2023-11-14 VITALS
SYSTOLIC BLOOD PRESSURE: 120 MMHG | HEART RATE: 60 BPM | DIASTOLIC BLOOD PRESSURE: 80 MMHG | WEIGHT: 249 LBS | BODY MASS INDEX: 33.72 KG/M2 | HEIGHT: 72 IN

## 2023-11-14 DIAGNOSIS — Z45.02 ICD (IMPLANTABLE CARDIOVERTER-DEFIBRILLATOR) DISCHARGE: Primary | ICD-10-CM

## 2023-11-14 DIAGNOSIS — I50.22 CHRONIC SYSTOLIC HEART FAILURE (HCC): ICD-10-CM

## 2023-11-14 DIAGNOSIS — E78.00 PURE HYPERCHOLESTEROLEMIA: ICD-10-CM

## 2023-11-14 DIAGNOSIS — I10 PRIMARY HYPERTENSION: ICD-10-CM

## 2023-11-14 DIAGNOSIS — I25.10 CORONARY ARTERY DISEASE INVOLVING NATIVE CORONARY ARTERY OF NATIVE HEART WITHOUT ANGINA PECTORIS: Primary | ICD-10-CM

## 2023-11-14 PROCEDURE — G8428 CUR MEDS NOT DOCUMENT: HCPCS | Performed by: INTERNAL MEDICINE

## 2023-11-14 PROCEDURE — 3074F SYST BP LT 130 MM HG: CPT | Performed by: INTERNAL MEDICINE

## 2023-11-14 PROCEDURE — G8417 CALC BMI ABV UP PARAM F/U: HCPCS | Performed by: INTERNAL MEDICINE

## 2023-11-14 PROCEDURE — 3017F COLORECTAL CA SCREEN DOC REV: CPT | Performed by: INTERNAL MEDICINE

## 2023-11-14 PROCEDURE — 1123F ACP DISCUSS/DSCN MKR DOCD: CPT | Performed by: INTERNAL MEDICINE

## 2023-11-14 PROCEDURE — 99214 OFFICE O/P EST MOD 30 MIN: CPT | Performed by: INTERNAL MEDICINE

## 2023-11-14 PROCEDURE — 1036F TOBACCO NON-USER: CPT | Performed by: INTERNAL MEDICINE

## 2023-11-14 PROCEDURE — 3079F DIAST BP 80-89 MM HG: CPT | Performed by: INTERNAL MEDICINE

## 2023-11-14 PROCEDURE — G8484 FLU IMMUNIZE NO ADMIN: HCPCS | Performed by: INTERNAL MEDICINE

## 2023-11-14 NOTE — PROGRESS NOTES
[facility] CARDIOLOGY  [address] [place] [address]  PHONE: [phone]      23    NAME:  Mora Benson  : 1955  MRN: [id number]       SUBJECTIVE:   Mora Benson is a 74 y.o. male seen for a follow up visit regarding the following:     Chief Complaint   Patient presents with    Coronary Artery Disease         HPI:    No fernandez. No orthopnea or pnd. No palpitations or syncope. Occasional chest pain twinge. Past Medical History, Past Surgical History, Family history, Social History, and Medications were all reviewed with the patient today and updated as necessary. Current Outpatient Medications   Medication Sig Dispense Refill    RYBELSUS 3 MG TABS TAKE 1 TABLET BY MOUTH ONCE DAILY TAKE WITH 4 OUNCES OF WATER SEPERATE FROM OTHER MEDICATIONS AND FOOD/DRINK BY 30 MINUTES      lisinopril (PRINIVIL;ZESTRIL) 20 MG tablet Take 1 tablet by mouth daily 90 tablet 3    clopidogrel (PLAVIX) 75 MG tablet Take 1 tablet by mouth daily 90 tablet 3    rosuvastatin (CRESTOR) 40 MG tablet Take 1 tablet by mouth every evening 90 tablet 3    carvedilol (COREG) 12.5 MG tablet Take 0.5 tablets by mouth daily 45 tablet 3    ezetimibe (ZETIA) 10 MG tablet Take 1 tablet by mouth daily      amiodarone (CORDARONE) 200 MG tablet Take 1 tablet by mouth once daily 90 tablet 2    pantoprazole (PROTONIX) 40 MG tablet TAKE 1 TABLET BY MOUTH ONCE DAILY      aspirin 81 MG EC tablet Take by mouth daily      diazePAM (VALIUM) 5 MG tablet Take 1 tablet by mouth as needed. diphenhydrAMINE (BENADRYL) 25 MG capsule Take 1 capsule by mouth every 6 hours as needed      fenofibrate (TRIGLIDE) 160 MG tablet TAKE 1 TABLET BY MOUTH ONCE DAILY      HYDROcodone-acetaminophen (NORCO)  MG per tablet Take 1 tablet by mouth every 4 hours as needed. No current facility-administered medications for this visit.                Social History     Tobacco Use    Smoking status: Former     Packs/day:

## 2023-11-27 RX ORDER — AMIODARONE HYDROCHLORIDE 200 MG/1
TABLET ORAL
Qty: 90 TABLET | Refills: 1 | Status: SHIPPED | OUTPATIENT
Start: 2023-11-27

## 2023-12-12 ENCOUNTER — OFFICE VISIT (OUTPATIENT)
Dept: VASCULAR SURGERY | Age: 68
End: 2023-12-12
Payer: MEDICARE

## 2023-12-12 VITALS
OXYGEN SATURATION: 93 % | HEART RATE: 60 BPM | DIASTOLIC BLOOD PRESSURE: 84 MMHG | WEIGHT: 252 LBS | BODY MASS INDEX: 34.13 KG/M2 | SYSTOLIC BLOOD PRESSURE: 152 MMHG | HEIGHT: 72 IN

## 2023-12-12 DIAGNOSIS — I73.9 PVD (PERIPHERAL VASCULAR DISEASE) WITH CLAUDICATION (HCC): Primary | ICD-10-CM

## 2023-12-12 PROCEDURE — G8417 CALC BMI ABV UP PARAM F/U: HCPCS | Performed by: SURGERY

## 2023-12-12 PROCEDURE — 3017F COLORECTAL CA SCREEN DOC REV: CPT | Performed by: SURGERY

## 2023-12-12 PROCEDURE — G8484 FLU IMMUNIZE NO ADMIN: HCPCS | Performed by: SURGERY

## 2023-12-12 PROCEDURE — 1036F TOBACCO NON-USER: CPT | Performed by: SURGERY

## 2023-12-12 PROCEDURE — G8427 DOCREV CUR MEDS BY ELIG CLIN: HCPCS | Performed by: SURGERY

## 2023-12-12 PROCEDURE — 1123F ACP DISCUSS/DSCN MKR DOCD: CPT | Performed by: SURGERY

## 2023-12-12 PROCEDURE — 3079F DIAST BP 80-89 MM HG: CPT | Performed by: SURGERY

## 2023-12-12 PROCEDURE — 3077F SYST BP >= 140 MM HG: CPT | Performed by: SURGERY

## 2023-12-12 PROCEDURE — 99213 OFFICE O/P EST LOW 20 MIN: CPT | Performed by: SURGERY

## 2023-12-12 NOTE — PROGRESS NOTES
[address]   [address] FAX: [phone]    Enid Abad  : 1955    Chief Complaint:     History of Present Illness:   Patient follows up today for duplex study. Patient denies any claudication or rest pain symptoms. CURRENT MEDICATIONS:  Current Outpatient Medications   Medication Sig Dispense Refill    amiodarone (CORDARONE) 200 MG tablet Take 1 tablet by mouth once daily 90 tablet 1    RYBELSUS 3 MG TABS TAKE 1 TABLET BY MOUTH ONCE DAILY TAKE WITH 4 OUNCES OF WATER SEPERATE FROM OTHER MEDICATIONS AND FOOD/DRINK BY 30 MINUTES      lisinopril (PRINIVIL;ZESTRIL) 20 MG tablet Take 1 tablet by mouth daily 90 tablet 3    clopidogrel (PLAVIX) 75 MG tablet Take 1 tablet by mouth daily 90 tablet 3    rosuvastatin (CRESTOR) 40 MG tablet Take 1 tablet by mouth every evening 90 tablet 3    carvedilol (COREG) 12.5 MG tablet Take 0.5 tablets by mouth daily 45 tablet 3    ezetimibe (ZETIA) 10 MG tablet Take 1 tablet by mouth daily      pantoprazole (PROTONIX) 40 MG tablet TAKE 1 TABLET BY MOUTH ONCE DAILY      aspirin 81 MG EC tablet Take by mouth daily      diazePAM (VALIUM) 5 MG tablet Take 1 tablet by mouth as needed. diphenhydrAMINE (BENADRYL) 25 MG capsule Take 1 capsule by mouth every 6 hours as needed      fenofibrate (TRIGLIDE) 160 MG tablet TAKE 1 TABLET BY MOUTH ONCE DAILY      HYDROcodone-acetaminophen (NORCO)  MG per tablet Take 1 tablet by mouth every 4 hours as needed. No current facility-administered medications for this visit.        Past Medical History:   Diagnosis Date    AAA (abdominal aortic aneurysm) ([address]) 2011 AORTIC ABDOMINAL ANUERYSM REPAIR ENDOVASCULAR ([facility])-  Monique Bui 4.6cm on US 11     Acute myocardial infarction [facility])     MI 2/10 - Quad bypass 2/25/10 ,2016    Anticoagulation monitoring, INR range 2-3 10/5/2011    Anxiety 11/3/2011    Arthritis 11/3/2011    CAD (coronary

## 2024-02-12 ENCOUNTER — HOSPITAL ENCOUNTER (EMERGENCY)
Age: 69
Discharge: HOME | End: 2024-02-13
Payer: MEDICARE

## 2024-02-12 ENCOUNTER — APPOINTMENT (OUTPATIENT)
Dept: CT IMAGING | Age: 69
End: 2024-02-12
Payer: MEDICARE

## 2024-02-12 DIAGNOSIS — M54.6 ACUTE RIGHT-SIDED THORACIC BACK PAIN: ICD-10-CM

## 2024-02-12 DIAGNOSIS — R91.8 GROUND GLASS OPACITY PRESENT ON IMAGING OF LUNG: Primary | ICD-10-CM

## 2024-02-12 DIAGNOSIS — N40.0 ENLARGED PROSTATE: ICD-10-CM

## 2024-02-12 DIAGNOSIS — K57.90 DIVERTICULOSIS: ICD-10-CM

## 2024-02-12 LAB
ALBUMIN SERPL-MCNC: 4 G/DL (ref 3.2–4.6)
ALBUMIN/GLOB SERPL: 1.5 (ref 0.4–1.6)
ALP SERPL-CCNC: 85 U/L (ref 45–117)
ALT SERPL-CCNC: 19 U/L (ref 13–61)
ANION GAP SERPL CALC-SCNC: 11 MMOL/L (ref 2–11)
APPEARANCE UR: CLEAR
AST SERPL-CCNC: 15 U/L (ref 15–37)
BACTERIA URNS QL MICRO: 0 /HPF
BASOPHILS # BLD: 0.1 K/UL (ref 0–0.2)
BASOPHILS NFR BLD: 1 % (ref 0–2)
BILIRUB SERPL-MCNC: 0.2 MG/DL (ref 0.2–1.1)
BILIRUB UR QL: NEGATIVE
BUN SERPL-MCNC: 17 MG/DL (ref 8–23)
CALCIUM SERPL-MCNC: 9.3 MG/DL (ref 8.3–10.4)
CASTS URNS QL MICRO: 0 /LPF
CHLORIDE SERPL-SCNC: 108 MMOL/L (ref 98–107)
CO2 SERPL-SCNC: 22 MMOL/L (ref 21–32)
COLOR UR: YELLOW
CREAT SERPL-MCNC: 1.28 MG/DL (ref 0.8–1.5)
CRYSTALS URNS QL MICRO: 0 /LPF
DIFFERENTIAL METHOD BLD: ABNORMAL
EOSINOPHIL # BLD: 0.3 K/UL (ref 0–0.8)
EOSINOPHIL NFR BLD: 4 % (ref 0.5–7.8)
EPI CELLS #/AREA URNS HPF: NORMAL /HPF
ERYTHROCYTE [DISTWIDTH] IN BLOOD BY AUTOMATED COUNT: 13.1 % (ref 11.9–14.6)
GLOBULIN SER CALC-MCNC: 2.6 G/DL (ref 2.8–4.5)
GLUCOSE SERPL-MCNC: 204 MG/DL (ref 65–100)
GLUCOSE UR STRIP.AUTO-MCNC: >1000 MG/DL
HCT VFR BLD AUTO: 47.2 % (ref 41.1–50.3)
HGB BLD-MCNC: 15.5 G/DL (ref 13.6–17.2)
HGB UR QL STRIP: ABNORMAL
IMM GRANULOCYTES # BLD AUTO: 0.1 K/UL (ref 0–0.5)
IMM GRANULOCYTES NFR BLD AUTO: 1 % (ref 0–5)
KETONES UR QL STRIP.AUTO: NEGATIVE MG/DL
LEUKOCYTE ESTERASE UR QL STRIP.AUTO: NEGATIVE
LIPASE SERPL-CCNC: 57 U/L (ref 13–60)
LYMPHOCYTES # BLD: 2.8 K/UL (ref 0.5–4.6)
LYMPHOCYTES NFR BLD: 31 % (ref 13–44)
MCH RBC QN AUTO: 28.9 PG (ref 26.1–32.9)
MCHC RBC AUTO-ENTMCNC: 32.8 G/DL (ref 31.4–35)
MCV RBC AUTO: 88.1 FL (ref 82–102)
MONOCYTES # BLD: 0.8 K/UL (ref 0.1–1.3)
MONOCYTES NFR BLD: 8 % (ref 4–12)
MUCOUS THREADS URNS QL MICRO: 0 /LPF
NEUTS SEG # BLD: 5.1 K/UL (ref 1.7–8.2)
NEUTS SEG NFR BLD: 56 % (ref 43–78)
NITRITE UR QL STRIP.AUTO: NEGATIVE
NRBC # BLD: 0 K/UL (ref 0–0.2)
OTHER OBSERVATIONS: NORMAL
PH UR STRIP: 6 (ref 5–9)
PLATELET # BLD AUTO: 233 K/UL (ref 150–450)
PMV BLD AUTO: 9.1 FL (ref 9.4–12.3)
POTASSIUM SERPL-SCNC: 4.2 MMOL/L (ref 3.5–5.1)
PROT SERPL-MCNC: 6.6 G/DL (ref 6.4–8.2)
PROT UR STRIP-MCNC: NEGATIVE MG/DL
RBC # BLD AUTO: 5.36 M/UL (ref 4.23–5.6)
RBC #/AREA URNS HPF: NORMAL /HPF
SODIUM SERPL-SCNC: 141 MMOL/L (ref 133–143)
SP GR UR REFRACTOMETRY: 1.02 (ref 1–1.02)
UROBILINOGEN UR QL STRIP.AUTO: 0.2 EU/DL (ref 0.2–1)
WBC # BLD AUTO: 9.1 K/UL (ref 4.3–11.1)
WBC URNS QL MICRO: NORMAL /HPF

## 2024-02-12 PROCEDURE — 83690 ASSAY OF LIPASE: CPT

## 2024-02-12 PROCEDURE — 71275 CT ANGIOGRAPHY CHEST: CPT

## 2024-02-12 PROCEDURE — 99285 EMERGENCY DEPT VISIT HI MDM: CPT

## 2024-02-12 PROCEDURE — 80053 COMPREHEN METABOLIC PANEL: CPT

## 2024-02-12 PROCEDURE — 6360000004 HC RX CONTRAST MEDICATION

## 2024-02-12 PROCEDURE — 85025 COMPLETE CBC W/AUTO DIFF WBC: CPT

## 2024-02-12 PROCEDURE — 96375 TX/PRO/DX INJ NEW DRUG ADDON: CPT

## 2024-02-12 PROCEDURE — 96374 THER/PROPH/DIAG INJ IV PUSH: CPT

## 2024-02-12 PROCEDURE — 81001 URINALYSIS AUTO W/SCOPE: CPT

## 2024-02-12 RX ORDER — ONDANSETRON 2 MG/ML
4 INJECTION INTRAMUSCULAR; INTRAVENOUS
Status: COMPLETED | OUTPATIENT
Start: 2024-02-13 | End: 2024-02-13

## 2024-02-12 RX ORDER — HYDROMORPHONE HYDROCHLORIDE 1 MG/ML
0.5 INJECTION, SOLUTION INTRAMUSCULAR; INTRAVENOUS; SUBCUTANEOUS
Status: COMPLETED | OUTPATIENT
Start: 2024-02-13 | End: 2024-02-13

## 2024-02-12 RX ADMIN — IOPAMIDOL 100 ML: 755 INJECTION, SOLUTION INTRAVENOUS at 23:54

## 2024-02-13 VITALS
HEART RATE: 55 BPM | WEIGHT: 250 LBS | BODY MASS INDEX: 33.86 KG/M2 | SYSTOLIC BLOOD PRESSURE: 144 MMHG | RESPIRATION RATE: 20 BRPM | HEIGHT: 72 IN | DIASTOLIC BLOOD PRESSURE: 89 MMHG | OXYGEN SATURATION: 96 % | TEMPERATURE: 98.1 F

## 2024-02-13 PROCEDURE — 6370000000 HC RX 637 (ALT 250 FOR IP)

## 2024-02-13 PROCEDURE — 6360000002 HC RX W HCPCS

## 2024-02-13 RX ORDER — DEXAMETHASONE SODIUM PHOSPHATE 10 MG/ML
10 INJECTION INTRAMUSCULAR; INTRAVENOUS
Status: COMPLETED | OUTPATIENT
Start: 2024-02-13 | End: 2024-02-13

## 2024-02-13 RX ORDER — METHOCARBAMOL 750 MG/1
750 TABLET, FILM COATED ORAL
Status: COMPLETED | OUTPATIENT
Start: 2024-02-13 | End: 2024-02-13

## 2024-02-13 RX ORDER — LIDOCAINE 50 MG/G
1 PATCH TOPICAL DAILY
Qty: 10 PATCH | Refills: 0 | Status: SHIPPED | OUTPATIENT
Start: 2024-02-13 | End: 2024-02-23

## 2024-02-13 RX ORDER — METHOCARBAMOL 750 MG/1
750 TABLET, FILM COATED ORAL 4 TIMES DAILY
Qty: 40 TABLET | Refills: 0 | Status: SHIPPED | OUTPATIENT
Start: 2024-02-13 | End: 2024-02-23

## 2024-02-13 RX ORDER — DOXYCYCLINE HYCLATE 100 MG
100 TABLET ORAL 2 TIMES DAILY
Qty: 14 TABLET | Refills: 0 | Status: SHIPPED | OUTPATIENT
Start: 2024-02-13 | End: 2024-02-20

## 2024-02-13 RX ORDER — PREDNISONE 20 MG/1
20 TABLET ORAL
Qty: 5 TABLET | Refills: 0 | Status: SHIPPED | OUTPATIENT
Start: 2024-02-13 | End: 2024-02-18

## 2024-02-13 RX ORDER — LIDOCAINE 4 G/G
1 PATCH TOPICAL ONCE
Status: DISCONTINUED | OUTPATIENT
Start: 2024-02-13 | End: 2024-02-13 | Stop reason: HOSPADM

## 2024-02-13 RX ADMIN — METHOCARBAMOL 750 MG: 750 TABLET ORAL at 00:47

## 2024-02-13 RX ADMIN — DEXAMETHASONE SODIUM PHOSPHATE 10 MG: 10 INJECTION INTRAMUSCULAR; INTRAVENOUS at 00:45

## 2024-02-13 RX ADMIN — ONDANSETRON 4 MG: 2 INJECTION INTRAMUSCULAR; INTRAVENOUS at 00:11

## 2024-02-13 RX ADMIN — HYDROMORPHONE HYDROCHLORIDE 0.5 MG: 1 INJECTION, SOLUTION INTRAMUSCULAR; INTRAVENOUS; SUBCUTANEOUS at 00:10

## 2024-02-13 NOTE — ED NOTES
I have reviewed discharge instructions with the patient and spouse.  The patient and spouse verbalized understanding.    Patient left ED via Discharge Method: ambulatory to Home with wife    Opportunity for questions and clarification provided.       Patient given 4 scripts.         To continue your aftercare when you leave the hospital, you may receive an automated call from our care team to check in on how you are doing.  This is a free service and part of our promise to provide the best care and service to meet your aftercare needs.” If you have questions, or wish to unsubscribe from this service please call [phone].  Thank you for Choosing our [facility] Emergency Department.

## 2024-02-13 NOTE — ED PROVIDER NOTES
aortic aneurysm measuring 3.8 cm in diameter. Aortobiiliac iliac  stent. No evidence of aortic dissection. Diffuse atherosclerotic vascular  disease. Major aortic branches are patent.     IMPRESSION:  No evidence of pulmonary embolism. Mild diffuse groundglass pulmonary opacities,  nonspecific although correlate clinically for possible mild infectious or  inflammatory process.     No evidence of acute vascular pathology in the chest, abdomen, or pelvis.     Colonic diverticulosis without evidence of inflammation.     Enlarged prostate. Correlate with patient's PSA.   [JG]      ED Course User Index  [JG] Kiley Blanc PA           Risk of Complications and/or Morbidity of Patient Management:  Over the counter drug management performed.  Prescription drug management performed.  Parental controlled substances given in the ED.  Patient was discharged risks and benefits of hospitalization were considered.  Chronic medical problems impacting care include on Plavix.  Shared medical decision making was utilized in creating the patients health plan today.        Is this patient to be included in the SEP-1 core measure due to severe sepsis or septic shock? No Exclusion criteria - the patient is NOT to be included for SEP-1 Core Measure due to: 2+ SIRS criteria are not met      History      68-year-old male with history of hyperlipidemia, tobacco abuse, CAD, claudication, AAA with stenting, peripheral vascular disease, hypertension, cholecystectomy, CABG presents to the emergency department today accompanied by wife with chief complaint of 1 month of right-sided back pain that has been gradually getting worse.  Patient states in the last 4 days pain has become severe.  States pain radiates around to his abdomen.  Denies any chest pain or shortness of breath denies any low back pain.  States he sees pain management for chronic pain and he takes 10 mg Norco's however 10 mg Norco is not helping his pain.  Has not taken a

## 2024-02-13 NOTE — DISCHARGE INSTRUCTIONS
You were evaluated in the emergency department today for right-sided back pain    Lab work today is reassuring without any emergent findings    Scan of your chest and your abdomen and your pelvis is negative for any signs of a dissection.  Aneurysm looks stable.    You do have some groundglass opacities within your lungs which could be infectious or it could be inflammatory.  Due to your age I have written you prescription for doxycycline antibiotic to cover for any infectious process    You were given a dose of Decadron steroid here along with Robaxin muscle relaxer.    I have written you for a 5% lidocaine patch.  These patches can be worn for 12 hours and you need 12 hours without wearing the patch      I have written you prescription for Robaxin.  This is a muscle relaxer.  Take caution until you understand of this medication affects you.  Do not drink alcohol in this medication.  This medication might cause sleepiness, grogginess, fogginess.  Recommend that you do not take this exact same time as you take your Norco as this may cause extra sleepiness    I have written you a prescription for prednisone steroid.  Take this in the morning with food.  Sometimes this will increase her blood sugar.  Review of your chart shows that you do have a history of diabetes.    Contact your primary care provider tomorrow to let them know you are seen in the emergency department for this pain.  That you should have a follow-up within the next 1 to 2 weeks    Contact your pain management provider as well to schedule an ER follow-up.    Return to the emergency department for new or worsening pain, chest pain, shortness of breath, signs and symptoms of stroke as listed in your discharge paperwork

## 2024-02-13 NOTE — ED TRIAGE NOTES
X1 month of intermittent right lower back /flank pain. Reports today's its radiating to the front abdomen, denies n/v/d/urinary s/s.

## 2024-02-21 ENCOUNTER — OFFICE VISIT (OUTPATIENT)
Dept: PRIMARY CARE CLINIC | Facility: CLINIC | Age: 69
End: 2024-02-21

## 2024-02-21 VITALS
WEIGHT: 251.6 LBS | TEMPERATURE: 98 F | OXYGEN SATURATION: 98 % | RESPIRATION RATE: 15 BRPM | DIASTOLIC BLOOD PRESSURE: 80 MMHG | SYSTOLIC BLOOD PRESSURE: 125 MMHG | BODY MASS INDEX: 34.08 KG/M2 | HEIGHT: 72 IN | HEART RATE: 74 BPM

## 2024-02-21 DIAGNOSIS — K21.9 GASTROESOPHAGEAL REFLUX DISEASE WITHOUT ESOPHAGITIS: ICD-10-CM

## 2024-02-21 DIAGNOSIS — I50.22 CHRONIC SYSTOLIC HEART FAILURE (HCC): ICD-10-CM

## 2024-02-21 DIAGNOSIS — R10.84 GENERALIZED ABDOMINAL PAIN: ICD-10-CM

## 2024-02-21 DIAGNOSIS — Z91.81 AT HIGH RISK FOR FALLS: ICD-10-CM

## 2024-02-21 DIAGNOSIS — I10 HYPERTENSION, UNSPECIFIED TYPE: ICD-10-CM

## 2024-02-21 DIAGNOSIS — E78.00 PURE HYPERCHOLESTEROLEMIA: ICD-10-CM

## 2024-02-21 DIAGNOSIS — Z45.02 ICD (IMPLANTABLE CARDIOVERTER-DEFIBRILLATOR) DISCHARGE: ICD-10-CM

## 2024-02-21 DIAGNOSIS — I73.9 PVD (PERIPHERAL VASCULAR DISEASE) (HCC): ICD-10-CM

## 2024-02-21 DIAGNOSIS — I63.9 CEREBROVASCULAR ACCIDENT (CVA), UNSPECIFIED MECHANISM (HCC): ICD-10-CM

## 2024-02-21 DIAGNOSIS — I20.0 UNSTABLE ANGINA PECTORIS (HCC): Primary | ICD-10-CM

## 2024-02-21 DIAGNOSIS — E11.65 TYPE 2 DIABETES MELLITUS WITH HYPERGLYCEMIA, WITHOUT LONG-TERM CURRENT USE OF INSULIN (HCC): ICD-10-CM

## 2024-02-21 RX ORDER — EMPAGLIFLOZIN 10 MG/1
10 TABLET, FILM COATED ORAL DAILY
COMMUNITY
Start: 2024-01-16 | End: 2024-07-14

## 2024-02-21 RX ORDER — ROPINIROLE 0.25 MG/1
0.25 TABLET, FILM COATED ORAL NIGHTLY
COMMUNITY

## 2024-02-21 ASSESSMENT — ENCOUNTER SYMPTOMS
RESPIRATORY NEGATIVE: 1
GASTROINTESTINAL NEGATIVE: 1

## 2024-02-21 ASSESSMENT — PATIENT HEALTH QUESTIONNAIRE - PHQ9
SUM OF ALL RESPONSES TO PHQ QUESTIONS 1-9: 0
1. LITTLE INTEREST OR PLEASURE IN DOING THINGS: 0
SUM OF ALL RESPONSES TO PHQ QUESTIONS 1-9: 0
2. FEELING DOWN, DEPRESSED OR HOPELESS: 0
SUM OF ALL RESPONSES TO PHQ QUESTIONS 1-9: 0
SUM OF ALL RESPONSES TO PHQ9 QUESTIONS 1 & 2: 0
SUM OF ALL RESPONSES TO PHQ QUESTIONS 1-9: 0

## 2024-02-21 NOTE — PROGRESS NOTES
History Narrative   • Not on file     Social Determinants of Health     Financial Resource Strain: Not on file   Food Insecurity: Not on file   Transportation Needs: Not on file   Physical Activity: Not on file   Stress: Not on file   Social Connections: Moderately Isolated (2/22/2022)    Social Connection and Isolation Panel [NHANES]    • Frequency of Communication with Friends and Family: More than three times a week    • Frequency of Social Gatherings with Friends and Family: More than three times a week    • Attends Yarsanism Services: Never    • Active Member of Clubs or Organizations: No    • Attends Club or Organization Meetings: Never    • Marital Status:    Intimate Partner Violence: Not on file   Housing Stability: Not on file       Current Outpatient Medications   Medication Sig Dispense Refill   • JARDIANCE 10 MG tablet Take 1 tablet by mouth daily     • sertraline (ZOLOFT) 50 MG tablet Take 1 tablet by mouth daily     • rOPINIRole (REQUIP) 0.25 MG tablet Take 1 tablet by mouth nightly     • amiodarone (CORDARONE) 200 MG tablet Take 1 tablet by mouth once daily 90 tablet 1   • lisinopril (PRINIVIL;ZESTRIL) 20 MG tablet Take 1 tablet by mouth daily 90 tablet 3   • clopidogrel (PLAVIX) 75 MG tablet Take 1 tablet by mouth daily 90 tablet 3   • rosuvastatin (CRESTOR) 40 MG tablet Take 1 tablet by mouth every evening 90 tablet 3   • carvedilol (COREG) 12.5 MG tablet Take 0.5 tablets by mouth daily 45 tablet 3   • pantoprazole (PROTONIX) 40 MG tablet TAKE 1 TABLET BY MOUTH ONCE DAILY     • aspirin 81 MG EC tablet Take by mouth daily     • diazePAM (VALIUM) 5 MG tablet Take 1 tablet by mouth as needed.     • diphenhydrAMINE (BENADRYL) 25 MG capsule Take 1 capsule by mouth every 6 hours as needed     • fenofibrate (TRIGLIDE) 160 MG tablet TAKE 1 TABLET BY MOUTH ONCE DAILY     • HYDROcodone-acetaminophen (NORCO)  MG per tablet Take 1 tablet by mouth every 4 hours as needed.     • lidocaine (LIDODERM)

## 2024-02-21 NOTE — ED PROVIDER NOTES
Called and left  for patient. Requested for her to test INR tonight or early tomorrow morning. Provided call back number.    Stephen Hernandez, ClaudiaD, BCACP   Patient is a 63 y.o. male presenting with flank pain. The history is provided by the patient. Flank Pain    This is a new problem. The current episode started more than 1 week ago. The problem has been gradually worsening. The problem occurs constantly. Patient reports not work related injury. The pain is associated with twisting (patient reports having to pull up a force approximately 2 weeks ago and straining his right back region. ). The pain is present in the right side. The quality of the pain is described as stabbing, aching and cramping. The pain radiates to the right groin. The pain is severe. The symptoms are aggravated by certain positions. The pain is the same all the time. Associated symptoms include abdominal pain and abdominal swelling. Pertinent negatives include no chest pain, no fever, no numbness, no headaches, no dysuria, no pelvic pain, no paresthesias, no paresis and no tingling. Treatments tried: patient taking Norco/Valium on a chronic basis. The treatment provided mild relief. Risk factors include obesity. The patient's surgical history non-contributory        Past Medical History:   Diagnosis Date    AAA (abdominal aortic aneurysm) ([address] 9/23/2011 9/29/11 AORTIC ABDOMINAL ANUERYSM REPAIR ENDOVASCULAR (EVAR)-  / Yasmine 4.6cm on US 9/23/11     Acute myocardial infarction [facility]) February, 2010    MI 2/10 - Quad bypass 2/25/10 ,4/2016    Anticoagulation monitoring, INR range 2-3 10/5/2011    Anxiety 11/3/2011    Arthritis 11/3/2011    CAD (coronary artery disease)     CAD (coronary artery disease), native coronary artery 2/24/2010    Cardiomyopathy ([address] 2/24/2010    Chest discomfort 06/16/15    Tightness, Pressure.   Jan 2014:  stress MPI with Lexiscan - normal perfusion, LVEF 52%    Chronic systolic heart failure ([address] 2/24/2010    Claudication ([address] 06/16/15    Claudication, symptom, pain relieved by rest    Congestive heart failure, unspecified     COPD (chronic obstructive pulmonary disease) ([facility] 11/3/2011    Extremity atherosclerosis with resting pain [facility] 9/23/2011 9/28/11 ; Ame Ruelas- Dr. Refugio Kayser GERD (gastroesophageal reflux disease) 11/3/2011    History of AAA (abdominal aortic aneurysm) repair ? ??    including right femoral artery    Hypercholesterolemia     Hyperlipidemia 11/3/2011    Hypertension 11/3/2011    Popliteal artery aneurysm, bilateral History 9/23/2011    L Pop A aneurysm stent graft 9/8/11- Dr. Dorothea Palacio Pop A aneurysm repair      Psychiatric disorder     anxiety    PUD (peptic ulcer disease) ? ??    hx bleeding gastric ulcers     PVD (peripheral vascular disease) ([facility] 11/3/2011    S/P AAA (abdominal aortic aneurysm) repair 12/11/2013    EVAR    S/P CABG (coronary artery bypass graft) 4/26/2016    Tobacco abuse 4/23/2014    Weight loss, unintentional 9/23/2011       Past Surgical History:   Procedure Laterality Date    BYPASS GRAFT OTHR,FEM-POP  03/18/2011    right leg    BYPASS GRAFT OTHR,FEM-TIBIAL  2012    LLE bypass pop aneurysm    CABG, ARTERY-VEIN, FIVE      2010    CABG, ARTERY-VEIN, FOUR  2010    HX AAA REPAIR      10/2011    HX CHOLECYSTECTOMY  2012    HX FEMORAL BYPASS      Left bypass stent graft    LAP,CHOLECYSTECTOMY      09/06/11         Family History:   Problem Relation Age of Onset    Heart Disease Mother     Hypertension Mother     Cancer Maternal Grandmother     Heart Disease Maternal Grandfather     Hypertension Maternal Uncle     Diabetes Maternal Uncle     Diabetes Brother     Cancer Paternal Uncle        Social History     Social History    Marital status:      Spouse name: N/A    Number of children: N/A    Years of education: N/A     Occupational History    Not on file.      Social History Main Topics    Smoking status: Current Every Day Smoker     Packs/day: 0.50     Years: 40.00     Types: Cigarettes    Smokeless tobacco: Never Used    Alcohol use No    Drug use: No      Comment: very rare    Sexual activity: Not on file     Other Topics Concern    Not on file     Social History Narrative         ALLERGIES: Venom-honey bee    Review of Systems   Constitutional: Negative for activity change, chills, diaphoresis and fever. HENT: Negative for dental problem, hearing loss, nosebleeds, rhinorrhea and sore throat. Eyes: Negative for pain, discharge, redness and visual disturbance. Respiratory: Negative for cough, chest tightness and shortness of breath. Cardiovascular: Negative for chest pain, palpitations and leg swelling. Gastrointestinal: Positive for abdominal pain. Negative for constipation, diarrhea, nausea and vomiting. Endocrine: Negative for cold intolerance, heat intolerance, polydipsia, polyphagia and polyuria. Genitourinary: Positive for flank pain. Negative for dysuria, hematuria, pelvic pain and testicular pain. Musculoskeletal: Negative for arthralgias, back pain, joint swelling, myalgias and neck pain. Skin: Negative for pallor and rash. Allergic/Immunologic: Negative for environmental allergies and food allergies. Neurological: Negative for dizziness, tingling, tremors, light-headedness, numbness, headaches and paresthesias. Hematological: Negative for adenopathy. Does not bruise/bleed easily. Psychiatric/Behavioral: Negative for confusion and dysphoric mood. The patient is not nervous/anxious and is not hyperactive. All other systems reviewed and are negative. Vitals:    03/29/17 0908   BP: (!) 171/94   Pulse: 85   Resp: 18   Temp: 98.7 °F (37.1 °C)   SpO2: 98%   Weight: 105.2 kg (232 lb)   Height: 6' (1.829 m)            Physical Exam   Constitutional: He is oriented to person, place, and time. He appears well-developed and well-nourished. He appears distressed. HENT:   Head: Normocephalic and atraumatic.    Mouth/Throat: Oropharynx is clear and moist.   Eyes: Conjunctivae and EOM are normal. Pupils are equal, round, and reactive to light. Right eye exhibits no discharge. Left eye exhibits no discharge. No scleral icterus. Neck: Normal range of motion. Neck supple. No JVD present. Cardiovascular: Normal rate, regular rhythm, normal heart sounds and intact distal pulses. Exam reveals no gallop and no friction rub. No murmur heard. Pulmonary/Chest: Effort normal and breath sounds normal. No respiratory distress. He has no wheezes. Abdominal: Soft. Bowel sounds are normal. He exhibits no distension. There is no hepatosplenomegaly. There is no tenderness. There is no rebound and no guarding. Musculoskeletal: Normal range of motion. He exhibits no edema or tenderness. Back:    Lymphadenopathy:     He has no cervical adenopathy. Neurological: He is alert and oriented to person, place, and time. He has normal strength. No cranial nerve deficit or sensory deficit. He exhibits normal muscle tone. GCS eye subscore is 4. GCS verbal subscore is 5. GCS motor subscore is 6. Skin: Skin is warm and dry. No rash noted. He is not diaphoretic. No erythema. Psychiatric: He has a normal mood and affect. His speech is normal and behavior is normal. Judgment and thought content normal. Cognition and memory are normal.   Nursing note and vitals reviewed. MDM  Number of Diagnoses or Management Options  Thoracic myofascial strain, initial encounter: new and requires workup  Diagnosis management comments: Right sided back pain  Muscle strain versus rib fracture  UTI versus renal contusion  Consider intra-abdominal pathology. Workup today reveals a essentially normal CAT scan. Lab work is also unremarkable.        Amount and/or Complexity of Data Reviewed  Clinical lab tests: ordered and reviewed  Tests in the radiology section of CPT®: ordered and reviewed  Tests in the medicine section of CPT®: ordered and reviewed  Review and summarize past medical records: yes  Independent visualization of images, tracings, or specimens: yes    Risk of Complications, Morbidity, and/or Mortality  Presenting problems: high  Diagnostic procedures: moderate  Management options: moderate  General comments: Elements of this note have been dictated via voice recognition software. Text and phrases may be limited by the accuracy of the software. The chart has been reviewed, but errors may still be present.       Patient Progress  Patient progress: improved    ED Course       Procedures

## 2024-02-22 ENCOUNTER — TELEPHONE (OUTPATIENT)
Dept: PRIMARY CARE CLINIC | Facility: CLINIC | Age: 69
End: 2024-02-22

## 2024-02-22 LAB
CHOLEST SERPL-MCNC: 114 MG/DL
EST. AVERAGE GLUCOSE BLD GHB EST-MCNC: 151 MG/DL
HBA1C MFR BLD: 6.9 % (ref 4.8–5.6)
HDLC SERPL-MCNC: 28 MG/DL (ref 40–60)
HDLC SERPL: 4.1
LDLC SERPL CALC-MCNC: 44.4 MG/DL
TRIGL SERPL-MCNC: 208 MG/DL (ref 35–150)
VLDLC SERPL CALC-MCNC: 41.6 MG/DL (ref 6–23)

## 2024-02-22 RX ORDER — CARVEDILOL 12.5 MG/1
TABLET ORAL
Qty: 45 TABLET | Refills: 1 | Status: SHIPPED | OUTPATIENT
Start: 2024-02-22

## 2024-02-22 NOTE — TELEPHONE ENCOUNTER
Requested Prescriptions     Signed Prescriptions Disp Refills    carvedilol (COREG) 12.5 MG tablet 45 tablet 1     Sig: Take 1/2 (one-half) tablet by mouth once daily     Authorizing Provider: PHILIPPE BIRMINGHAM     Ordering User: TRISH VALVERDE       Rx verified.

## 2024-02-22 NOTE — TELEPHONE ENCOUNTER
----- Message from Caitlin Rizvi MD sent at 2/22/2024 11:54 AM EST -----  PLEASE INFORM PATIENT DIABETES IS HIGH WORK ON DIET AND EXERCISE AND REDUCING CARBS

## 2024-04-18 RX ORDER — ROPINIROLE 0.25 MG/1
0.25 TABLET, FILM COATED ORAL NIGHTLY
Qty: 90 TABLET | Refills: 0 | Status: SHIPPED | OUTPATIENT
Start: 2024-04-18

## 2024-04-18 NOTE — TELEPHONE ENCOUNTER
----- Message from Mari Pizarro sent at 4/18/2024 10:38 AM EDT -----  Subject: Refill Request    QUESTIONS  Name of Medication? rOPINIRole (REQUIP) 0.25 MG tablet  Patient-reported dosage and instructions? 1 tablet at night  How many days do you have left? 0  Preferred Pharmacy? [id number]  Pharmacy phone number (if available)? [phone]  ---------------------------------------------------------------------------  --------------  CALL BACK INFO  What is the best way for the office to contact you? OK to leave message on   voicemail  Preferred Call Back Phone Number? [phone]  ---------------------------------------------------------------------------  --------------  SCRIPT ANSWERS  Relationship to Patient? Self

## 2024-05-13 ENCOUNTER — NURSE ONLY (OUTPATIENT)
Dept: PRIMARY CARE CLINIC | Facility: CLINIC | Age: 69
End: 2024-05-13

## 2024-05-13 DIAGNOSIS — I10 HYPERTENSION, UNSPECIFIED TYPE: ICD-10-CM

## 2024-05-13 DIAGNOSIS — E78.00 PURE HYPERCHOLESTEROLEMIA: ICD-10-CM

## 2024-05-13 DIAGNOSIS — Z45.02 ICD (IMPLANTABLE CARDIOVERTER-DEFIBRILLATOR) DISCHARGE: ICD-10-CM

## 2024-05-13 DIAGNOSIS — E11.65 TYPE 2 DIABETES MELLITUS WITH HYPERGLYCEMIA, WITHOUT LONG-TERM CURRENT USE OF INSULIN (HCC): ICD-10-CM

## 2024-05-13 LAB
ALBUMIN SERPL-MCNC: 3.7 G/DL (ref 3.2–4.6)
ALBUMIN/GLOB SERPL: 1.3 (ref 1–1.9)
ALP SERPL-CCNC: 83 U/L (ref 40–129)
ALT SERPL-CCNC: 19 U/L (ref 12–65)
ANION GAP SERPL CALC-SCNC: 10 MMOL/L (ref 9–18)
AST SERPL-CCNC: 22 U/L (ref 15–37)
BASOPHILS # BLD: 0.1 K/UL (ref 0–0.2)
BASOPHILS NFR BLD: 1 % (ref 0–2)
BILIRUB SERPL-MCNC: 0.2 MG/DL (ref 0–1.2)
BUN SERPL-MCNC: 16 MG/DL (ref 8–23)
CALCIUM SERPL-MCNC: 9.8 MG/DL (ref 8.8–10.2)
CHLORIDE SERPL-SCNC: 105 MMOL/L (ref 98–107)
CHOLEST SERPL-MCNC: 275 MG/DL (ref 0–200)
CO2 SERPL-SCNC: 20 MMOL/L (ref 20–28)
CREAT SERPL-MCNC: 1.1 MG/DL (ref 0.8–1.3)
DIFFERENTIAL METHOD BLD: ABNORMAL
EOSINOPHIL # BLD: 0.3 K/UL (ref 0–0.8)
EOSINOPHIL NFR BLD: 4 % (ref 0.5–7.8)
ERYTHROCYTE [DISTWIDTH] IN BLOOD BY AUTOMATED COUNT: 13.8 % (ref 11.9–14.6)
EST. AVERAGE GLUCOSE BLD GHB EST-MCNC: 172 MG/DL
GLOBULIN SER CALC-MCNC: 2.8 G/DL (ref 2.3–3.5)
GLUCOSE SERPL-MCNC: 176 MG/DL (ref 70–99)
HBA1C MFR BLD: 7.6 % (ref 0–5.6)
HCT VFR BLD AUTO: 52.3 % (ref 41.1–50.3)
HDLC SERPL-MCNC: 26 MG/DL (ref 40–60)
HDLC SERPL: 10.5 (ref 0–5)
HGB BLD-MCNC: 16.3 G/DL (ref 13.6–17.2)
IMM GRANULOCYTES # BLD AUTO: 0.1 K/UL (ref 0–0.5)
IMM GRANULOCYTES NFR BLD AUTO: 1 % (ref 0–5)
LDLC SERPL CALC-MCNC: 164 MG/DL (ref 0–100)
LDLC SERPL DIRECT ASSAY-MCNC: 163 MG/DL (ref 0–100)
LYMPHOCYTES # BLD: 2.3 K/UL (ref 0.5–4.6)
LYMPHOCYTES NFR BLD: 24 % (ref 13–44)
MCH RBC QN AUTO: 28.3 PG (ref 26.1–32.9)
MCHC RBC AUTO-ENTMCNC: 31.2 G/DL (ref 31.4–35)
MCV RBC AUTO: 91 FL (ref 82–102)
MONOCYTES # BLD: 0.9 K/UL (ref 0.1–1.3)
MONOCYTES NFR BLD: 9 % (ref 4–12)
NEUTS SEG # BLD: 5.7 K/UL (ref 1.7–8.2)
NEUTS SEG NFR BLD: 61 % (ref 43–78)
NRBC # BLD: 0 K/UL (ref 0–0.2)
PLATELET # BLD AUTO: 237 K/UL (ref 150–450)
PMV BLD AUTO: 9.7 FL (ref 9.4–12.3)
POTASSIUM SERPL-SCNC: 4.4 MMOL/L (ref 3.5–5.1)
PROT SERPL-MCNC: 6.5 G/DL (ref 6.3–8.2)
RBC # BLD AUTO: 5.75 M/UL (ref 4.23–5.6)
SODIUM SERPL-SCNC: 136 MMOL/L (ref 136–145)
TRIGL SERPL-MCNC: 423 MG/DL (ref 0–150)
VLDLC SERPL CALC-MCNC: 85 MG/DL (ref 6–23)
WBC # BLD AUTO: 9.4 K/UL (ref 4.3–11.1)

## 2024-05-14 ENCOUNTER — TELEPHONE (OUTPATIENT)
Dept: PRIMARY CARE CLINIC | Facility: CLINIC | Age: 69
End: 2024-05-14

## 2024-05-14 NOTE — TELEPHONE ENCOUNTER
Spoke with the patient and let him know, that his labs are in. And the doctor will go over them with him at his next apt on 5/20.

## 2024-05-16 ENCOUNTER — TELEPHONE (OUTPATIENT)
Age: 69
End: 2024-05-16

## 2024-05-16 ENCOUNTER — OFFICE VISIT (OUTPATIENT)
Age: 69
End: 2024-05-16
Payer: MEDICARE

## 2024-05-16 VITALS
HEART RATE: 59 BPM | WEIGHT: 249 LBS | SYSTOLIC BLOOD PRESSURE: 120 MMHG | DIASTOLIC BLOOD PRESSURE: 70 MMHG | BODY MASS INDEX: 33.77 KG/M2

## 2024-05-16 DIAGNOSIS — E78.00 PURE HYPERCHOLESTEROLEMIA: ICD-10-CM

## 2024-05-16 DIAGNOSIS — Z45.02 ICD (IMPLANTABLE CARDIOVERTER-DEFIBRILLATOR) DISCHARGE: ICD-10-CM

## 2024-05-16 DIAGNOSIS — I10 PRIMARY HYPERTENSION: ICD-10-CM

## 2024-05-16 DIAGNOSIS — Z95.1 S/P CABG (CORONARY ARTERY BYPASS GRAFT): ICD-10-CM

## 2024-05-16 DIAGNOSIS — Z98.890 S/P AAA (ABDOMINAL AORTIC ANEURYSM) REPAIR: ICD-10-CM

## 2024-05-16 DIAGNOSIS — Z86.79 S/P AAA (ABDOMINAL AORTIC ANEURYSM) REPAIR: ICD-10-CM

## 2024-05-16 DIAGNOSIS — I25.10 CORONARY ARTERY DISEASE INVOLVING NATIVE CORONARY ARTERY OF NATIVE HEART WITHOUT ANGINA PECTORIS: Primary | ICD-10-CM

## 2024-05-16 PROCEDURE — 3017F COLORECTAL CA SCREEN DOC REV: CPT | Performed by: INTERNAL MEDICINE

## 2024-05-16 PROCEDURE — 1036F TOBACCO NON-USER: CPT | Performed by: INTERNAL MEDICINE

## 2024-05-16 PROCEDURE — 3078F DIAST BP <80 MM HG: CPT | Performed by: INTERNAL MEDICINE

## 2024-05-16 PROCEDURE — 3074F SYST BP LT 130 MM HG: CPT | Performed by: INTERNAL MEDICINE

## 2024-05-16 PROCEDURE — G8428 CUR MEDS NOT DOCUMENT: HCPCS | Performed by: INTERNAL MEDICINE

## 2024-05-16 PROCEDURE — 93000 ELECTROCARDIOGRAM COMPLETE: CPT | Performed by: INTERNAL MEDICINE

## 2024-05-16 PROCEDURE — 99214 OFFICE O/P EST MOD 30 MIN: CPT | Performed by: INTERNAL MEDICINE

## 2024-05-16 PROCEDURE — G8417 CALC BMI ABV UP PARAM F/U: HCPCS | Performed by: INTERNAL MEDICINE

## 2024-05-16 PROCEDURE — 1123F ACP DISCUSS/DSCN MKR DOCD: CPT | Performed by: INTERNAL MEDICINE

## 2024-05-16 NOTE — PROGRESS NOTES
[facility] CARDIOLOGY  [address] [place] [address], [address]  [place] [address]  PHONE: [phone]      24    NAME:  Mihai Ledesma  : 1955  MRN: [id number]       SUBJECTIVE:   Mihai Ledesma is a 68 y.o. male seen for a follow up visit regarding the following:     Chief Complaint   Patient presents with    Coronary Artery Disease         HPI:    No cp or fernandez. No orthopnea or pnd. No palpitations or syncope.      Past Medical History, Past Surgical History, Family history, Social History, and Medications were all reviewed with the patient today and updated as necessary.     Current Outpatient Medications   Medication Sig Dispense Refill    rOPINIRole (REQUIP) 0.25 MG tablet Take 1 tablet by mouth nightly 90 tablet 0    carvedilol (COREG) 12.5 MG tablet Take 1/2 (one-half) tablet by mouth once daily 45 tablet 1    JARDIANCE 10 MG tablet Take 1 tablet by mouth daily      sertraline (ZOLOFT) 50 MG tablet Take 1 tablet by mouth daily      amiodarone (CORDARONE) 200 MG tablet Take 1 tablet by mouth once daily 90 tablet 1    lisinopril (PRINIVIL;ZESTRIL) 20 MG tablet Take 1 tablet by mouth daily 90 tablet 3    clopidogrel (PLAVIX) 75 MG tablet Take 1 tablet by mouth daily 90 tablet 3    rosuvastatin (CRESTOR) 40 MG tablet Take 1 tablet by mouth every evening 90 tablet 3    pantoprazole (PROTONIX) 40 MG tablet TAKE 1 TABLET BY MOUTH ONCE DAILY      aspirin 81 MG EC tablet Take by mouth daily      diazePAM (VALIUM) 5 MG tablet Take 1 tablet by mouth as needed.      diphenhydrAMINE (BENADRYL) 25 MG capsule Take 1 capsule by mouth every 6 hours as needed      fenofibrate (TRIGLIDE) 160 MG tablet TAKE 1 TABLET BY MOUTH ONCE DAILY      HYDROcodone-acetaminophen (NORCO)  MG per tablet Take 1 tablet by mouth every 4 hours as needed.       No current facility-administered medications for this visit.               Social History     Tobacco Use    Smoking status: Former     Current packs/day: 0.00

## 2024-05-20 ENCOUNTER — OFFICE VISIT (OUTPATIENT)
Dept: PRIMARY CARE CLINIC | Facility: CLINIC | Age: 69
End: 2024-05-20
Payer: MEDICARE

## 2024-05-20 VITALS
WEIGHT: 247 LBS | HEIGHT: 72 IN | OXYGEN SATURATION: 94 % | HEART RATE: 52 BPM | BODY MASS INDEX: 33.46 KG/M2 | SYSTOLIC BLOOD PRESSURE: 120 MMHG | DIASTOLIC BLOOD PRESSURE: 72 MMHG

## 2024-05-20 DIAGNOSIS — Z45.02 ICD (IMPLANTABLE CARDIOVERTER-DEFIBRILLATOR) DISCHARGE: Primary | ICD-10-CM

## 2024-05-20 DIAGNOSIS — E78.00 PURE HYPERCHOLESTEROLEMIA: ICD-10-CM

## 2024-05-20 DIAGNOSIS — I10 HYPERTENSION, UNSPECIFIED TYPE: ICD-10-CM

## 2024-05-20 DIAGNOSIS — E11.65 TYPE 2 DIABETES MELLITUS WITH HYPERGLYCEMIA, WITHOUT LONG-TERM CURRENT USE OF INSULIN (HCC): ICD-10-CM

## 2024-05-20 PROCEDURE — G8417 CALC BMI ABV UP PARAM F/U: HCPCS | Performed by: INTERNAL MEDICINE

## 2024-05-20 PROCEDURE — G8427 DOCREV CUR MEDS BY ELIG CLIN: HCPCS | Performed by: INTERNAL MEDICINE

## 2024-05-20 PROCEDURE — 3051F HG A1C>EQUAL 7.0%<8.0%: CPT | Performed by: INTERNAL MEDICINE

## 2024-05-20 PROCEDURE — 3017F COLORECTAL CA SCREEN DOC REV: CPT | Performed by: INTERNAL MEDICINE

## 2024-05-20 PROCEDURE — 2022F DILAT RTA XM EVC RTNOPTHY: CPT | Performed by: INTERNAL MEDICINE

## 2024-05-20 PROCEDURE — 3078F DIAST BP <80 MM HG: CPT | Performed by: INTERNAL MEDICINE

## 2024-05-20 PROCEDURE — 1123F ACP DISCUSS/DSCN MKR DOCD: CPT | Performed by: INTERNAL MEDICINE

## 2024-05-20 PROCEDURE — 3074F SYST BP LT 130 MM HG: CPT | Performed by: INTERNAL MEDICINE

## 2024-05-20 PROCEDURE — 1036F TOBACCO NON-USER: CPT | Performed by: INTERNAL MEDICINE

## 2024-05-20 PROCEDURE — 99214 OFFICE O/P EST MOD 30 MIN: CPT | Performed by: INTERNAL MEDICINE

## 2024-05-20 RX ORDER — EZETIMIBE 10 MG/1
10 TABLET ORAL DAILY
Qty: 90 TABLET | Refills: 1 | Status: SHIPPED | OUTPATIENT
Start: 2024-05-20

## 2024-05-20 SDOH — ECONOMIC STABILITY: INCOME INSECURITY: HOW HARD IS IT FOR YOU TO PAY FOR THE VERY BASICS LIKE FOOD, HOUSING, MEDICAL CARE, AND HEATING?: NOT VERY HARD

## 2024-05-20 SDOH — ECONOMIC STABILITY: HOUSING INSECURITY
IN THE LAST 12 MONTHS, WAS THERE A TIME WHEN YOU DID NOT HAVE A STEADY PLACE TO SLEEP OR SLEPT IN A SHELTER (INCLUDING NOW)?: NO

## 2024-05-20 SDOH — ECONOMIC STABILITY: FOOD INSECURITY: WITHIN THE PAST 12 MONTHS, THE FOOD YOU BOUGHT JUST DIDN'T LAST AND YOU DIDN'T HAVE MONEY TO GET MORE.: NEVER TRUE

## 2024-05-20 SDOH — ECONOMIC STABILITY: FOOD INSECURITY: WITHIN THE PAST 12 MONTHS, YOU WORRIED THAT YOUR FOOD WOULD RUN OUT BEFORE YOU GOT MONEY TO BUY MORE.: NEVER TRUE

## 2024-05-20 ASSESSMENT — ENCOUNTER SYMPTOMS
GASTROINTESTINAL NEGATIVE: 1
RESPIRATORY NEGATIVE: 1

## 2024-05-20 NOTE — PROGRESS NOTES
by mouth daily 90 tablet 3    rosuvastatin (CRESTOR) 40 MG tablet Take 1 tablet by mouth every evening 90 tablet 3    pantoprazole (PROTONIX) 40 MG tablet TAKE 1 TABLET BY MOUTH ONCE DAILY      aspirin 81 MG EC tablet Take by mouth daily      diazePAM (VALIUM) 5 MG tablet Take 1 tablet by mouth as needed.      diphenhydrAMINE (BENADRYL) 25 MG capsule Take 1 capsule by mouth every 6 hours as needed      fenofibrate (TRIGLIDE) 160 MG tablet TAKE 1 TABLET BY MOUTH ONCE DAILY      HYDROcodone-acetaminophen (NORCO)  MG per tablet Take 1 tablet by mouth every 4 hours as needed.       No current facility-administered medications for this visit.       Allergies as of 05/20/2024 - Fully Reviewed 05/20/2024   Allergen Reaction Noted    Bee venom Angioedema 09/06/2011    Codeine Rash 11/11/2020       Review of Systems   Constitutional: Negative.    Respiratory: Negative.     Cardiovascular: Negative.    Gastrointestinal: Negative.    Musculoskeletal: Negative.        Objective:    Blood pressure 120/72, pulse 52, height 1.829 m (6'), weight 112 kg (247 lb), SpO2 94 %.    Physical Exam  Vitals and nursing note reviewed.   Constitutional:       Appearance: Normal appearance.   Cardiovascular:      Rate and Rhythm: Normal rate and regular rhythm.      Pulses: Normal pulses.      Heart sounds: Normal heart sounds.   Pulmonary:      Effort: Pulmonary effort is normal.      Breath sounds: Normal breath sounds.   Neurological:      Mental Status: He is alert.       No results found for this visit on 05/20/24.    Assessent & Plan  68 year old not taking repatha cholesterol worse- sugars worse eating more carbs - will add zetia and check in 3 months   1. ICD (implantable cardioverter-defibrillator) discharge  2. Type 2 diabetes mellitus with hyperglycemia, without long-term current use of insulin (HCC)  -     Hemoglobin A1C; Future  3. Pure hypercholesterolemia  -     ezetimibe (ZETIA) 10 MG tablet; Take 1 tablet by mouth daily,

## 2024-05-21 RX ORDER — AMIODARONE HYDROCHLORIDE 200 MG/1
TABLET ORAL
Qty: 90 TABLET | Refills: 1 | Status: SHIPPED | OUTPATIENT
Start: 2024-05-21

## 2024-06-07 ENCOUNTER — TELEPHONE (OUTPATIENT)
Age: 69
End: 2024-06-07

## 2024-06-07 NOTE — TELEPHONE ENCOUNTER
Called patient to update him on the unblinding information for the AEGIS II study.  Left message for patient to call me back at [phone].

## 2024-06-11 ENCOUNTER — TELEPHONE (OUTPATIENT)
Age: 69
End: 2024-06-11

## 2024-06-11 NOTE — TELEPHONE ENCOUNTER
Patient returned my call regarding the AEGIS II Study.  Patient informed that he was randomized to the [id number] drug.  He denied any questions or concerns today but he was encouraged to call if he has any questions/concerns.  He expressed understanding.

## 2024-06-14 ENCOUNTER — OFFICE VISIT (OUTPATIENT)
Dept: VASCULAR SURGERY | Age: 69
End: 2024-06-14
Payer: MEDICARE

## 2024-06-14 ENCOUNTER — TELEPHONE (OUTPATIENT)
Dept: PRIMARY CARE CLINIC | Facility: CLINIC | Age: 69
End: 2024-06-14

## 2024-06-14 VITALS
OXYGEN SATURATION: 94 % | HEIGHT: 72 IN | SYSTOLIC BLOOD PRESSURE: 160 MMHG | WEIGHT: 248 LBS | HEART RATE: 56 BPM | DIASTOLIC BLOOD PRESSURE: 93 MMHG | BODY MASS INDEX: 33.59 KG/M2

## 2024-06-14 DIAGNOSIS — I72.4 ANEURYSM OF POPLITEAL ARTERY (HCC): ICD-10-CM

## 2024-06-14 DIAGNOSIS — I71.43 INFRARENAL ABDOMINAL AORTIC ANEURYSM (AAA) WITHOUT RUPTURE (HCC): Primary | ICD-10-CM

## 2024-06-14 DIAGNOSIS — Z98.890 H/O ABDOMINAL AORTIC ANEURYSM REPAIR: ICD-10-CM

## 2024-06-14 DIAGNOSIS — I73.9 PAD (PERIPHERAL ARTERY DISEASE) (HCC): ICD-10-CM

## 2024-06-14 PROCEDURE — 3077F SYST BP >= 140 MM HG: CPT | Performed by: NURSE PRACTITIONER

## 2024-06-14 PROCEDURE — 1036F TOBACCO NON-USER: CPT | Performed by: NURSE PRACTITIONER

## 2024-06-14 PROCEDURE — 1123F ACP DISCUSS/DSCN MKR DOCD: CPT | Performed by: NURSE PRACTITIONER

## 2024-06-14 PROCEDURE — 99213 OFFICE O/P EST LOW 20 MIN: CPT | Performed by: NURSE PRACTITIONER

## 2024-06-14 PROCEDURE — G8427 DOCREV CUR MEDS BY ELIG CLIN: HCPCS | Performed by: NURSE PRACTITIONER

## 2024-06-14 PROCEDURE — G8417 CALC BMI ABV UP PARAM F/U: HCPCS | Performed by: NURSE PRACTITIONER

## 2024-06-14 PROCEDURE — 3079F DIAST BP 80-89 MM HG: CPT | Performed by: NURSE PRACTITIONER

## 2024-06-14 PROCEDURE — 3017F COLORECTAL CA SCREEN DOC REV: CPT | Performed by: NURSE PRACTITIONER

## 2024-06-14 NOTE — PROGRESS NOTES
DATE OF VISIT: 6/14/2024      [facility]  Mihai Ledesma is a 68 y.o. male who follows up for his arterial duplex study. He c/o back pain. He does see pain management. Reports that his cholesterol medication was changed recently. He is taking Plavix, Crestor, ASA, Zetia and fenofibrate. He has undergone aneurysm repair and bilateral fem-pop bypass grafts which have occluded. He is a previous smoker. He denies claudication.         MEDICAL HISTORY:   Past Medical History:   Diagnosis Date    AAA (abdominal aortic aneurysm) ([facility] 9/23/2011 9/29/11 AORTIC ABDOMINAL ANUERYSM REPAIR ENDOVASCULAR (EVAR)-  Monique Bui 4.6cm on US 9/23/11     Acute myocardial infarction ([facility]) February, 2010    MI 2/10 - Quad bypass 2/25/10 ,4/2016    Anticoagulation monitoring, INR range 2-3 10/5/2011    Anxiety 11/3/2011    Arthritis 11/3/2011    CAD (coronary artery disease)     CAD (coronary artery disease), native coronary artery 2/24/2010    Cardiomyopathy ([facility]) 2/24/2010    Chest discomfort 06/16/15    Tightness, Pressure.  Jan 2014:  stress MPI with Lexiscan - normal perfusion, LVEF 52%    Chronic systolic heart failure ([facility]) 2/24/2010    Claudication ([facility]) 06/16/15    Claudication, symptom, pain relieved by rest    Congestive heart failure, unspecified     COPD (chronic obstructive pulmonary disease) ([facility]) 11/3/2011    Extremity atherosclerosis with resting pain ([facility]) 9/23/2011 9/28/11 ; THROMBECTOMY/EMBOLECTOMY LOWER EXTREMITY - LEFT POPLITEAL ARTERY EMBOLECTOMY- Dr. Bui     GERD (gastroesophageal reflux disease) 11/3/2011    History of AAA (abdominal aortic aneurysm) repair ???    including right femoral artery    Hypercholesterolemia     Hyperlipidemia 11/3/2011    Hypertension 11/3/2011    Popliteal artery aneurysm, bilateral ([facility]) 9/23/2011    L Pop A aneurysm stent graft 9/8/11- Dr. Bui R Pop A aneurysm repair      Psychiatric disorder     anxiety    PUD (peptic ulcer disease) ???    hx bleeding gastric  Adult Outpatient Psychiatry Department  [facility]  YVON Navarro/ Dr. Lemus

## 2024-06-14 NOTE — TELEPHONE ENCOUNTER
Patient called requesting prescription refill for 2 medications.patient was seen o 05/21/24 and ha a follow up on August .please call patient back as  soon is ready,thanks.

## 2024-06-14 NOTE — TELEPHONE ENCOUNTER
MEDICATION REFILL REQUEST      Name of Medication:  PANTOPRAZOLE  Dose:  40MG  Frequency:  QD  Quantity:  90  Days' supply:  90 WITH 3 REFILLS      Pharmacy Name/Location:  [place] [address]    MEDICATION REFILL REQUEST      Name of Medication:  lisinopril  Dose:  20 mg  Frequency:  QD  Quantity:  90  Days' supply:  90 with 3 refills      Pharmacy Name/Location:  [place] [address]

## 2024-06-14 NOTE — TELEPHONE ENCOUNTER
Pt notified PCP not prescriber of requested medications and to reach out to cardiology with Pt verbalizing understanding.

## 2024-06-17 RX ORDER — PANTOPRAZOLE SODIUM 40 MG/1
40 TABLET, DELAYED RELEASE ORAL DAILY
Qty: 90 TABLET | Refills: 3 | Status: SHIPPED | OUTPATIENT
Start: 2024-06-17

## 2024-06-17 RX ORDER — LISINOPRIL 20 MG/1
20 TABLET ORAL DAILY
Qty: 90 TABLET | Refills: 3 | Status: SHIPPED | OUTPATIENT
Start: 2024-06-17

## 2024-06-17 NOTE — TELEPHONE ENCOUNTER
Requested Prescriptions     Pending Prescriptions Disp Refills    lisinopril (PRINIVIL;ZESTRIL) 20 MG tablet 90 tablet 3     Sig: Take 1 tablet by mouth daily    pantoprazole (PROTONIX) 40 MG tablet 90 tablet 3     Sig: Take 1 tablet by mouth daily    Verified rx. Last OV 5/16/2024. Erx to pharm on file.

## 2024-06-26 RX ORDER — NALOXONE HYDROCHLORIDE 4 MG/.1ML
SPRAY NASAL
COMMUNITY
Start: 2024-05-29

## 2024-06-27 ENCOUNTER — OFFICE VISIT (OUTPATIENT)
Dept: PRIMARY CARE CLINIC | Facility: CLINIC | Age: 69
End: 2024-06-27
Payer: MEDICARE

## 2024-06-27 VITALS
SYSTOLIC BLOOD PRESSURE: 138 MMHG | OXYGEN SATURATION: 98 % | DIASTOLIC BLOOD PRESSURE: 76 MMHG | WEIGHT: 249.36 LBS | HEART RATE: 78 BPM | HEIGHT: 72 IN | BODY MASS INDEX: 33.78 KG/M2

## 2024-06-27 DIAGNOSIS — M54.9 BACK PAIN, UNSPECIFIED BACK LOCATION, UNSPECIFIED BACK PAIN LATERALITY, UNSPECIFIED CHRONICITY: ICD-10-CM

## 2024-06-27 DIAGNOSIS — R10.9 FLANK PAIN: Primary | ICD-10-CM

## 2024-06-27 LAB
ANION GAP SERPL CALC-SCNC: 11 MMOL/L (ref 9–18)
APPEARANCE UR: CLEAR
BACTERIA URNS QL MICRO: NEGATIVE /HPF
BASOPHILS # BLD: 0.1 K/UL (ref 0–0.2)
BASOPHILS NFR BLD: 1 % (ref 0–2)
BILIRUB UR QL: NEGATIVE
BUN SERPL-MCNC: 21 MG/DL (ref 8–23)
CALCIUM SERPL-MCNC: 9.5 MG/DL (ref 8.8–10.2)
CHLORIDE SERPL-SCNC: 106 MMOL/L (ref 98–107)
CO2 SERPL-SCNC: 23 MMOL/L (ref 20–28)
COLOR UR: NORMAL
CREAT SERPL-MCNC: 1.2 MG/DL (ref 0.8–1.3)
DIFFERENTIAL METHOD BLD: ABNORMAL
EOSINOPHIL # BLD: 0.4 K/UL (ref 0–0.8)
EOSINOPHIL NFR BLD: 4 % (ref 0.5–7.8)
EPI CELLS #/AREA URNS HPF: NORMAL /HPF (ref 0–5)
ERYTHROCYTE [DISTWIDTH] IN BLOOD BY AUTOMATED COUNT: 13.5 % (ref 11.9–14.6)
GLUCOSE SERPL-MCNC: 156 MG/DL (ref 70–99)
GLUCOSE UR STRIP.AUTO-MCNC: NEGATIVE MG/DL
HCT VFR BLD AUTO: 50.8 % (ref 41.1–50.3)
HGB BLD-MCNC: 16.1 G/DL (ref 13.6–17.2)
HGB UR QL STRIP: NEGATIVE
HYALINE CASTS URNS QL MICRO: NORMAL /LPF
IMM GRANULOCYTES # BLD AUTO: 0.1 K/UL (ref 0–0.5)
IMM GRANULOCYTES NFR BLD AUTO: 1 % (ref 0–5)
KETONES UR QL STRIP.AUTO: NEGATIVE MG/DL
LEUKOCYTE ESTERASE UR QL STRIP.AUTO: NEGATIVE
LYMPHOCYTES # BLD: 2.6 K/UL (ref 0.5–4.6)
LYMPHOCYTES NFR BLD: 25 % (ref 13–44)
MCH RBC QN AUTO: 28.2 PG (ref 26.1–32.9)
MCHC RBC AUTO-ENTMCNC: 31.7 G/DL (ref 31.4–35)
MCV RBC AUTO: 89 FL (ref 82–102)
MONOCYTES # BLD: 0.8 K/UL (ref 0.1–1.3)
MONOCYTES NFR BLD: 8 % (ref 4–12)
NEUTS SEG # BLD: 6.3 K/UL (ref 1.7–8.2)
NEUTS SEG NFR BLD: 61 % (ref 43–78)
NITRITE UR QL STRIP.AUTO: NEGATIVE
NRBC # BLD: 0 K/UL (ref 0–0.2)
PH UR STRIP: 5.5 (ref 5–9)
PLATELET # BLD AUTO: 245 K/UL (ref 150–450)
PMV BLD AUTO: 9.8 FL (ref 9.4–12.3)
POTASSIUM SERPL-SCNC: 4.5 MMOL/L (ref 3.5–5.1)
PROT UR STRIP-MCNC: NEGATIVE MG/DL
RBC # BLD AUTO: 5.71 M/UL (ref 4.23–5.6)
RBC #/AREA URNS HPF: NORMAL /HPF (ref 0–5)
SODIUM SERPL-SCNC: 140 MMOL/L (ref 136–145)
SP GR UR REFRACTOMETRY: 1.02 (ref 1–1.02)
URINE CULTURE IF INDICATED: NORMAL
UROBILINOGEN UR QL STRIP.AUTO: 0.2 EU/DL (ref 0.2–1)
WBC # BLD AUTO: 10.2 K/UL (ref 4.3–11.1)
WBC URNS QL MICRO: NORMAL /HPF (ref 0–4)

## 2024-06-27 PROCEDURE — 1036F TOBACCO NON-USER: CPT | Performed by: INTERNAL MEDICINE

## 2024-06-27 PROCEDURE — G8417 CALC BMI ABV UP PARAM F/U: HCPCS | Performed by: INTERNAL MEDICINE

## 2024-06-27 PROCEDURE — 99214 OFFICE O/P EST MOD 30 MIN: CPT | Performed by: INTERNAL MEDICINE

## 2024-06-27 PROCEDURE — G8427 DOCREV CUR MEDS BY ELIG CLIN: HCPCS | Performed by: INTERNAL MEDICINE

## 2024-06-27 PROCEDURE — 3075F SYST BP GE 130 - 139MM HG: CPT | Performed by: INTERNAL MEDICINE

## 2024-06-27 PROCEDURE — 1123F ACP DISCUSS/DSCN MKR DOCD: CPT | Performed by: INTERNAL MEDICINE

## 2024-06-27 PROCEDURE — 3017F COLORECTAL CA SCREEN DOC REV: CPT | Performed by: INTERNAL MEDICINE

## 2024-06-27 PROCEDURE — 3078F DIAST BP <80 MM HG: CPT | Performed by: INTERNAL MEDICINE

## 2024-06-27 ASSESSMENT — ENCOUNTER SYMPTOMS
RESPIRATORY NEGATIVE: 1
GASTROINTESTINAL NEGATIVE: 1

## 2024-06-27 NOTE — PROGRESS NOTES
FOLLOW UP VISIT    Subjective:    Mihai Ledesma (: 1955) is a 68 y.o., male,   Chief Complaint   Patient presents with    Flank Pain     Intermittent since  - right side when taking deep breaths - Pt denies tenderness/known injury/trauma  Pt reports pain started after Reptha - stopped injection in         HPI:  Flank Pain      Very nice  patient in to follow up.   He has flank pain since November.  He has not had US or labs .  He has a hx of kidney stones  AAA has a Vascuylar MD  Acute MI and CAD and CHF has Cardiology   CAD on plavix  CHF on Ace and Coreg  High cholesterol on crestor not taking repatha  High blood pressure on meds  COPD  DM II nA1C  7.5   COPD  HCM COLO  ?     The following portions of the patient's history were reviewed and updated as appropriate:      Past Medical History:   Diagnosis Date    AAA (abdominal aortic aneurysm) ([facility]) 2011 AORTIC ABDOMINAL ANUERYSM REPAIR ENDOVASCULAR (EVAR)-  Monique Bui 4.6cm on US 11     Acute myocardial infarction (HCC)     MI 2/10 - Quad bypass 2/25/10 ,2016    Anticoagulation monitoring, INR range 2-3 10/5/2011    Anxiety 11/3/2011    Arthritis 11/3/2011    CAD (coronary artery disease)     CAD (coronary artery disease), native coronary artery 2010    Cardiomyopathy ([facility]) 2010    Chest discomfort 06/16/15    Tightness, Pressure.  2014:  stress MPI with Lexiscan - normal perfusion, LVEF 52%    Chronic systolic heart failure ([facility]) 2010    Claudication ([facility]) 06/16/15    Claudication, symptom, pain relieved by rest    Congestive heart failure, unspecified     COPD (chronic obstructive pulmonary disease) ([facility]) 11/3/2011    Extremity atherosclerosis with resting pain ([facility]) 2011 ; THROMBECTOMY/EMBOLECTOMY LOWER EXTREMITY - LEFT POPLITEAL ARTERY EMBOLECTOMY- Dr. Bui     GERD (gastroesophageal reflux disease) 11/3/2011    History of AAA (abdominal aortic aneurysm)

## 2024-06-28 ENCOUNTER — TELEPHONE (OUTPATIENT)
Dept: PRIMARY CARE CLINIC | Facility: CLINIC | Age: 69
End: 2024-06-28

## 2024-06-28 DIAGNOSIS — K76.0 FATTY LIVER: Primary | ICD-10-CM

## 2024-06-28 NOTE — TELEPHONE ENCOUNTER
----- Message from Caitlin Rizvi MD sent at 6/28/2024 10:52 AM EDT -----  Is patient seeing GI for fatty liver ?

## 2024-07-08 NOTE — TELEPHONE ENCOUNTER
Medication refill request  Medication: Sertraline  Last OV/VV: 6/27/24  Pharmacy: [facility] [place]

## 2024-07-11 ENCOUNTER — HOSPITAL ENCOUNTER (OUTPATIENT)
Dept: PHYSICAL THERAPY | Age: 69
Setting detail: RECURRING SERIES
Discharge: HOME | End: 2024-07-14
Payer: MEDICARE

## 2024-07-11 DIAGNOSIS — R54 AGE-RELATED PHYSICAL DEBILITY: Primary | ICD-10-CM

## 2024-07-11 DIAGNOSIS — G89.4 CHRONIC PAIN SYNDROME: ICD-10-CM

## 2024-07-11 PROCEDURE — 97162 PT EVAL MOD COMPLEX 30 MIN: CPT

## 2024-07-11 ASSESSMENT — PAIN DESCRIPTION - PAIN TYPE: TYPE: CHRONIC PAIN

## 2024-07-11 ASSESSMENT — PAIN DESCRIPTION - LOCATION: LOCATION: BACK

## 2024-07-11 ASSESSMENT — PAIN SCALES - GENERAL: PAINLEVEL_OUTOF10: 5

## 2024-07-11 ASSESSMENT — PAIN DESCRIPTION - ORIENTATION: ORIENTATION: POSTERIOR;RIGHT

## 2024-07-11 ASSESSMENT — PAIN DESCRIPTION - DESCRIPTORS: DESCRIPTORS: ACHING

## 2024-07-11 NOTE — THERAPY EVALUATION
Date: 09/11/24     Frequency/Duration:      Interventions Planned (Treatment may consist of any combination of the following):    Balance Training, Endurance Training, Functional Mobility Training, Home Exercise Program (HEP), Therapeutic Activites, and Therapeutic Exercise/Strengthening   Goals: (Goals have been discussed and agreed upon with patient.)  GOALS: (Goals have been discussed and agreed upon with patient.)   Short-Term Goals: 4 weeks  Goal Met   1. Mihai Ledesma will be independent with HEP to maintain functional gains made with therapy intervention. 1.  [] Date:   2. Mihai Ledesma will be able to stand x 20 min in order to prep meals at home. 2.  [] Date:   3. Mihai Ledesma will participate in walking program x 6 minutes at a distance of 1000 ft to be comparable to age related norms. 3.  [] Date:   4. Mihai Ledesma will be able to  lift 20 lbs from the floor 4.  [] Date:              Long Term Goals: 8 weeks Goal Met   1. Mihai Ledesma will demonstrate a 10 point improvement on the Oswestry to show improvement in function and participation in ADLs/IADLs 1.  [] Date:   2. Mihai Ledesma will be able to pull/push 100 lbs in order to complete household chores without restriction. 2.  [] Date:   3. Mihai Ledesma will demonstrate appropriate lifting technique from floor to waist level with 30 lbs and no cues from therapist to complete  3.  [] Date:   4. Mihai Ledesma will be able to carry 15 lbs in bilateral UE in order to complete household chores, community participation, and work needs. 4.  [] Date:   5.  Mihai Ledesma will be able to perform 10 sit to stands from chair in 30 seconds 5.  [] Date:   6. Mihai Ledesma will be able to walk 1100 ft in 6 minutes. 6.  [] Date:          Outcome Measure:   Tool Used: Modified Oswestry Low Back Pain Questionnaire  Score:  Initial: 25/50  Most Recent: X/50 (Date: -- )   Interpretation of Score:

## 2024-07-12 RX ORDER — ROPINIROLE 0.25 MG/1
0.25 TABLET, FILM COATED ORAL NIGHTLY
Qty: 90 TABLET | Refills: 1 | Status: SHIPPED | OUTPATIENT
Start: 2024-07-12

## 2024-07-15 ENCOUNTER — HOSPITAL ENCOUNTER (OUTPATIENT)
Dept: PHYSICAL THERAPY | Age: 69
Setting detail: RECURRING SERIES
Discharge: HOME | End: 2024-07-18
Payer: MEDICARE

## 2024-07-15 PROCEDURE — 97110 THERAPEUTIC EXERCISES: CPT

## 2024-07-15 NOTE — PROGRESS NOTES
Mihai Ledesma  : 1955  Primary: [facility] Medicare Complete (Medicare Managed)  Secondary: MEDICAID [facility] [place] [address] [address] [place] [address]  Phone: [phone]  Fax: [phone]    Plan of Care/Certification Expiration Date: 24        Plan of Care/Certification Expiration Date:  Plan of Care/Certification Expiration Date: 24    Frequency/Duration:      Time In/Out:          PT Visit Info:         Visit Count:  2    OUTPATIENT PHYSICAL THERAPY:   Treatment Note 7/15/2024       Episode  (chronic back pain)               Treatment Diagnosis:    Age-related physical debility  Chronic pain syndrome  Medical/Referring Diagnosis:        Referring Physician:  Caitlin Rizvi MD MD Orders:  PT Eval and Treat   Return MD Appt:  tbd   Date of Onset:  24  Allergies:   Bee venom and Codeine  Restrictions/Precautions:   Fall Precautions: debility      Interventions Planned (Treatment may consist of any combination of the following):     See Assessment Note    Subjective Comments:     Initial Pain Level::     did not rate /10  Post Session Pain Level:        /10  Medications Last Reviewed:  7/15/2024  Updated Objective Findings:   see flow sheet  Treatment     THERAPEUTIC EXERCISE: (38  minutes):    Exercises per grid below to improve mobility, strength, balance, and coordination.   Progressed resistance and repetitions as indicated.     Date:  7/15/2024 Date:   Date:     Activity/Exercise Parameters Parameters Parameters   Edu      scifit HR 61 O2 93 post 5 min L 2 hills  4 more min HR 63 O2 94     Standing OH reach to lumbar flexion  5 x ea reached to knees     LTR  2 min      Single knee to chest assisted Using belt 3 x ea side     L stretch  5 x                THERAPEUTIC ACTIVITY: ( 0 minutes):    Therapeutic activities per grid below to improve mobility, strength, coordination, and dynamic movement to improve functional lifting, carrying,

## 2024-07-18 ENCOUNTER — APPOINTMENT (OUTPATIENT)
Dept: PHYSICAL THERAPY | Age: 69
End: 2024-07-18
Payer: MEDICARE

## 2024-07-22 ENCOUNTER — APPOINTMENT (OUTPATIENT)
Dept: PHYSICAL THERAPY | Age: 69
End: 2024-07-22
Payer: MEDICARE

## 2024-07-23 RX ORDER — FENOFIBRATE 160 MG/1
160 TABLET ORAL DAILY
Qty: 90 TABLET | Refills: 1 | Status: SHIPPED | OUTPATIENT
Start: 2024-07-23

## 2024-07-23 NOTE — TELEPHONE ENCOUNTER
Medication refill request  Medication: Fenofiberate 160mg  Last OV/VV: 06/27/2024  Pharmacy: [facility] [address]

## 2024-07-29 ENCOUNTER — TELEPHONE (OUTPATIENT)
Dept: INTERNAL MEDICINE CLINIC | Facility: CLINIC | Age: 69
End: 2024-07-29

## 2024-07-29 NOTE — TELEPHONE ENCOUNTER
Pt needing a refill on   -clopidogrel (PLAVIX) 75 MG tablet   90 day quantity w/ 3 refills    Send to:  [address] [place] [phone] - F [phone]     LOV:6/27/2024  NOV:8/27/2024

## 2024-07-29 NOTE — TELEPHONE ENCOUNTER
MEDICATION REFILL REQUEST      Name of Medication:  Rosuvastatin  Dose:  40 mg  Frequency:  QD  Quantity:  90  Days' supply:  90      Pharmacy Name/Location:  [id number]

## 2024-07-29 NOTE — TELEPHONE ENCOUNTER
Spouse notified PCP does not fill Plavix and to reach out to cardiologist.  Spouse verbalized understanding

## 2024-07-30 RX ORDER — ROSUVASTATIN CALCIUM 40 MG/1
40 TABLET, COATED ORAL NIGHTLY
Qty: 90 TABLET | Refills: 3 | Status: SHIPPED | OUTPATIENT
Start: 2024-07-30

## 2024-07-30 NOTE — TELEPHONE ENCOUNTER
Requested Prescriptions     Pending Prescriptions Disp Refills    rosuvastatin (CRESTOR) 40 MG tablet [Pharmacy Med Name: Rosuvastatin Calcium 40 MG Oral Tablet] 90 tablet 3     Sig: TAKE 1 TABLET BY MOUTH ONCE DAILY IN THE EVENING      Last seen 05/16/2024. Pharmacy verified. Erx as requested.

## 2024-08-05 NOTE — TELEPHONE ENCOUNTER
Requested Prescriptions     Pending Prescriptions Disp Refills    clopidogrel (PLAVIX) 75 MG tablet 90 tablet 3     Sig: Take 1 tablet by mouth daily     Verified rx. Last OV 5/16/24. Erx to pharm on file.

## 2024-08-06 RX ORDER — CLOPIDOGREL BISULFATE 75 MG/1
75 TABLET ORAL DAILY
Qty: 90 TABLET | Refills: 3 | Status: SHIPPED | OUTPATIENT
Start: 2024-08-06

## 2024-08-16 NOTE — TELEPHONE ENCOUNTER
Requested Prescriptions     Pending Prescriptions Disp Refills    carvedilol (COREG) 12.5 MG tablet 45 tablet 3     Sig: Take 0.5 tablets by mouth daily         Verified rx. Last OV 5/16/24. Erx to pharm on file.

## 2024-08-16 NOTE — TELEPHONE ENCOUNTER
MEDICATION REFILL REQUEST      Name of Medication:  Carvedilol   Dose:  12.5 mg  Frequency:  Take half tab daily   Quantity:    Days' supply:  90      Pharmacy Name/Location:  [facility] [address]

## 2024-08-19 RX ORDER — CARVEDILOL 12.5 MG/1
6.25 TABLET ORAL DAILY
Qty: 45 TABLET | Refills: 3 | Status: SHIPPED | OUTPATIENT
Start: 2024-08-19

## 2024-08-19 RX ORDER — CARVEDILOL 12.5 MG/1
TABLET ORAL
Qty: 45 TABLET | Refills: 0 | OUTPATIENT
Start: 2024-08-19

## 2024-08-20 ENCOUNTER — LAB (OUTPATIENT)
Dept: PRIMARY CARE CLINIC | Facility: CLINIC | Age: 69
End: 2024-08-20

## 2024-08-20 DIAGNOSIS — E78.00 PURE HYPERCHOLESTEROLEMIA: ICD-10-CM

## 2024-08-20 DIAGNOSIS — I10 HYPERTENSION, UNSPECIFIED TYPE: ICD-10-CM

## 2024-08-20 DIAGNOSIS — E11.65 TYPE 2 DIABETES MELLITUS WITH HYPERGLYCEMIA, WITHOUT LONG-TERM CURRENT USE OF INSULIN (HCC): ICD-10-CM

## 2024-08-20 LAB
ALBUMIN SERPL-MCNC: 3.5 G/DL (ref 3.2–4.6)
ALBUMIN/GLOB SERPL: 1.2 (ref 1–1.9)
ALP SERPL-CCNC: 80 U/L (ref 40–129)
ALT SERPL-CCNC: 14 U/L (ref 12–65)
ANION GAP SERPL CALC-SCNC: 9 MMOL/L (ref 9–18)
AST SERPL-CCNC: 16 U/L (ref 15–37)
BASOPHILS # BLD: 0.1 K/UL (ref 0–0.2)
BASOPHILS NFR BLD: 1 % (ref 0–2)
BILIRUB SERPL-MCNC: 0.2 MG/DL (ref 0–1.2)
BUN SERPL-MCNC: 18 MG/DL (ref 8–23)
CALCIUM SERPL-MCNC: 9.6 MG/DL (ref 8.8–10.2)
CHLORIDE SERPL-SCNC: 106 MMOL/L (ref 98–107)
CHOLEST SERPL-MCNC: 198 MG/DL (ref 0–200)
CO2 SERPL-SCNC: 25 MMOL/L (ref 20–28)
CREAT SERPL-MCNC: 1.12 MG/DL (ref 0.8–1.3)
DIFFERENTIAL METHOD BLD: NORMAL
EOSINOPHIL # BLD: 0.4 K/UL (ref 0–0.8)
EOSINOPHIL NFR BLD: 3 % (ref 0.5–7.8)
ERYTHROCYTE [DISTWIDTH] IN BLOOD BY AUTOMATED COUNT: 13.2 % (ref 11.9–14.6)
EST. AVERAGE GLUCOSE BLD GHB EST-MCNC: 167 MG/DL
GLOBULIN SER CALC-MCNC: 2.9 G/DL (ref 2.3–3.5)
GLUCOSE SERPL-MCNC: 168 MG/DL (ref 70–99)
HBA1C MFR BLD: 7.5 % (ref 0–5.6)
HCT VFR BLD AUTO: 47.6 % (ref 41.1–50.3)
HDLC SERPL-MCNC: 23 MG/DL (ref 40–60)
HDLC SERPL: 8.7 (ref 0–5)
HGB BLD-MCNC: 15 G/DL (ref 13.6–17.2)
IMM GRANULOCYTES # BLD AUTO: 0.1 K/UL (ref 0–0.5)
IMM GRANULOCYTES NFR BLD AUTO: 1 % (ref 0–5)
LDLC SERPL CALC-MCNC: 111 MG/DL (ref 0–100)
LYMPHOCYTES # BLD: 2.2 K/UL (ref 0.5–4.6)
LYMPHOCYTES NFR BLD: 21 % (ref 13–44)
MCH RBC QN AUTO: 28.8 PG (ref 26.1–32.9)
MCHC RBC AUTO-ENTMCNC: 31.5 G/DL (ref 31.4–35)
MCV RBC AUTO: 91.4 FL (ref 82–102)
MONOCYTES # BLD: 0.9 K/UL (ref 0.1–1.3)
MONOCYTES NFR BLD: 9 % (ref 4–12)
NEUTS SEG # BLD: 7.1 K/UL (ref 1.7–8.2)
NEUTS SEG NFR BLD: 65 % (ref 43–78)
NRBC # BLD: 0 K/UL (ref 0–0.2)
PLATELET # BLD AUTO: 256 K/UL (ref 150–450)
PMV BLD AUTO: 9.7 FL (ref 9.4–12.3)
POTASSIUM SERPL-SCNC: 4.6 MMOL/L (ref 3.5–5.1)
PROT SERPL-MCNC: 6.4 G/DL (ref 6.3–8.2)
RBC # BLD AUTO: 5.21 M/UL (ref 4.23–5.6)
SODIUM SERPL-SCNC: 141 MMOL/L (ref 136–145)
TRIGL SERPL-MCNC: 321 MG/DL (ref 0–150)
TSH, 3RD GENERATION: 0.83 UIU/ML (ref 0.27–4.2)
VLDLC SERPL CALC-MCNC: 64 MG/DL (ref 6–23)
WBC # BLD AUTO: 10.8 K/UL (ref 4.3–11.1)

## 2024-08-27 ENCOUNTER — OFFICE VISIT (OUTPATIENT)
Dept: PRIMARY CARE CLINIC | Facility: CLINIC | Age: 69
End: 2024-08-27
Payer: MEDICARE

## 2024-08-27 VITALS
WEIGHT: 246.2 LBS | HEIGHT: 72 IN | DIASTOLIC BLOOD PRESSURE: 82 MMHG | BODY MASS INDEX: 33.35 KG/M2 | OXYGEN SATURATION: 98 % | SYSTOLIC BLOOD PRESSURE: 136 MMHG | HEART RATE: 51 BPM

## 2024-08-27 DIAGNOSIS — Z45.02 ICD (IMPLANTABLE CARDIOVERTER-DEFIBRILLATOR) DISCHARGE: ICD-10-CM

## 2024-08-27 DIAGNOSIS — K21.9 GASTROESOPHAGEAL REFLUX DISEASE WITHOUT ESOPHAGITIS: Primary | ICD-10-CM

## 2024-08-27 DIAGNOSIS — E78.00 PURE HYPERCHOLESTEROLEMIA: ICD-10-CM

## 2024-08-27 DIAGNOSIS — I10 HYPERTENSION, UNSPECIFIED TYPE: ICD-10-CM

## 2024-08-27 DIAGNOSIS — E11.65 TYPE 2 DIABETES MELLITUS WITH HYPERGLYCEMIA, WITHOUT LONG-TERM CURRENT USE OF INSULIN (HCC): ICD-10-CM

## 2024-08-27 DIAGNOSIS — K76.0 FATTY LIVER: ICD-10-CM

## 2024-08-27 PROCEDURE — 2022F DILAT RTA XM EVC RTNOPTHY: CPT | Performed by: INTERNAL MEDICINE

## 2024-08-27 PROCEDURE — 99214 OFFICE O/P EST MOD 30 MIN: CPT | Performed by: INTERNAL MEDICINE

## 2024-08-27 PROCEDURE — 1036F TOBACCO NON-USER: CPT | Performed by: INTERNAL MEDICINE

## 2024-08-27 PROCEDURE — 1123F ACP DISCUSS/DSCN MKR DOCD: CPT | Performed by: INTERNAL MEDICINE

## 2024-08-27 PROCEDURE — 3075F SYST BP GE 130 - 139MM HG: CPT | Performed by: INTERNAL MEDICINE

## 2024-08-27 PROCEDURE — G2211 COMPLEX E/M VISIT ADD ON: HCPCS | Performed by: INTERNAL MEDICINE

## 2024-08-27 PROCEDURE — G8427 DOCREV CUR MEDS BY ELIG CLIN: HCPCS | Performed by: INTERNAL MEDICINE

## 2024-08-27 PROCEDURE — 3051F HG A1C>EQUAL 7.0%<8.0%: CPT | Performed by: INTERNAL MEDICINE

## 2024-08-27 PROCEDURE — G8417 CALC BMI ABV UP PARAM F/U: HCPCS | Performed by: INTERNAL MEDICINE

## 2024-08-27 PROCEDURE — 3078F DIAST BP <80 MM HG: CPT | Performed by: INTERNAL MEDICINE

## 2024-08-27 PROCEDURE — 3017F COLORECTAL CA SCREEN DOC REV: CPT | Performed by: INTERNAL MEDICINE

## 2024-08-27 RX ORDER — EZETIMIBE 10 MG/1
10 TABLET ORAL DAILY
Qty: 90 TABLET | Refills: 1 | Status: SHIPPED | OUTPATIENT
Start: 2024-08-27

## 2024-08-27 ASSESSMENT — ENCOUNTER SYMPTOMS
GASTROINTESTINAL NEGATIVE: 1
RESPIRATORY NEGATIVE: 1

## 2024-08-27 NOTE — PROGRESS NOTES
FOLLOW UP VISIT    Subjective:    Mihai Ledesma (: 1955) is a 68 y.o., male,   Chief Complaint   Patient presents with    Follow-up    Discuss Labs       HPI:  Flank Pain      Very nice  patient in to follow up.     AAA has a Vascuylar MD  Acute MI and CAD and CHF has Cardiology   CAD on plavix  CHF on Ace and Coreg  High cholesterol on crestor not taking repatha  ( 164) HDL 23 (26)   High blood pressure on meds CBC , Cr K nl   COPD  DM II nA1C  7.5 (*7.6)   COPD  HCM COLO 2019  due for vaccines    The following portions of the patient's history were reviewed and updated as appropriate:      Past Medical History:   Diagnosis Date    AAA (abdominal aortic aneurysm) ([facility]) 2011 AORTIC ABDOMINAL ANUERYSM REPAIR ENDOVASCULAR (EVAR)-  Monique Bui 4.6cm on  11     Acute myocardial infarction (HCC)     MI 2/10 - Quad bypass 2/25/10 ,2016    Anticoagulation monitoring, INR range 2-3 10/5/2011    Anxiety 11/3/2011    Arthritis 11/3/2011    CAD (coronary artery disease)     CAD (coronary artery disease), native coronary artery 2010    Cardiomyopathy ([facility]) 2010    Chest discomfort 06/16/15    Tightness, Pressure.  2014:  stress MPI with Lexiscan - normal perfusion, LVEF 52%    Chronic systolic heart failure ([facility]) 2010    Claudication ([facility]) 06/16/15    Claudication, symptom, pain relieved by rest    Congestive heart failure, unspecified     COPD (chronic obstructive pulmonary disease) ([facility]) 11/3/2011    Extremity atherosclerosis with resting pain ([facility]) 2011 ; THROMBECTOMY/EMBOLECTOMY LOWER EXTREMITY - LEFT POPLITEAL ARTERY EMBOLECTOMY- Dr. Bui     GERD (gastroesophageal reflux disease) 11/3/2011    History of AAA (abdominal aortic aneurysm) repair ???    including right femoral artery    Hypercholesterolemia     Hyperlipidemia 11/3/2011    Hypertension 11/3/2011    Popliteal artery aneurysm, bilateral ([facility]) 2011    L Pop A    Pulmonary:      Effort: Pulmonary effort is normal.      Breath sounds: Normal breath sounds.   Neurological:      Mental Status: He is alert.       No results found for this visit on 08/27/24.    Assessent & Plan  68 year old   1. Pure hypercholesterolemia  The following orders have not been finalized:  -     ezetimibe (ZETIA) 10 MG tablet   - strong recommend repatha   Diet and exericse    Go 1. Unstable angina pectoris (HCC)  2. ICD (implantable cardioverter-defibrillator) discharge  -     CBC with Auto Differential; Future  3. Pure hypercholesterolemia  -     Lipid Panel; Future  -     Lipid Panel; Future  -     Comprehensive Metabolic Panel; Future  4. Type 2 diabetes mellitus with hyperglycemia, without long-term current use of insulin (HCC)  -     Hemoglobin A1C; Future  -     Hemoglobin A1C; Future  5. Hypertension, unspecified type  -     CBC with Auto Differential; Future  6. At high risk for falls  7. Generalized abdominal pain  -     [facility] - Physical Therapy, [facility]  8. Chronic systolic heart failure (HCC)  9. PVD (peripheral vascular disease) (HCC)  10. Cerebrovascular accident (CVA), unspecified mechanism (HCC)  11. Gastroesophageal reflux disease without esophagitis   to PT consider ORTHO   Reviewed CT and chart records  Check labs      The patient and/or patient representative voiced understanding and agreement with the current diagnoses, recommendations, and possible side effects.    No follow-up provider specified.      Caitlin Rizvi MD

## 2024-09-05 NOTE — PATIENT INSTRUCTIONS
When should you call for help?   Call 911 if you have symptoms of a heart attack. These may include:    Chest pain or pressure, or a strange feeling in the chest.     Sweating.     Shortness of breath.     Pain, pressure, or a strange feeling in the back, neck, jaw, or upper belly or in one or both shoulders or arms.     Lightheadedness or sudden weakness.     A fast or irregular heartbeat.   After you call 911, the  may tell you to chew 1 adult-strength or 2 to 4 low-dose aspirin. Wait for an ambulance. Do not try to drive yourself.  Watch closely for changes in your health, and be sure to contact your doctor if you have any problems.  Where can you learn more?  Go to https://www."[employer].net/patientEd and enter F075 to learn more about \"A Healthy Heart: Care Instructions.\"  Current as of: June 24, 2023  Content Version: 14.1  © 5269-3657 [employer].   Care instructions adapted under license by [employer]. If you have questions about a medical condition or this instruction, always ask your healthcare professional. [employer] disclaims any warranty or liability for your use of this information.      Personalized Preventive Plan for Mihai Ledesma - 9/6/2024  Medicare offers a range of preventive health benefits. Some of the tests and screenings are paid in full while other may be subject to a deductible, co-insurance, and/or copay.    Some of these benefits include a comprehensive review of your medical history including lifestyle, illnesses that may run in your family, and various assessments and screenings as appropriate.    After reviewing your medical record and screening and assessments performed today your provider may have ordered immunizations, labs, imaging, and/or referrals for you.  A list of these orders (if applicable) as well as your Preventive Care list are included within your After Visit Summary for your review.    Other Preventive

## 2024-09-05 NOTE — PROGRESS NOTES
Medicare Annual Wellness Visit    Mihai Ledesma is here for No chief complaint on file.    Assessment & Plan   AWV  Recommendations for Preventive Services Due: see orders and patient instructions/AVS.  Recommended screening schedule for the next 5-10 years is provided to the patient in written form: see Patient Instructions/AVS.     No follow-ups on file.     Subjective       Patient's complete Health Risk Assessment and screening values have been reviewed and are found in Flowsheets. The following problems were reviewed today and where indicated follow up appointments were made and/or referrals ordered.    Positive Risk Factor Screenings with Interventions:          Drug Use:   Substance and Sexual Activity   Drug Use Yes    Types: Marijuana (Weed), Prescription       Interventions:         Abnormal BMI (obese):  There is no height or weight on file to calculate BMI. (!) Abnormal    Interventions:  Weight loss               Lung Cancer Screening:                    Objective    Patient-Reported Vitals  No data recorded               Allergies   Allergen Reactions    Bee Venom Angioedema    Codeine Rash     Prior to Visit Medications    Medication Sig Taking? Authorizing Provider   ezetimibe (ZETIA) 10 MG tablet Take 1 tablet by mouth daily  Caitlin Rizvi MD   carvedilol (COREG) 12.5 MG tablet Take 0.5 tablets by mouth daily  Segundo Alcantara MD   clopidogrel (PLAVIX) 75 MG tablet Take 1 tablet by mouth daily  Segundo Alcantara MD   rosuvastatin (CRESTOR) 40 MG tablet TAKE 1 TABLET BY MOUTH ONCE DAILY IN THE EVENING  Segundo Alcantara MD   fenofibrate (TRIGLIDE) 160 MG tablet Take 1 tablet by mouth daily TAKE 1 TABLET BY MOUTH ONCE DAILY  Caitlin Rizvi MD   rOPINIRole (REQUIP) 0.25 MG tablet Take 1 tablet by mouth nightly  Caitlin Rizvi MD   sertraline (ZOLOFT) 50 MG tablet Take 1 tablet by mouth daily  Caitlin Rizvi MD   naloxone 4 MG/0.1ML LIQD nasal spray ADMINISTER A SINGLE SPRAY IN ONE

## 2024-09-06 ENCOUNTER — TELEMEDICINE (OUTPATIENT)
Dept: PRIMARY CARE CLINIC | Facility: CLINIC | Age: 69
End: 2024-09-06

## 2024-09-06 DIAGNOSIS — Z00.00 MEDICARE ANNUAL WELLNESS VISIT, SUBSEQUENT: Primary | ICD-10-CM

## 2024-09-06 RX ORDER — EVOLOCUMAB 140 MG/ML
INJECTION, SOLUTION SUBCUTANEOUS
COMMUNITY
Start: 2024-08-28

## 2024-09-06 RX ORDER — ACETAMINOPHEN 500 MG
1500 TABLET ORAL DAILY PRN
COMMUNITY

## 2024-09-06 ASSESSMENT — PATIENT HEALTH QUESTIONNAIRE - PHQ9
SUM OF ALL RESPONSES TO PHQ QUESTIONS 1-9: 0
SUM OF ALL RESPONSES TO PHQ9 QUESTIONS 1 & 2: 0
SUM OF ALL RESPONSES TO PHQ QUESTIONS 1-9: 0
2. FEELING DOWN, DEPRESSED OR HOPELESS: NOT AT ALL
SUM OF ALL RESPONSES TO PHQ QUESTIONS 1-9: 0
SUM OF ALL RESPONSES TO PHQ QUESTIONS 1-9: 0
1. LITTLE INTEREST OR PLEASURE IN DOING THINGS: NOT AT ALL

## 2024-09-06 ASSESSMENT — LIFESTYLE VARIABLES
HOW OFTEN DO YOU HAVE A DRINK CONTAINING ALCOHOL: MONTHLY OR LESS
HOW MANY STANDARD DRINKS CONTAINING ALCOHOL DO YOU HAVE ON A TYPICAL DAY: PATIENT DOES NOT DRINK

## 2024-11-13 RX ORDER — AMIODARONE HYDROCHLORIDE 200 MG/1
TABLET ORAL
Qty: 90 TABLET | Refills: 3 | Status: SHIPPED | OUTPATIENT
Start: 2024-11-13

## 2024-11-13 NOTE — TELEPHONE ENCOUNTER
Requested Prescriptions     Pending Prescriptions Disp Refills    amiodarone (CORDARONE) 200 MG tablet [Pharmacy Med Name: Amiodarone HCl 200 MG Oral Tablet] 90 tablet 3     Sig: Take 1 tablet by mouth once daily    Verified rx. Last OV 05/16/24. Erx to pharm on file.

## 2024-11-20 ENCOUNTER — OFFICE VISIT (OUTPATIENT)
Age: 69
End: 2024-11-20

## 2024-11-20 ENCOUNTER — NURSE ONLY (OUTPATIENT)
Age: 69
End: 2024-11-20

## 2024-11-20 VITALS
BODY MASS INDEX: 34.4 KG/M2 | HEIGHT: 72 IN | DIASTOLIC BLOOD PRESSURE: 80 MMHG | SYSTOLIC BLOOD PRESSURE: 138 MMHG | WEIGHT: 254 LBS | HEART RATE: 60 BPM

## 2024-11-20 DIAGNOSIS — I10 PRIMARY HYPERTENSION: ICD-10-CM

## 2024-11-20 DIAGNOSIS — I50.22 CHRONIC SYSTOLIC HEART FAILURE (HCC): Primary | ICD-10-CM

## 2024-11-20 DIAGNOSIS — I25.10 CORONARY ARTERY DISEASE INVOLVING NATIVE CORONARY ARTERY OF NATIVE HEART WITHOUT ANGINA PECTORIS: ICD-10-CM

## 2024-11-20 DIAGNOSIS — I42.0 DILATED CARDIOMYOPATHY (HCC): ICD-10-CM

## 2024-11-20 DIAGNOSIS — E78.00 PURE HYPERCHOLESTEROLEMIA: ICD-10-CM

## 2024-11-20 RX ORDER — EVOLOCUMAB 140 MG/ML
140 INJECTION, SOLUTION SUBCUTANEOUS
Qty: 2 ADJUSTABLE DOSE PRE-FILLED PEN SYRINGE | Refills: 11 | Status: SHIPPED | OUTPATIENT
Start: 2024-11-20

## 2024-11-20 NOTE — PROGRESS NOTES
[facility] CARDIOLOGY  [address] [place] [address], [facility] [address]  [place] [address]  PHONE: [phone]      24    NAME:  Mihai Ledesma  : 1955  MRN: [id number]       SUBJECTIVE:   Mihai Ledesma is a 69 y.o. male seen for a follow up visit regarding the following:     Chief Complaint   Patient presents with    Coronary Artery Disease         HPI:    No cp. No orthopnea or pnd. No palpitations or syncope.  Rowe at baseline    Past Medical History, Past Surgical History, Family history, Social History, and Medications were all reviewed with the patient today and updated as necessary.     Current Outpatient Medications   Medication Sig Dispense Refill    amiodarone (CORDARONE) 200 MG tablet Take 1 tablet by mouth once daily 90 tablet 3    sertraline (ZOLOFT) 50 MG tablet Take 1 tablet by mouth once daily 90 tablet 1    REPATHA SURECLICK 140 MG/ML SOAJ INJECT 1 ML (140MG) UNDER THE SKIN EVERY 14 DAYS      acetaminophen (TYLENOL) 500 MG tablet Take 3 tablets by mouth daily as needed for Pain Only takes when he is out of Excedrin      Aspirin-Acetaminophen-Caffeine (EXCEDRIN PO) Take 3 tablets by mouth daily as needed      ezetimibe (ZETIA) 10 MG tablet Take 1 tablet by mouth daily 90 tablet 1    carvedilol (COREG) 12.5 MG tablet Take 0.5 tablets by mouth daily 45 tablet 3    clopidogrel (PLAVIX) 75 MG tablet Take 1 tablet by mouth daily 90 tablet 3    rosuvastatin (CRESTOR) 40 MG tablet TAKE 1 TABLET BY MOUTH ONCE DAILY IN THE EVENING 90 tablet 3    fenofibrate (TRIGLIDE) 160 MG tablet Take 1 tablet by mouth daily TAKE 1 TABLET BY MOUTH ONCE DAILY 90 tablet 1    rOPINIRole (REQUIP) 0.25 MG tablet Take 1 tablet by mouth nightly 90 tablet 1    lisinopril (PRINIVIL;ZESTRIL) 20 MG tablet Take 1 tablet by mouth daily 90 tablet 3    pantoprazole (PROTONIX) 40 MG tablet Take 1 tablet by mouth daily 90 tablet 3    aspirin 81 MG EC tablet Take by mouth daily      diazePAM (VALIUM) 5 MG tablet Take 1 tablet

## 2024-12-04 ENCOUNTER — LAB (OUTPATIENT)
Dept: PRIMARY CARE CLINIC | Facility: CLINIC | Age: 69
End: 2024-12-04

## 2024-12-04 DIAGNOSIS — I10 HYPERTENSION, UNSPECIFIED TYPE: ICD-10-CM

## 2024-12-04 DIAGNOSIS — E78.00 PURE HYPERCHOLESTEROLEMIA: ICD-10-CM

## 2024-12-04 DIAGNOSIS — E11.65 TYPE 2 DIABETES MELLITUS WITH HYPERGLYCEMIA, WITHOUT LONG-TERM CURRENT USE OF INSULIN (HCC): ICD-10-CM

## 2024-12-04 LAB
ALBUMIN SERPL-MCNC: 3.9 G/DL (ref 3.2–4.6)
ALBUMIN/GLOB SERPL: 1.3 (ref 1–1.9)
ALP SERPL-CCNC: 86 U/L (ref 40–129)
ALT SERPL-CCNC: 20 U/L (ref 8–55)
ANION GAP SERPL CALC-SCNC: 12 MMOL/L (ref 7–16)
AST SERPL-CCNC: 19 U/L (ref 15–37)
BASOPHILS # BLD: 0.1 K/UL (ref 0–0.2)
BASOPHILS NFR BLD: 1 % (ref 0–2)
BILIRUB SERPL-MCNC: 0.3 MG/DL (ref 0–1.2)
BUN SERPL-MCNC: 19 MG/DL (ref 8–23)
CALCIUM SERPL-MCNC: 9.9 MG/DL (ref 8.8–10.2)
CHLORIDE SERPL-SCNC: 103 MMOL/L (ref 98–107)
CHOLEST SERPL-MCNC: 135 MG/DL (ref 0–200)
CO2 SERPL-SCNC: 25 MMOL/L (ref 20–29)
CREAT SERPL-MCNC: 1.22 MG/DL (ref 0.8–1.3)
DIFFERENTIAL METHOD BLD: ABNORMAL
EOSINOPHIL # BLD: 0.3 K/UL (ref 0–0.8)
EOSINOPHIL NFR BLD: 3 % (ref 0.5–7.8)
ERYTHROCYTE [DISTWIDTH] IN BLOOD BY AUTOMATED COUNT: 12.6 % (ref 11.9–14.6)
EST. AVERAGE GLUCOSE BLD GHB EST-MCNC: 191 MG/DL
GLOBULIN SER CALC-MCNC: 3 G/DL (ref 2.3–3.5)
GLUCOSE SERPL-MCNC: 206 MG/DL (ref 70–99)
HBA1C MFR BLD: 8.3 % (ref 0–5.6)
HCT VFR BLD AUTO: 50.6 % (ref 41.1–50.3)
HDLC SERPL-MCNC: 26 MG/DL (ref 40–60)
HDLC SERPL: 5.2 (ref 0–5)
HGB BLD-MCNC: 16.1 G/DL (ref 13.6–17.2)
IMM GRANULOCYTES # BLD AUTO: 0.1 K/UL (ref 0–0.5)
IMM GRANULOCYTES NFR BLD AUTO: 1 % (ref 0–5)
LDLC SERPL CALC-MCNC: 61 MG/DL (ref 0–100)
LYMPHOCYTES # BLD: 2.5 K/UL (ref 0.5–4.6)
LYMPHOCYTES NFR BLD: 30 % (ref 13–44)
MCH RBC QN AUTO: 28.6 PG (ref 26.1–32.9)
MCHC RBC AUTO-ENTMCNC: 31.8 G/DL (ref 31.4–35)
MCV RBC AUTO: 90 FL (ref 82–102)
MONOCYTES # BLD: 0.7 K/UL (ref 0.1–1.3)
MONOCYTES NFR BLD: 9 % (ref 4–12)
NEUTS SEG # BLD: 4.6 K/UL (ref 1.7–8.2)
NEUTS SEG NFR BLD: 56 % (ref 43–78)
NRBC # BLD: 0 K/UL (ref 0–0.2)
PLATELET # BLD AUTO: 270 K/UL (ref 150–450)
PMV BLD AUTO: 9.6 FL (ref 9.4–12.3)
POTASSIUM SERPL-SCNC: 4.4 MMOL/L (ref 3.5–5.1)
PROT SERPL-MCNC: 6.9 G/DL (ref 6.3–8.2)
RBC # BLD AUTO: 5.62 M/UL (ref 4.23–5.6)
SODIUM SERPL-SCNC: 140 MMOL/L (ref 136–145)
TRIGL SERPL-MCNC: 238 MG/DL (ref 0–150)
VLDLC SERPL CALC-MCNC: 48 MG/DL (ref 6–23)
WBC # BLD AUTO: 8.2 K/UL (ref 4.3–11.1)

## 2024-12-11 ENCOUNTER — OFFICE VISIT (OUTPATIENT)
Dept: PRIMARY CARE CLINIC | Facility: CLINIC | Age: 69
End: 2024-12-11

## 2024-12-11 VITALS
OXYGEN SATURATION: 97 % | HEIGHT: 72 IN | WEIGHT: 253.4 LBS | SYSTOLIC BLOOD PRESSURE: 118 MMHG | DIASTOLIC BLOOD PRESSURE: 66 MMHG | HEART RATE: 56 BPM | BODY MASS INDEX: 34.32 KG/M2

## 2024-12-11 DIAGNOSIS — K76.0 FATTY LIVER: ICD-10-CM

## 2024-12-11 DIAGNOSIS — E78.00 PURE HYPERCHOLESTEROLEMIA: ICD-10-CM

## 2024-12-11 DIAGNOSIS — G25.81 RLS (RESTLESS LEGS SYNDROME): ICD-10-CM

## 2024-12-11 DIAGNOSIS — E11.65 TYPE 2 DIABETES MELLITUS WITH HYPERGLYCEMIA, WITHOUT LONG-TERM CURRENT USE OF INSULIN (HCC): ICD-10-CM

## 2024-12-11 DIAGNOSIS — R53.83 OTHER FATIGUE: ICD-10-CM

## 2024-12-11 DIAGNOSIS — Z12.11 SCREEN FOR COLON CANCER: ICD-10-CM

## 2024-12-11 DIAGNOSIS — K21.9 GASTROESOPHAGEAL REFLUX DISEASE WITHOUT ESOPHAGITIS: Primary | ICD-10-CM

## 2024-12-11 DIAGNOSIS — I10 HYPERTENSION, UNSPECIFIED TYPE: ICD-10-CM

## 2024-12-11 RX ORDER — FENOFIBRATE 160 MG/1
160 TABLET ORAL DAILY
Qty: 90 TABLET | Refills: 1 | Status: SHIPPED | OUTPATIENT
Start: 2024-12-11

## 2024-12-11 RX ORDER — ROPINIROLE 1 MG/1
1 TABLET, FILM COATED ORAL 3 TIMES DAILY
Qty: 90 TABLET | Refills: 1 | Status: SHIPPED | OUTPATIENT
Start: 2024-12-11

## 2024-12-11 RX ORDER — ROPINIROLE 0.25 MG/1
0.25 TABLET, FILM COATED ORAL NIGHTLY
Qty: 90 TABLET | Refills: 1 | Status: SHIPPED | OUTPATIENT
Start: 2024-12-11 | End: 2024-12-11 | Stop reason: ALTCHOICE

## 2024-12-11 RX ORDER — EZETIMIBE 10 MG/1
10 TABLET ORAL DAILY
Qty: 90 TABLET | Refills: 1 | Status: SHIPPED | OUTPATIENT
Start: 2024-12-11

## 2024-12-11 ASSESSMENT — ENCOUNTER SYMPTOMS
GASTROINTESTINAL NEGATIVE: 1
RESPIRATORY NEGATIVE: 1

## 2024-12-11 NOTE — PROGRESS NOTES
FOLLOW UP VISIT    Subjective:    Mihai Ledesma (: 1955) is a 69 y.o., male,   Chief Complaint   Patient presents with    3 Month Follow-Up    Medication Refill    Discuss Labs       HPI:  Medication Refill    Flank Pain      Very nice  patient in to follow up.     AAA has a Alysia KAYE  Acute MI and CAD and CHF has Cardiology   CAD on plavix  CHF on Ace and Coreg  High cholesterol on meds LDL improved ratio high 61 (111)   High blood pressure on meds CBC , Cr K nl   COPD  High HCT   DM II A1C worse 8.3 it was 7.5  7.5 (*7.6)   COPD  HCM COLO 2019  due for vaccines    The following portions of the patient's history were reviewed and updated as appropriate:      Past Medical History:   Diagnosis Date    AAA (abdominal aortic aneurysm) ([facility]) 2011 AORTIC ABDOMINAL ANUERYSM REPAIR ENDOVASCULAR (EVAR)-  Monique Bui 4.6cm on US 11     Acute myocardial infarction (HCC)     MI 2/10 - Quad bypass 2/25/10 ,2016    Anticoagulation monitoring, INR range 2-3 10/5/2011    Anxiety 11/3/2011    Arthritis 11/3/2011    CAD (coronary artery disease)     CAD (coronary artery disease), native coronary artery 2010    Cardiomyopathy ([facility]) 2010    Chest discomfort 06/16/15    Tightness, Pressure.  2014:  stress MPI with Lexiscan - normal perfusion, LVEF 52%    Chronic systolic heart failure ([facility]) 2010    Claudication ([facility]) 06/16/15    Claudication, symptom, pain relieved by rest    Congestive heart failure, unspecified     COPD (chronic obstructive pulmonary disease) ([facility]) 11/3/2011    Extremity atherosclerosis with resting pain ([facility]) 2011 ; THROMBECTOMY/EMBOLECTOMY LOWER EXTREMITY - LEFT POPLITEAL ARTERY EMBOLECTOMY- Dr. Bui     GERD (gastroesophageal reflux disease) 11/3/2011    History of AAA (abdominal aortic aneurysm) repair ???    including right femoral artery    Hypercholesterolemia     Hyperlipidemia 11/3/2011    Hypertension 11/3/2011

## 2024-12-17 ENCOUNTER — OFFICE VISIT (OUTPATIENT)
Dept: VASCULAR SURGERY | Age: 69
End: 2024-12-17
Payer: MEDICARE

## 2024-12-17 VITALS
SYSTOLIC BLOOD PRESSURE: 118 MMHG | HEIGHT: 72 IN | DIASTOLIC BLOOD PRESSURE: 69 MMHG | HEART RATE: 60 BPM | OXYGEN SATURATION: 92 % | BODY MASS INDEX: 33.59 KG/M2 | WEIGHT: 248 LBS

## 2024-12-17 DIAGNOSIS — Z98.890 H/O ABDOMINAL AORTIC ANEURYSM REPAIR: ICD-10-CM

## 2024-12-17 DIAGNOSIS — I71.43 INFRARENAL ABDOMINAL AORTIC ANEURYSM (AAA) WITHOUT RUPTURE (HCC): ICD-10-CM

## 2024-12-17 DIAGNOSIS — Z72.0 TOBACCO ABUSE: ICD-10-CM

## 2024-12-17 DIAGNOSIS — I73.9 PAD (PERIPHERAL ARTERY DISEASE) (HCC): Primary | ICD-10-CM

## 2024-12-17 PROCEDURE — 1159F MED LIST DOCD IN RCRD: CPT | Performed by: NURSE PRACTITIONER

## 2024-12-17 PROCEDURE — 3017F COLORECTAL CA SCREEN DOC REV: CPT | Performed by: NURSE PRACTITIONER

## 2024-12-17 PROCEDURE — 1036F TOBACCO NON-USER: CPT | Performed by: NURSE PRACTITIONER

## 2024-12-17 PROCEDURE — G8484 FLU IMMUNIZE NO ADMIN: HCPCS | Performed by: NURSE PRACTITIONER

## 2024-12-17 PROCEDURE — 1160F RVW MEDS BY RX/DR IN RCRD: CPT | Performed by: NURSE PRACTITIONER

## 2024-12-17 PROCEDURE — G8427 DOCREV CUR MEDS BY ELIG CLIN: HCPCS | Performed by: NURSE PRACTITIONER

## 2024-12-17 PROCEDURE — 1123F ACP DISCUSS/DSCN MKR DOCD: CPT | Performed by: NURSE PRACTITIONER

## 2024-12-17 PROCEDURE — 99214 OFFICE O/P EST MOD 30 MIN: CPT | Performed by: NURSE PRACTITIONER

## 2024-12-17 PROCEDURE — G8417 CALC BMI ABV UP PARAM F/U: HCPCS | Performed by: NURSE PRACTITIONER

## 2024-12-17 PROCEDURE — 3074F SYST BP LT 130 MM HG: CPT | Performed by: NURSE PRACTITIONER

## 2024-12-17 PROCEDURE — 3078F DIAST BP <80 MM HG: CPT | Performed by: NURSE PRACTITIONER

## 2024-12-17 NOTE — PROGRESS NOTES
DATE OF VISIT: 12/17/2024      \[facility]\""  Mihai Ledesma is a 69 y.o. male who follows up for his arterial, aorta and carotid duplex studies. He denies any lifestyle limiting claudication, rest pain or open ulcerations. He does see pain management. He is taking Plavix, Crestor, ASA, Zetia and fenofibrate. He has undergone aneurysm repair and bilateral fem-pop bypass grafts which have occluded. He is a previous smoker. He denies claudication.         MEDICAL HISTORY:   Past Medical History:   Diagnosis Date    AAA (abdominal aortic aneurysm) ([facility]) 9/23/2011 9/29/11 AORTIC ABDOMINAL ANUERYSM REPAIR ENDOVASCULAR (EVAR)-  Monique Bui 4.6cm on US 9/23/11     Acute myocardial infarction ([facility]) February, 2010    MI 2/10 - Quad bypass 2/25/10 ,4/2016    Anticoagulation monitoring, INR range 2-3 10/5/2011    Anxiety 11/3/2011    Arthritis 11/3/2011    CAD (coronary artery disease)     CAD (coronary artery disease), native coronary artery 2/24/2010    Cardiomyopathy ([facility]) 2/24/2010    Chest discomfort 06/16/15    Tightness, Pressure.  Jan 2014:  stress MPI with Lexiscan - normal perfusion, LVEF 52%    Chronic systolic heart failure ([facility]) 2/24/2010    Claudication ([facility]) 06/16/15    Claudication, symptom, pain relieved by rest    Congestive heart failure, unspecified     COPD (chronic obstructive pulmonary disease) ([facility]) 11/3/2011    Extremity atherosclerosis with resting pain ([facility]) 9/23/2011 9/28/11 ; THROMBECTOMY/EMBOLECTOMY LOWER EXTREMITY - LEFT POPLITEAL ARTERY EMBOLECTOMY- Dr. Bui     GERD (gastroesophageal reflux disease) 11/3/2011    History of AAA (abdominal aortic aneurysm) repair ???    including right femoral artery    Hypercholesterolemia     Hyperlipidemia 11/3/2011    Hypertension 11/3/2011    Popliteal artery aneurysm, bilateral ([facility]) 9/23/2011    L Pop A aneurysm stent graft 9/8/11- Dr. Bui R Pop A aneurysm repair      Psychiatric disorder     anxiety    PUD (peptic ulcer disease) ???    hx

## 2025-01-02 RX ORDER — ROPINIROLE 0.25 MG/1
0.25 TABLET, FILM COATED ORAL NIGHTLY
Qty: 90 TABLET | Refills: 1 | OUTPATIENT
Start: 2025-01-02

## 2025-01-20 ENCOUNTER — TELEPHONE (OUTPATIENT)
Dept: PRIMARY CARE CLINIC | Facility: CLINIC | Age: 70
End: 2025-01-20

## 2025-01-20 NOTE — TELEPHONE ENCOUNTER
Medication refill request  Medication: Sertraline 50mg   Last OV/VV: 12/11/2024  Pharmacy: [facility]

## 2025-03-06 ENCOUNTER — TELEPHONE (OUTPATIENT)
Dept: PRIMARY CARE CLINIC | Facility: CLINIC | Age: 70
End: 2025-03-06

## 2025-03-06 NOTE — TELEPHONE ENCOUNTER
LVM requesting Pt all office if he and spouse available to do AWV VV any day wk 3/10/25-3/15/25.

## 2025-03-18 ENCOUNTER — TELEPHONE (OUTPATIENT)
Dept: PRIMARY CARE CLINIC | Facility: CLINIC | Age: 70
End: 2025-03-18

## 2025-03-18 DIAGNOSIS — G25.81 RLS (RESTLESS LEGS SYNDROME): ICD-10-CM

## 2025-03-18 RX ORDER — ROPINIROLE 1 MG/1
1 TABLET, FILM COATED ORAL 3 TIMES DAILY
Qty: 90 TABLET | Refills: 0 | OUTPATIENT
Start: 2025-03-18

## 2025-03-18 NOTE — TELEPHONE ENCOUNTER
Patient's wife stated that the patient is no longer a pt here but she will like to request one last refill for the following rOPINIRole

## 2025-03-18 NOTE — TELEPHONE ENCOUNTER
Spouse notified Pt should have a 90-day refill at [facility] pharmacy  12/11/24 qty: 90 with 1 RF  Spouse, Constanza voiced understanding

## 2025-03-22 DIAGNOSIS — G25.81 RLS (RESTLESS LEGS SYNDROME): ICD-10-CM

## 2025-03-24 RX ORDER — ROPINIROLE 1 MG/1
1 TABLET, FILM COATED ORAL 3 TIMES DAILY
Qty: 90 TABLET | Refills: 0 | OUTPATIENT
Start: 2025-03-24

## 2025-03-25 DIAGNOSIS — G25.81 RLS (RESTLESS LEGS SYNDROME): ICD-10-CM

## 2025-03-25 RX ORDER — ROPINIROLE 1 MG/1
1 TABLET, FILM COATED ORAL 3 TIMES DAILY
Qty: 90 TABLET | Refills: 0 | OUTPATIENT
Start: 2025-03-25

## 2025-03-26 ENCOUNTER — TELEPHONE (OUTPATIENT)
Dept: INTERNAL MEDICINE CLINIC | Facility: CLINIC | Age: 70
End: 2025-03-26

## 2025-03-26 NOTE — TELEPHONE ENCOUNTER
Medication refill request  Medication: rOPINIRole (REQUIP) 1 MG tablet   90 TABLET QUANTITY W/ 1 REFILL    Last OV/VV: 12/11/2024    Pharmacy: [address] [place] [phone] - F [phone]     Pt is scheduled for a NP appt with a different provider on 4/17

## 2025-03-31 ENCOUNTER — TELEMEDICINE (OUTPATIENT)
Dept: PRIMARY CARE CLINIC | Facility: CLINIC | Age: 70
End: 2025-03-31
Payer: MEDICARE

## 2025-03-31 DIAGNOSIS — K21.9 GASTROESOPHAGEAL REFLUX DISEASE WITHOUT ESOPHAGITIS: ICD-10-CM

## 2025-03-31 DIAGNOSIS — Z00.00 MEDICARE ANNUAL WELLNESS VISIT, SUBSEQUENT: Primary | ICD-10-CM

## 2025-03-31 DIAGNOSIS — G25.81 RLS (RESTLESS LEGS SYNDROME): ICD-10-CM

## 2025-03-31 DIAGNOSIS — K76.0 FATTY LIVER: ICD-10-CM

## 2025-03-31 DIAGNOSIS — E78.00 PURE HYPERCHOLESTEROLEMIA: ICD-10-CM

## 2025-03-31 DIAGNOSIS — E11.65 TYPE 2 DIABETES MELLITUS WITH HYPERGLYCEMIA, WITHOUT LONG-TERM CURRENT USE OF INSULIN (HCC): ICD-10-CM

## 2025-03-31 DIAGNOSIS — I20.0 UNSTABLE ANGINA PECTORIS (HCC): ICD-10-CM

## 2025-03-31 PROCEDURE — 2022F DILAT RTA XM EVC RTNOPTHY: CPT | Performed by: INTERNAL MEDICINE

## 2025-03-31 PROCEDURE — 1123F ACP DISCUSS/DSCN MKR DOCD: CPT | Performed by: INTERNAL MEDICINE

## 2025-03-31 PROCEDURE — 99214 OFFICE O/P EST MOD 30 MIN: CPT | Performed by: INTERNAL MEDICINE

## 2025-03-31 PROCEDURE — G8417 CALC BMI ABV UP PARAM F/U: HCPCS | Performed by: INTERNAL MEDICINE

## 2025-03-31 PROCEDURE — 1036F TOBACCO NON-USER: CPT | Performed by: INTERNAL MEDICINE

## 2025-03-31 PROCEDURE — G2211 COMPLEX E/M VISIT ADD ON: HCPCS | Performed by: INTERNAL MEDICINE

## 2025-03-31 PROCEDURE — 3017F COLORECTAL CA SCREEN DOC REV: CPT | Performed by: INTERNAL MEDICINE

## 2025-03-31 PROCEDURE — 3046F HEMOGLOBIN A1C LEVEL >9.0%: CPT | Performed by: INTERNAL MEDICINE

## 2025-03-31 PROCEDURE — G8427 DOCREV CUR MEDS BY ELIG CLIN: HCPCS | Performed by: INTERNAL MEDICINE

## 2025-03-31 PROCEDURE — 1159F MED LIST DOCD IN RCRD: CPT | Performed by: INTERNAL MEDICINE

## 2025-03-31 PROCEDURE — G0439 PPPS, SUBSEQ VISIT: HCPCS | Performed by: INTERNAL MEDICINE

## 2025-03-31 RX ORDER — ROPINIROLE 1 MG/1
1 TABLET, FILM COATED ORAL 3 TIMES DAILY
Qty: 270 TABLET | Refills: 0 | Status: SHIPPED | OUTPATIENT
Start: 2025-03-31

## 2025-03-31 SDOH — ECONOMIC STABILITY: FOOD INSECURITY: WITHIN THE PAST 12 MONTHS, YOU WORRIED THAT YOUR FOOD WOULD RUN OUT BEFORE YOU GOT MONEY TO BUY MORE.: PATIENT DECLINED

## 2025-03-31 SDOH — ECONOMIC STABILITY: FOOD INSECURITY: WITHIN THE PAST 12 MONTHS, THE FOOD YOU BOUGHT JUST DIDN'T LAST AND YOU DIDN'T HAVE MONEY TO GET MORE.: PATIENT DECLINED

## 2025-03-31 ASSESSMENT — PATIENT HEALTH QUESTIONNAIRE - PHQ9
SUM OF ALL RESPONSES TO PHQ QUESTIONS 1-9: 2
SUM OF ALL RESPONSES TO PHQ QUESTIONS 1-9: 2
2. FEELING DOWN, DEPRESSED OR HOPELESS: MORE THAN HALF THE DAYS
SUM OF ALL RESPONSES TO PHQ QUESTIONS 1-9: 2
1. LITTLE INTEREST OR PLEASURE IN DOING THINGS: NOT AT ALL
SUM OF ALL RESPONSES TO PHQ QUESTIONS 1-9: 2

## 2025-03-31 ASSESSMENT — LIFESTYLE VARIABLES
HOW MANY STANDARD DRINKS CONTAINING ALCOHOL DO YOU HAVE ON A TYPICAL DAY: PATIENT DOES NOT DRINK
HOW OFTEN DO YOU HAVE A DRINK CONTAINING ALCOHOL: NEVER

## 2025-03-31 NOTE — PROGRESS NOTES
FOLLOW UP VISIT    Subjective:    Mihai Ledesma (: 1955) is a 69 y.o., male,   Chief Complaint   Patient presents with    Medicare AWV    Medication Refill     3 month supply until new PCP        HPI:  HPI  History of Present Illness  The patient is a 69-year-old male who presents for follow-up. He has a cardiologist and a vascular doctor. He has a history of an ICD, angina, chronic systolic heart failure, peripheral vascular disease, diabetes, high cholesterol, reflux, restless leg syndrome. He is in for a refill of his medications. He is looking for a new PCP and is requesting a 3-month supply of medications until he finds one. He is due for labs.    He is seeking a refill of his current medications and is in the process of establishing care with a new primary care physician. He is requesting a 3-month supply of his medications to bridge the gap until he secures a new PCP.       The following portions of the patient's history were reviewed and updated as appropriate:      Past Medical History:   Diagnosis Date    AAA (abdominal aortic aneurysm) 2011 AORTIC ABDOMINAL ANUERYSM REPAIR ENDOVASCULAR (EVAR)-  / Hao 4.6cm on US 11     Acute myocardial infarction (HCC)     MI 2/10 - Quad bypass 2/25/10 ,2016    Anticoagulation monitoring, INR range 2-3 10/5/2011    Anxiety 11/3/2011    Arthritis 11/3/2011    CAD (coronary artery disease)     CAD (coronary artery disease), native coronary artery 2010    Cardiomyopathy ([facility]) 2010    Chest discomfort 06/16/15    Tightness, Pressure.  2014:  stress MPI with Lexiscan - normal perfusion, LVEF 52%    Chronic systolic heart failure ([facility]) 2010    Claudication 06/16/15    Claudication, symptom, pain relieved by rest    Congestive heart failure, unspecified     COPD (chronic obstructive pulmonary disease) ([facility]) 11/3/2011    Extremity atherosclerosis with resting pain ([facility]) 2011 ;

## 2025-03-31 NOTE — PATIENT INSTRUCTIONS
counselor or psychiatrist can help you cope with issues such as depression, anxiety, or family problems that can make it hard to focus on weight loss.  Consider joining a support group for people who are trying to lose weight. Your doctor can suggest groups in your area.  Where can you learn more?  Go to https://www.[employer].net/patientEd and enter U357 to learn more about \"Starting a Weight-Loss Plan: Care Instructions.\"  Current as of: April 30, 2024  Content Version: 14.4  © 7625-7465 [employer].   Care instructions adapted under license by [employer]. If you have questions about a medical condition or this instruction, always ask your healthcare professional. [employer] disclaims any warranty or liability for your use of this information.         Learning About Lung Cancer Screening  What is screening for lung cancer?     Lung cancer screening is a way to find some lung cancers early, before a person has any symptoms of the cancer.  Lung cancer screening may help those who have the highest risk for lung cancer--people age 50 and older who are or were heavy smokers. For most people, who aren't at increased risk, screening for lung cancer probably isn't helpful.  Screening won't prevent cancer. And it may not find all lung cancers. Lung cancer screening may lower the risk of dying from lung cancer in a small number of people.  How is it done?  Lung cancer screening is done with a low-dose CT (computed tomography) scan. A CT scan uses X-rays, or radiation, to make detailed pictures of your body. Experts recommend that screening be done in medical centers that focus on finding and treating lung cancer.  Who is screening recommended for?  Lung cancer screening is recommended for people age 50 and older who are or were heavy smokers. That means people with a smoking history of at least 20 pack years. A pack year is a way to measure how heavy a smoker you are or were.  To figure out your

## 2025-05-07 ENCOUNTER — HOSPITAL ENCOUNTER (INPATIENT)
Age: 70
LOS: 2 days | Discharge: HOME | End: 2025-05-09
Attending: EMERGENCY MEDICINE | Admitting: INTERNAL MEDICINE
Payer: MEDICARE

## 2025-05-07 ENCOUNTER — APPOINTMENT (OUTPATIENT)
Dept: GENERAL RADIOLOGY | Age: 70
End: 2025-05-07
Payer: MEDICARE

## 2025-05-07 ENCOUNTER — APPOINTMENT (OUTPATIENT)
Dept: NON INVASIVE DIAGNOSTICS | Age: 70
End: 2025-05-07
Payer: MEDICARE

## 2025-05-07 DIAGNOSIS — E11.65 TYPE 2 DIABETES MELLITUS WITH HYPERGLYCEMIA, WITHOUT LONG-TERM CURRENT USE OF INSULIN (HCC): ICD-10-CM

## 2025-05-07 DIAGNOSIS — R07.9 CHEST PAIN: ICD-10-CM

## 2025-05-07 DIAGNOSIS — I20.0 UNSTABLE ANGINA (HCC): Primary | ICD-10-CM

## 2025-05-07 DIAGNOSIS — R07.9 CHEST PAIN, UNSPECIFIED TYPE: ICD-10-CM

## 2025-05-07 LAB
ALBUMIN SERPL-MCNC: 3.6 G/DL (ref 3.2–4.6)
ALBUMIN/GLOB SERPL: 1 (ref 1–1.9)
ALP SERPL-CCNC: 65 U/L (ref 40–129)
ALT SERPL-CCNC: 15 U/L (ref 8–55)
ANION GAP SERPL CALC-SCNC: 9 MMOL/L (ref 7–16)
AST SERPL-CCNC: 20 U/L (ref 15–37)
BASOPHILS # BLD: 0.1 K/UL (ref 0–0.2)
BASOPHILS NFR BLD: 1.1 % (ref 0–2)
BILIRUB SERPL-MCNC: 0.3 MG/DL (ref 0–1.2)
BUN SERPL-MCNC: 23 MG/DL (ref 8–23)
CALCIUM SERPL-MCNC: 9.9 MG/DL (ref 8.8–10.2)
CHLORIDE SERPL-SCNC: 109 MMOL/L (ref 98–107)
CO2 SERPL-SCNC: 20 MMOL/L (ref 20–29)
CREAT SERPL-MCNC: 1.17 MG/DL (ref 0.8–1.3)
DIFFERENTIAL METHOD BLD: ABNORMAL
ECHO AO ASC DIAM: 4.2 CM
ECHO AO ASCENDING AORTA INDEX: 1.81 CM/M2
ECHO AO ROOT DIAM: 3.6 CM
ECHO AO ROOT INDEX: 1.55 CM/M2
ECHO AV AREA PEAK VELOCITY: 2.7 CM2
ECHO AV AREA VTI: 3 CM2
ECHO AV AREA/BSA PEAK VELOCITY: 1.2 CM2/M2
ECHO AV AREA/BSA VTI: 1.3 CM2/M2
ECHO AV MEAN GRADIENT: 4 MMHG
ECHO AV MEAN GRADIENT: 4 MMHG
ECHO AV MEAN VELOCITY: 0.9 M/S
ECHO AV PEAK GRADIENT: 7 MMHG
ECHO AV PEAK VELOCITY: 1.3 M/S
ECHO AV VELOCITY RATIO: 0.77
ECHO AV VTI: 28.1 CM
ECHO BSA: 2.38 M2
ECHO EST RA PRESSURE: 3 MMHG
ECHO IVC PROX: 1.6 CM
ECHO LA AREA 2C: 22.1 CM2
ECHO LA AREA 4C: 21.7 CM2
ECHO LA DIAMETER INDEX: 1.72 CM/M2
ECHO LA DIAMETER: 4 CM
ECHO LA MAJOR AXIS: 6.7 CM
ECHO LA MINOR AXIS: 6.7 CM
ECHO LA TO AORTIC ROOT RATIO: 1.11
ECHO LA VOL BP: 60 ML (ref 18–58)
ECHO LA VOL MOD A2C: 61 ML (ref 18–58)
ECHO LA VOL MOD A4C: 59 ML (ref 18–58)
ECHO LA VOL/BSA BIPLANE: 26 ML/M2 (ref 16–34)
ECHO LA VOLUME INDEX MOD A2C: 26 ML/M2 (ref 16–34)
ECHO LA VOLUME INDEX MOD A4C: 25 ML/M2 (ref 16–34)
ECHO LV E' LATERAL VELOCITY: 10.7 CM/S
ECHO LV E' SEPTAL VELOCITY: 6.64 CM/S
ECHO LV EDV A2C: 199 ML
ECHO LV EDV A4C: 190 ML
ECHO LV EDV INDEX A4C: 82 ML/M2
ECHO LV EDV NDEX A2C: 86 ML/M2
ECHO LV EF PHYSICIAN: 45 %
ECHO LV EJECTION FRACTION A2C: 47 %
ECHO LV EJECTION FRACTION A4C: 51 %
ECHO LV EJECTION FRACTION BIPLANE: 48 % (ref 55–100)
ECHO LV ESV A2C: 105 ML
ECHO LV ESV A4C: 94 ML
ECHO LV ESV INDEX A2C: 45 ML/M2
ECHO LV ESV INDEX A4C: 41 ML/M2
ECHO LV FRACTIONAL SHORTENING: 25 % (ref 28–44)
ECHO LV INTERNAL DIMENSION DIASTOLE INDEX: 2.37 CM/M2
ECHO LV INTERNAL DIMENSION DIASTOLIC: 5.5 CM (ref 4.2–5.9)
ECHO LV INTERNAL DIMENSION SYSTOLIC INDEX: 1.77 CM/M2
ECHO LV INTERNAL DIMENSION SYSTOLIC: 4.1 CM
ECHO LV IVSD: 1.2 CM (ref 0.6–1)
ECHO LV MASS 2D: 257 G (ref 88–224)
ECHO LV MASS INDEX 2D: 110.8 G/M2 (ref 49–115)
ECHO LV POSTERIOR WALL DIASTOLIC: 1.1 CM (ref 0.6–1)
ECHO LV RELATIVE WALL THICKNESS RATIO: 0.4
ECHO LVOT AREA: 3.5 CM2
ECHO LVOT AV VTI INDEX: 0.88
ECHO LVOT DIAM: 2.1 CM
ECHO LVOT MEAN GRADIENT: 2 MMHG
ECHO LVOT MEAN GRADIENT: 2 MMHG
ECHO LVOT PEAK GRADIENT: 4 MMHG
ECHO LVOT PEAK VELOCITY: 1 M/S
ECHO LVOT STROKE VOLUME INDEX: 36.9 ML/M2
ECHO LVOT SV: 85.5 ML
ECHO LVOT VTI: 24.7 CM
ECHO MV A VELOCITY: 1.03 M/S
ECHO MV AREA VTI: 1.9 CM2
ECHO MV E DECELERATION TIME (DT): 237 MS
ECHO MV E VELOCITY: 1 M/S
ECHO MV E/A RATIO: 0.97
ECHO MV E/E' LATERAL: 9.35
ECHO MV E/E' RATIO (AVERAGED): 12.2
ECHO MV E/E' SEPTAL: 15.06
ECHO MV LVOT VTI INDEX: 1.82
ECHO MV MAX VELOCITY: 1.1 M/S
ECHO MV MEAN GRADIENT: 2 MMHG
ECHO MV MEAN VELOCITY: 0.6 M/S
ECHO MV PEAK GRADIENT: 5 MMHG
ECHO MV VTI: 45 CM
ECHO PV ACCELERATION TIME (AT): 123 MS
ECHO PV MAX VELOCITY: 1 M/S
ECHO PV PEAK GRADIENT: 4 MMHG
ECHO PV PEAK GRADIENT: 4 MMHG
ECHO RV FREE WALL PEAK S': 6.7 CM/S
ECHO RV INTERNAL DIMENSION: 3 CM
EKG ATRIAL RATE: 68 BPM
EKG DIAGNOSIS: NORMAL
EKG P AXIS: 62 DEGREES
EKG P-R INTERVAL: 190 MS
EKG Q-T INTERVAL: 450 MS
EKG QRS DURATION: 114 MS
EKG QTC CALCULATION (BAZETT): 478 MS
EKG R AXIS: 65 DEGREES
EKG T AXIS: 269 DEGREES
EKG VENTRICULAR RATE: 68 BPM
EOSINOPHIL # BLD: 0.49 K/UL (ref 0–0.8)
EOSINOPHIL NFR BLD: 5.2 % (ref 0.5–7.8)
ERYTHROCYTE [DISTWIDTH] IN BLOOD BY AUTOMATED COUNT: 13 % (ref 11.9–14.6)
GLOBULIN SER CALC-MCNC: 3.5 G/DL (ref 2.3–3.5)
GLUCOSE BLD STRIP.AUTO-MCNC: 160 MG/DL (ref 65–100)
GLUCOSE BLD STRIP.AUTO-MCNC: 214 MG/DL (ref 65–100)
GLUCOSE SERPL-MCNC: 161 MG/DL (ref 70–99)
HCT VFR BLD AUTO: 46.3 % (ref 41.1–50.3)
HGB BLD-MCNC: 14.9 G/DL (ref 13.6–17.2)
IMM GRANULOCYTES # BLD AUTO: 0.05 K/UL (ref 0–0.5)
IMM GRANULOCYTES NFR BLD AUTO: 0.5 % (ref 0–5)
LYMPHOCYTES # BLD: 2.53 K/UL (ref 0.5–4.6)
LYMPHOCYTES NFR BLD: 26.7 % (ref 13–44)
MCH RBC QN AUTO: 28.7 PG (ref 26.1–32.9)
MCHC RBC AUTO-ENTMCNC: 32.2 G/DL (ref 31.4–35)
MCV RBC AUTO: 89 FL (ref 82–102)
MONOCYTES # BLD: 0.65 K/UL (ref 0.1–1.3)
MONOCYTES NFR BLD: 6.9 % (ref 4–12)
NEUTS SEG # BLD: 5.65 K/UL (ref 1.7–8.2)
NEUTS SEG NFR BLD: 59.6 % (ref 43–78)
NRBC # BLD: 0 K/UL (ref 0–0.2)
NT PRO BNP: 135 PG/ML (ref 0–125)
PLATELET # BLD AUTO: 230 K/UL (ref 150–450)
PMV BLD AUTO: 9.3 FL (ref 9.4–12.3)
POTASSIUM SERPL-SCNC: 4 MMOL/L (ref 3.5–5.1)
PROT SERPL-MCNC: 7.1 G/DL (ref 6.3–8.2)
RBC # BLD AUTO: 5.2 M/UL (ref 4.23–5.6)
SERVICE CMNT-IMP: ABNORMAL
SERVICE CMNT-IMP: ABNORMAL
SODIUM SERPL-SCNC: 138 MMOL/L (ref 136–145)
TROPONIN T SERPL HS-MCNC: 33.3 NG/L (ref 0–22)
TROPONIN T SERPL HS-MCNC: 33.4 NG/L (ref 0–22)
TROPONIN T SERPL HS-MCNC: 37.1 NG/L (ref 0–22)
WBC # BLD AUTO: 9.5 K/UL (ref 4.3–11.1)

## 2025-05-07 PROCEDURE — 6360000004 HC RX CONTRAST MEDICATION: Performed by: PHYSICIAN ASSISTANT

## 2025-05-07 PROCEDURE — 6360000002 HC RX W HCPCS

## 2025-05-07 PROCEDURE — C8929 TTE W OR WO FOL WCON,DOPPLER: HCPCS

## 2025-05-07 PROCEDURE — 36415 COLL VENOUS BLD VENIPUNCTURE: CPT

## 2025-05-07 PROCEDURE — 2500000003 HC RX 250 WO HCPCS: Performed by: NURSE PRACTITIONER

## 2025-05-07 PROCEDURE — 6370000000 HC RX 637 (ALT 250 FOR IP)

## 2025-05-07 PROCEDURE — 80053 COMPREHEN METABOLIC PANEL: CPT

## 2025-05-07 PROCEDURE — 93005 ELECTROCARDIOGRAM TRACING: CPT | Performed by: PHYSICIAN ASSISTANT

## 2025-05-07 PROCEDURE — 6370000000 HC RX 637 (ALT 250 FOR IP): Performed by: PHYSICIAN ASSISTANT

## 2025-05-07 PROCEDURE — 96374 THER/PROPH/DIAG INJ IV PUSH: CPT

## 2025-05-07 PROCEDURE — 85025 COMPLETE CBC W/AUTO DIFF WBC: CPT

## 2025-05-07 PROCEDURE — 2500000003 HC RX 250 WO HCPCS: Performed by: PHYSICIAN ASSISTANT

## 2025-05-07 PROCEDURE — 83880 ASSAY OF NATRIURETIC PEPTIDE: CPT

## 2025-05-07 PROCEDURE — 84484 ASSAY OF TROPONIN QUANT: CPT

## 2025-05-07 PROCEDURE — 93306 TTE W/DOPPLER COMPLETE: CPT | Performed by: INTERNAL MEDICINE

## 2025-05-07 PROCEDURE — 93010 ELECTROCARDIOGRAM REPORT: CPT | Performed by: INTERNAL MEDICINE

## 2025-05-07 PROCEDURE — 94762 N-INVAS EAR/PLS OXIMTRY CONT: CPT

## 2025-05-07 PROCEDURE — 99285 EMERGENCY DEPT VISIT HI MDM: CPT

## 2025-05-07 PROCEDURE — 99223 1ST HOSP IP/OBS HIGH 75: CPT | Performed by: INTERNAL MEDICINE

## 2025-05-07 PROCEDURE — 71046 X-RAY EXAM CHEST 2 VIEWS: CPT

## 2025-05-07 PROCEDURE — 82962 GLUCOSE BLOOD TEST: CPT

## 2025-05-07 PROCEDURE — 93005 ELECTROCARDIOGRAM TRACING: CPT

## 2025-05-07 PROCEDURE — 2140000000 HC CCU INTERMEDIATE R&B

## 2025-05-07 RX ORDER — ASPIRIN 81 MG/1
81 TABLET ORAL DAILY
Status: DISCONTINUED | OUTPATIENT
Start: 2025-05-08 | End: 2025-05-08 | Stop reason: SDUPTHER

## 2025-05-07 RX ORDER — INSULIN LISPRO 100 [IU]/ML
0-4 INJECTION, SOLUTION INTRAVENOUS; SUBCUTANEOUS
Status: DISCONTINUED | OUTPATIENT
Start: 2025-05-07 | End: 2025-05-09 | Stop reason: HOSPADM

## 2025-05-07 RX ORDER — SODIUM CHLORIDE 9 MG/ML
INJECTION, SOLUTION INTRAVENOUS CONTINUOUS
Status: DISCONTINUED | OUTPATIENT
Start: 2025-05-08 | End: 2025-05-08 | Stop reason: SDUPTHER

## 2025-05-07 RX ORDER — SODIUM CHLORIDE 0.9 % (FLUSH) 0.9 %
5-40 SYRINGE (ML) INJECTION PRN
Status: DISCONTINUED | OUTPATIENT
Start: 2025-05-07 | End: 2025-05-09 | Stop reason: HOSPADM

## 2025-05-07 RX ORDER — SERTRALINE HYDROCHLORIDE 25 MG/1
50 TABLET, FILM COATED ORAL DAILY
Status: DISCONTINUED | OUTPATIENT
Start: 2025-05-07 | End: 2025-05-09 | Stop reason: HOSPADM

## 2025-05-07 RX ORDER — HYDROCODONE BITARTRATE AND ACETAMINOPHEN 10; 325 MG/1; MG/1
1 TABLET ORAL EVERY 4 HOURS PRN
Status: DISCONTINUED | OUTPATIENT
Start: 2025-05-07 | End: 2025-05-09 | Stop reason: HOSPADM

## 2025-05-07 RX ORDER — DIPHENHYDRAMINE HCL 25 MG
25 CAPSULE ORAL EVERY 6 HOURS PRN
Status: DISCONTINUED | OUTPATIENT
Start: 2025-05-07 | End: 2025-05-09 | Stop reason: HOSPADM

## 2025-05-07 RX ORDER — EZETIMIBE 10 MG/1
10 TABLET ORAL DAILY
Status: DISCONTINUED | OUTPATIENT
Start: 2025-05-07 | End: 2025-05-09 | Stop reason: HOSPADM

## 2025-05-07 RX ORDER — CLOPIDOGREL BISULFATE 75 MG/1
75 TABLET ORAL DAILY
Status: DISCONTINUED | OUTPATIENT
Start: 2025-05-07 | End: 2025-05-08 | Stop reason: SDUPTHER

## 2025-05-07 RX ORDER — CARVEDILOL 6.25 MG/1
6.25 TABLET ORAL DAILY
Status: DISCONTINUED | OUTPATIENT
Start: 2025-05-08 | End: 2025-05-09

## 2025-05-07 RX ORDER — DIAZEPAM 5 MG/1
5 TABLET ORAL EVERY 6 HOURS PRN
Status: DISCONTINUED | OUTPATIENT
Start: 2025-05-07 | End: 2025-05-09 | Stop reason: HOSPADM

## 2025-05-07 RX ORDER — NITROGLYCERIN 0.4 MG/1
0.4 TABLET SUBLINGUAL EVERY 5 MIN PRN
Status: DISCONTINUED | OUTPATIENT
Start: 2025-05-07 | End: 2025-05-09 | Stop reason: HOSPADM

## 2025-05-07 RX ORDER — ONDANSETRON 4 MG/1
4 TABLET, ORALLY DISINTEGRATING ORAL EVERY 8 HOURS PRN
Status: DISCONTINUED | OUTPATIENT
Start: 2025-05-07 | End: 2025-05-09 | Stop reason: HOSPADM

## 2025-05-07 RX ORDER — ASPIRIN 81 MG/1
324 TABLET, CHEWABLE ORAL ONCE
Status: COMPLETED | OUTPATIENT
Start: 2025-05-07 | End: 2025-05-07

## 2025-05-07 RX ORDER — MAGNESIUM SULFATE IN WATER 40 MG/ML
2000 INJECTION, SOLUTION INTRAVENOUS PRN
Status: DISCONTINUED | OUTPATIENT
Start: 2025-05-07 | End: 2025-05-09 | Stop reason: HOSPADM

## 2025-05-07 RX ORDER — IBUPROFEN 600 MG/1
1 TABLET ORAL PRN
Status: DISCONTINUED | OUTPATIENT
Start: 2025-05-07 | End: 2025-05-09 | Stop reason: HOSPADM

## 2025-05-07 RX ORDER — ACETAMINOPHEN 650 MG/1
650 SUPPOSITORY RECTAL EVERY 6 HOURS PRN
Status: DISCONTINUED | OUTPATIENT
Start: 2025-05-07 | End: 2025-05-09 | Stop reason: HOSPADM

## 2025-05-07 RX ORDER — ONDANSETRON 2 MG/ML
4 INJECTION INTRAMUSCULAR; INTRAVENOUS EVERY 6 HOURS PRN
Status: DISCONTINUED | OUTPATIENT
Start: 2025-05-07 | End: 2025-05-09 | Stop reason: HOSPADM

## 2025-05-07 RX ORDER — ROSUVASTATIN CALCIUM 20 MG/1
40 TABLET, COATED ORAL NIGHTLY
Status: DISCONTINUED | OUTPATIENT
Start: 2025-05-07 | End: 2025-05-09 | Stop reason: HOSPADM

## 2025-05-07 RX ORDER — SODIUM CHLORIDE 0.9 % (FLUSH) 0.9 %
5-40 SYRINGE (ML) INJECTION EVERY 12 HOURS SCHEDULED
Status: DISCONTINUED | OUTPATIENT
Start: 2025-05-07 | End: 2025-05-09 | Stop reason: HOSPADM

## 2025-05-07 RX ORDER — PANTOPRAZOLE SODIUM 40 MG/1
40 TABLET, DELAYED RELEASE ORAL DAILY
Status: DISCONTINUED | OUTPATIENT
Start: 2025-05-07 | End: 2025-05-09 | Stop reason: HOSPADM

## 2025-05-07 RX ORDER — ACETAMINOPHEN 325 MG/1
650 TABLET ORAL EVERY 6 HOURS PRN
Status: DISCONTINUED | OUTPATIENT
Start: 2025-05-07 | End: 2025-05-08 | Stop reason: SDUPTHER

## 2025-05-07 RX ORDER — ROPINIROLE 1 MG/1
1 TABLET, FILM COATED ORAL 3 TIMES DAILY
Status: DISCONTINUED | OUTPATIENT
Start: 2025-05-07 | End: 2025-05-09 | Stop reason: HOSPADM

## 2025-05-07 RX ORDER — DEXTROSE MONOHYDRATE 100 MG/ML
INJECTION, SOLUTION INTRAVENOUS CONTINUOUS PRN
Status: DISCONTINUED | OUTPATIENT
Start: 2025-05-07 | End: 2025-05-09 | Stop reason: HOSPADM

## 2025-05-07 RX ORDER — FENOFIBRATE 160 MG/1
160 TABLET ORAL DAILY
Status: DISCONTINUED | OUTPATIENT
Start: 2025-05-07 | End: 2025-05-09 | Stop reason: HOSPADM

## 2025-05-07 RX ORDER — MORPHINE SULFATE 4 MG/ML
4 INJECTION, SOLUTION INTRAMUSCULAR; INTRAVENOUS
Refills: 0 | Status: COMPLETED | OUTPATIENT
Start: 2025-05-07 | End: 2025-05-07

## 2025-05-07 RX ORDER — SODIUM CHLORIDE 9 MG/ML
INJECTION, SOLUTION INTRAVENOUS PRN
Status: DISCONTINUED | OUTPATIENT
Start: 2025-05-07 | End: 2025-05-09 | Stop reason: HOSPADM

## 2025-05-07 RX ORDER — AMIODARONE HYDROCHLORIDE 200 MG/1
200 TABLET ORAL DAILY
Status: DISCONTINUED | OUTPATIENT
Start: 2025-05-07 | End: 2025-05-09 | Stop reason: HOSPADM

## 2025-05-07 RX ORDER — LISINOPRIL 20 MG/1
20 TABLET ORAL DAILY
Status: DISCONTINUED | OUTPATIENT
Start: 2025-05-08 | End: 2025-05-09 | Stop reason: HOSPADM

## 2025-05-07 RX ORDER — POLYETHYLENE GLYCOL 3350 17 G/17G
17 POWDER, FOR SOLUTION ORAL DAILY PRN
Status: DISCONTINUED | OUTPATIENT
Start: 2025-05-07 | End: 2025-05-09 | Stop reason: HOSPADM

## 2025-05-07 RX ORDER — MORPHINE SULFATE 2 MG/ML
2 INJECTION, SOLUTION INTRAMUSCULAR; INTRAVENOUS EVERY 6 HOURS PRN
Status: DISCONTINUED | OUTPATIENT
Start: 2025-05-07 | End: 2025-05-09 | Stop reason: HOSPADM

## 2025-05-07 RX ADMIN — NITROGLYCERIN 0.4 MG: 0.4 TABLET SUBLINGUAL at 17:52

## 2025-05-07 RX ADMIN — ASPIRIN 324 MG: 81 TABLET, CHEWABLE ORAL at 13:31

## 2025-05-07 RX ADMIN — MORPHINE SULFATE 2 MG: 2 INJECTION, SOLUTION INTRAMUSCULAR; INTRAVENOUS at 18:30

## 2025-05-07 RX ADMIN — ROSUVASTATIN CALCIUM 40 MG: 20 TABLET, FILM COATED ORAL at 20:11

## 2025-05-07 RX ADMIN — MORPHINE SULFATE 4 MG: 4 INJECTION INTRAVENOUS at 13:34

## 2025-05-07 RX ADMIN — SULFUR HEXAFLUORIDE 2 ML: KIT at 15:52

## 2025-05-07 RX ADMIN — SODIUM CHLORIDE, PRESERVATIVE FREE 10 ML: 5 INJECTION INTRAVENOUS at 20:11

## 2025-05-07 RX ADMIN — PANTOPRAZOLE SODIUM 40 MG: 40 TABLET, DELAYED RELEASE ORAL at 18:30

## 2025-05-07 RX ADMIN — INSULIN LISPRO 1 UNITS: 100 INJECTION, SOLUTION INTRAVENOUS; SUBCUTANEOUS at 20:11

## 2025-05-07 RX ADMIN — ROPINIROLE HYDROCHLORIDE 1 MG: 1 TABLET, FILM COATED ORAL at 20:39

## 2025-05-07 ASSESSMENT — PAIN SCALES - GENERAL
PAINLEVEL_OUTOF10: 6
PAINLEVEL_OUTOF10: 5
PAINLEVEL_OUTOF10: 6
PAINLEVEL_OUTOF10: 6
PAINLEVEL_OUTOF10: 9
PAINLEVEL_OUTOF10: 6
PAINLEVEL_OUTOF10: 5
PAINLEVEL_OUTOF10: 6
PAINLEVEL_OUTOF10: 9

## 2025-05-07 ASSESSMENT — PAIN DESCRIPTION - FREQUENCY
FREQUENCY: INTERMITTENT
FREQUENCY: INTERMITTENT

## 2025-05-07 ASSESSMENT — PAIN DESCRIPTION - ORIENTATION
ORIENTATION: LEFT
ORIENTATION: MID

## 2025-05-07 ASSESSMENT — PAIN DESCRIPTION - PAIN TYPE
TYPE: ACUTE PAIN
TYPE: ACUTE PAIN

## 2025-05-07 ASSESSMENT — PAIN DESCRIPTION - LOCATION
LOCATION: CHEST

## 2025-05-07 ASSESSMENT — PAIN DESCRIPTION - DESCRIPTORS
DESCRIPTORS: ACHING
DESCRIPTORS: PRESSURE
DESCRIPTORS: PRESSURE;HEAVINESS
DESCRIPTORS: PRESSURE
DESCRIPTORS: HEAVINESS
DESCRIPTORS: PRESSURE

## 2025-05-07 ASSESSMENT — PAIN - FUNCTIONAL ASSESSMENT
PAIN_FUNCTIONAL_ASSESSMENT: ACTIVITIES ARE NOT PREVENTED
PAIN_FUNCTIONAL_ASSESSMENT: ACTIVITIES ARE NOT PREVENTED

## 2025-05-07 ASSESSMENT — LIFESTYLE VARIABLES
HOW OFTEN DO YOU HAVE A DRINK CONTAINING ALCOHOL: NEVER
HOW MANY STANDARD DRINKS CONTAINING ALCOHOL DO YOU HAVE ON A TYPICAL DAY: PATIENT DOES NOT DRINK

## 2025-05-07 ASSESSMENT — ENCOUNTER SYMPTOMS
EYES NEGATIVE: 1
ABDOMINAL DISTENTION: 0
ALLERGIC/IMMUNOLOGIC NEGATIVE: 1
SHORTNESS OF BREATH: 1
GASTROINTESTINAL NEGATIVE: 1

## 2025-05-07 ASSESSMENT — PAIN DESCRIPTION - ONSET
ONSET: GRADUAL
ONSET: GRADUAL

## 2025-05-07 NOTE — ED TRIAGE NOTES
Pt ambulatory to triage c/o chest pain and SHOB that started last night.     Hx pacemaker/defibrillator. Patient has extensive cardiac history. Hx 6-7 stents. CABG 2016.    Took norco 10 and ASA 81 mg this morning.

## 2025-05-07 NOTE — H&P
[facility] CARDIOLOGY History &Physical                 Primary Cardiologist: Dr Alcantara    Primary Care Physician: Caitlin Rizvi MD    Admitting Physician: Dr Alcantara    Subjective:     Patient is a 69 y.o. male who presents with CP. He has a h/o CAD with prior CABG x 4 (8/2022 [facility] w DG- PCI to SVG to OM1, patent SVT to OM1, patent LIMA to D1 to LAD, occluded SVG to RCA), ischemic cardiomyopathy s/p BS ICD, sHF, VF/VT on amiodarone, HTN, HLD, COPD, AAA s/p repair, PAD s/p intervention, GERD and tobacco abuse.  Presented to the ER w CP.    In ER K 4, cr 1.17, glucose 161, trop 33 and 33, CBC and LFT's wnl, CXR no acute process, EKG NSR w rate 68 w NSST/T wave changes, /93.    The patient reports new central chest pain, heaviness, radiating to arms w arm numbness, 10/10 at times, pain occurring w exertion, lasts until rest.  No worse or more frequent but he had a bad episode yesterday after moving a table and mowing grass.  Occasionally takes a norco which helps w pain.  Pain comes and goes.  No nausea, diaphoresis but he has had mild SOB w the pain.  No dizziness, palpitations, syncope or LE edema. Pain resolved today w morphine.      Home cardiac meds:  Amio 200 mg qd  Asa 81 mg qd  Coreg 6.25 bid  Plavix 75 mg qd  Lisinopril 20 mg qd  Repatha  Crestor 40 mg qd     Past cardiac eval:  2/2023 [facility] inferior wall infarction, minimally reversible; EF 47%  8/2022 [facility] w DG- PCI to SVG to OM1, patent SVT to OM1, patent LIMA to D1 to LAD, occluded SVG to RCA    Soc: edel Reese, took children's photos, ; quit tobacco around age   FH:  Mom w mi in her 60's     Past Medical History:   Diagnosis Date    AAA (abdominal aortic aneurysm) 9/23/2011 9/29/11 AORTIC ABDOMINAL ANUERYSM REPAIR ENDOVASCULAR (EVAR)-  / Hao 4.6cm on US 9/23/11     Acute myocardial infarction (HCC) February, 2010    MI 2/10 - Quad bypass 2/25/10 ,4/2016    Anticoagulation monitoring, INR range 2-3 10/5/2011    Anxiety

## 2025-05-07 NOTE — ED NOTES
TRANSFER - OUT REPORT:    Verbal report given to Michelle KINGSTON on [facility]  being transferred to [facility] for routine progression of patient care       Report consisted of patient's Situation, Background, Assessment and   Recommendations(SBAR).     Information from the following report(s) Nurse Handoff Report was reviewed with the receiving nurse.    [place] Fall Assessment:    Presents to emergency department  because of falls (Syncope, seizure, or loss of consciousness): No  Age > 70: No  Altered Mental Status, Intoxication with alcohol or substance confusion (Disorientation, impaired judgment, poor safety awaremess, or inability to follow instructions): No  Impaired Mobility: Ambulates or transfers with assistive devices or assistance; Unable to ambulate or transer.: No  Nursing Judgement: Yes          Lines:   Peripheral IV 05/07/25 Right Antecubital (Active)   Site Assessment Clean, dry & intact 05/07/25 1148   Line Status Blood return noted;Normal saline locked;Flushed 05/07/25 1148   Phlebitis Assessment No symptoms 05/07/25 1148   Infiltration Assessment 0 05/07/25 1148   Dressing Status Clean, dry & intact 05/07/25 1148   Dressing Type Transparent 05/07/25 1148        Opportunity for questions and clarification was provided.      Patient transported with:  Monitor and Registered Nurse          Bassem Hanks RN  05/07/25 2383

## 2025-05-07 NOTE — PROGRESS NOTES
1726: Pt arrival to floor from ED. C/o 6/10 CP.     1752: 0.4 mg  SL nitro administered per MAR. HR 55. BP 96/71. CP 6/10.      1757: BP 85/54.  HR 55. C/o headache, lightheaded, dizzy. CP remains 6/10.    1804: Manual /78. HR 52. Pt reports \"slight\" resolution in dizziness/lightheadedness. CP remains 6/10. Norco 10 mg Q4 PRN ordered, but pt states \"it doesn't work at all.\" Torri Trejo NP notified. Orders placed for IV morphine 2 mg Q6 PRN and scheduled nitro paste Q6.     1830: IV morphine administered per MAR.  /66. HR 58. CP 6/10. See flowsheets for further documentation.

## 2025-05-07 NOTE — ED PROVIDER NOTES
Emergency Department Provider Note       [facility] EMERGENCY DEPT   PCP: Caitlin Rizvi MD   Age: 69 y.o.   Sex: male     DISPOSITION Decision To Admit 05/07/2025 02:21:54 PM    ICD-10-CM    1. Unstable angina (HCC)  I20.0           Medical Decision Making     This is a nonacute other chronically ill 69-year-old male with extensive cardiac history as noted in HPI coming into the emergency department for complaints of retrosternal chest pressure that is been ongoing since last night into today per his report.  He does report some exertional symptoms exacerbating his angina.  Associated shortness of breath.  No signs of acute volume overload.  Active angina during my triage.  324 ASA and morphine was ordered.  Workup here today generally unremarkable.  Flat elevated troponins at 33.4 likely secondary to chronic disease.  His EKG compared to 2024 shows some nonspecific T wave abnormalities now primarily in inferior leads.  Mild lateral changes.  No STEMI though.  Patient has improvement of symptoms with morphine.  Given extensive history and unstable angina we reached out to cardiology, Dr. Alcantara, who is in agreement for evaluation and admission of this patient.  Findings were discussed at length the patient and which he is in agreement with.    This patient was seen in consultation with the ER attending, Dr. Martins who is in agreement with this plan.  ED Course as of 05/07/25 1450   Wed May 07, 2025   1231 CBC and CMP grossly unremarkable without emergent abnormalities. [TC]   1231 Troponin elevated at 33.4 which could be secondary to chronic disease.  Will trend.  Aspirin and morphine ordered. [TC]   1339 Given patient's significant history of cardiac disease and active angina, ER attending, Dr. Martins has been added to case.  Will evaluate. [TC]   1416 Repeat troponin flat [TC]   1441 Cardiology bedside to evaluate patient. [TC]      ED Course User Index  [TC] Lc Bell, APRN - NP     1 acute complicated illness or

## 2025-05-07 NOTE — FLOWSHEET NOTE
05/07/25 1726   Dual Clinician Skin Assessment   Dual Skin Assessment (4 Eyes) WDL   Second Clinical  (First and Last Name) Jessica KINGSTON   Skin Integumentary    Skin Color Pale;Red;Ecchymosis (comment);Blanchable erythema   Skin Condition/Temp Warm;Dry;Flaky   Skin Integrity Ecchymosis;Redness;Scars (comment)   Location intact blanchable redness to sacrum, scattered scars/bruising   Skin Integumentary (WDL) X   Wound Prevention and Protection Methods   Location of Wound Prevention Coccyx;Sacrum   Orientation of Wound Prevention Mid;Posterior   Dressing Present  Yes  (new)     Heels intact and  free from redness. All other impairments noted above.     4 Eyes Skin Assessment     NAME:  Mihai Ledesma  YOB: 1955  MEDICAL RECORD NUMBER:  [id number]    The patient is being assessed for  Admission    I agree that at least one RN has performed a thorough Head to Toe Skin Assessment on the patient. ALL assessment sites listed below have been assessed.      Areas assessed by both nurses:    Head, Face, Ears, Shoulders, Back, Chest, Arms, Elbows, Hands, Sacrum. Buttock, Coccyx, Ischium, and Legs. Feet and Heels        Does the Patient have a Wound? No noted wound(s)       Joshua Prevention initiated by RN: No  Wound Care Orders initiated by RN: No    Pressure Injury (Stage 3,4, Unstageable, DTI, NWPT, and Complex wounds) if present, place Wound referral order by RN under : No    New Ostomies, if present place, Ostomy referral order under : No     Nurse 1 eSignature: Electronically signed by Michelle George RN on 5/7/25 at 6:38 PM EDT    **SHARE this note so that the co-signing nurse can place an eSignature**    Nurse 2 eSignature: Electronically signed by JESSICA ANGELA RN on 5/7/25 at 6:40 PM EDT

## 2025-05-07 NOTE — PROGRESS NOTES
TRANSFER - IN REPORT:    Verbal report received from Bassem RN on [facility]  being received from ED for routine progression of patient care      Report consisted of patient's Situation, Background, Assessment and   Recommendations(SBAR).     Information from the following report(s) Nurse Handoff Report, ED Encounter Summary, ED SBAR, Adult Overview, Cardiac Rhythm SR, and Neuro Assessment was reviewed with the receiving nurse.    Opportunity for questions and clarification was provided.      Assessment completed upon patient's arrival to unit and care assumed.

## 2025-05-07 NOTE — CARE COORDINATION
05/07/25 1445   Service Assessment   Patient Orientation Alert and Oriented   Cognition Alert   History Provided By Patient;Significant Other   Primary Caregiver Self   Support Systems Spouse/Significant Other   PCP Verified by CM Yes  (EDUAR Vasquez)   Prior Functional Level Independent in ADLs/IADLs   Current Functional Level Independent in ADLs/IADLs   Can patient return to prior living arrangement Yes   Ability to make needs known: Good   Family able to assist with home care needs: Yes   Would you like for me to discuss the discharge plan with any other family members/significant others, and if so, who? Yes  (wife Constanza Ledesma)   Financial Resources Medicare   Community Resources None   Social/Functional History   Lives With Spouse   Type of Home Mobile home   Home Equipment None   Prior Level of Assist for ADLs Independent   Mode of Transportation Car   Discharge Planning   Type of Residence Trailer/Mobile Home   Living Arrangements Spouse/Significant Other   Current Services Prior To Admission None   Potential Assistance Purchasing Medications No   Patient expects to be discharged to: Trailer/mobile home     RN CM met with the patient and wife at the bedside in the Emergency Department and discussed discharge planning. He is a potential admission to the hospital. He lives with his wife and is independent of ADLs. He does not use DMEs at baseline. He has health insurance and a PCP. He confirmed he has transportation home from the hospital. Discharge planning is pending the patient's clinical course in the hospital.

## 2025-05-08 LAB
ACT BLD: 389 SECS (ref 70–128)
ANION GAP SERPL CALC-SCNC: 11 MMOL/L (ref 7–16)
BUN SERPL-MCNC: 20 MG/DL (ref 8–23)
CALCIUM SERPL-MCNC: 8.8 MG/DL (ref 8.8–10.2)
CHLORIDE SERPL-SCNC: 108 MMOL/L (ref 98–107)
CHOLEST SERPL-MCNC: 73 MG/DL (ref 0–200)
CO2 SERPL-SCNC: 19 MMOL/L (ref 20–29)
CREAT SERPL-MCNC: 1.18 MG/DL (ref 0.8–1.3)
ECHO BSA: 2.36 M2
EKG ATRIAL RATE: 49 BPM
EKG ATRIAL RATE: 49 BPM
EKG ATRIAL RATE: 54 BPM
EKG DIAGNOSIS: NORMAL
EKG P AXIS: 32 DEGREES
EKG P AXIS: 41 DEGREES
EKG P AXIS: 63 DEGREES
EKG P-R INTERVAL: 170 MS
EKG P-R INTERVAL: 172 MS
EKG P-R INTERVAL: 180 MS
EKG Q-T INTERVAL: 454 MS
EKG Q-T INTERVAL: 510 MS
EKG Q-T INTERVAL: 522 MS
EKG QRS DURATION: 108 MS
EKG QRS DURATION: 114 MS
EKG QRS DURATION: 114 MS
EKG QTC CALCULATION (BAZETT): 430 MS
EKG QTC CALCULATION (BAZETT): 460 MS
EKG QTC CALCULATION (BAZETT): 471 MS
EKG R AXIS: 40 DEGREES
EKG R AXIS: 65 DEGREES
EKG R AXIS: 72 DEGREES
EKG T AXIS: -88 DEGREES
EKG T AXIS: 255 DEGREES
EKG T AXIS: 270 DEGREES
EKG VENTRICULAR RATE: 49 BPM
EKG VENTRICULAR RATE: 49 BPM
EKG VENTRICULAR RATE: 54 BPM
ERYTHROCYTE [DISTWIDTH] IN BLOOD BY AUTOMATED COUNT: 13.1 % (ref 11.9–14.6)
GLUCOSE BLD STRIP.AUTO-MCNC: 115 MG/DL (ref 65–100)
GLUCOSE BLD STRIP.AUTO-MCNC: 152 MG/DL (ref 65–100)
GLUCOSE BLD STRIP.AUTO-MCNC: 192 MG/DL (ref 65–100)
GLUCOSE SERPL-MCNC: 115 MG/DL (ref 70–99)
HCT VFR BLD AUTO: 44.8 % (ref 41.1–50.3)
HDLC SERPL-MCNC: 22 MG/DL (ref 40–60)
HDLC SERPL: 3.4 (ref 0–5)
HGB BLD-MCNC: 14.3 G/DL (ref 13.6–17.2)
LDLC SERPL CALC-MCNC: ABNORMAL MG/DL (ref 0–100)
MCH RBC QN AUTO: 28.7 PG (ref 26.1–32.9)
MCHC RBC AUTO-ENTMCNC: 31.9 G/DL (ref 31.4–35)
MCV RBC AUTO: 89.8 FL (ref 82–102)
NRBC # BLD: 0 K/UL (ref 0–0.2)
PLATELET # BLD AUTO: 222 K/UL (ref 150–450)
PMV BLD AUTO: 9.3 FL (ref 9.4–12.3)
POTASSIUM SERPL-SCNC: 4 MMOL/L (ref 3.5–5.1)
RBC # BLD AUTO: 4.99 M/UL (ref 4.23–5.6)
SERVICE CMNT-IMP: ABNORMAL
SODIUM SERPL-SCNC: 138 MMOL/L (ref 136–145)
TRIGL SERPL-MCNC: 285 MG/DL (ref 0–150)
VLDLC SERPL CALC-MCNC: 57 MG/DL (ref 6–23)
WBC # BLD AUTO: 9.8 K/UL (ref 4.3–11.1)

## 2025-05-08 PROCEDURE — 93010 ELECTROCARDIOGRAM REPORT: CPT | Performed by: INTERNAL MEDICINE

## 2025-05-08 PROCEDURE — 94761 N-INVAS EAR/PLS OXIMETRY MLT: CPT

## 2025-05-08 PROCEDURE — 2709999900 HC NON-CHARGEABLE SUPPLY: Performed by: INTERNAL MEDICINE

## 2025-05-08 PROCEDURE — 2580000003 HC RX 258: Performed by: INTERNAL MEDICINE

## 2025-05-08 PROCEDURE — 92928 PRQ TCAT PLMT NTRAC ST 1 LES: CPT | Performed by: INTERNAL MEDICINE

## 2025-05-08 PROCEDURE — 85027 COMPLETE CBC AUTOMATED: CPT

## 2025-05-08 PROCEDURE — 027136Z DILATION OF CORONARY ARTERY, TWO ARTERIES WITH THREE DRUG-ELUTING INTRALUMINAL DEVICES, PERCUTANEOUS APPROACH: ICD-10-PCS | Performed by: INTERNAL MEDICINE

## 2025-05-08 PROCEDURE — 93459 L HRT ART/GRFT ANGIO: CPT | Performed by: INTERNAL MEDICINE

## 2025-05-08 PROCEDURE — B2111ZZ FLUOROSCOPY OF MULTIPLE CORONARY ARTERIES USING LOW OSMOLAR CONTRAST: ICD-10-PCS | Performed by: INTERNAL MEDICINE

## 2025-05-08 PROCEDURE — 6360000002 HC RX W HCPCS: Performed by: INTERNAL MEDICINE

## 2025-05-08 PROCEDURE — 2580000003 HC RX 258: Performed by: PHYSICIAN ASSISTANT

## 2025-05-08 PROCEDURE — 2500000003 HC RX 250 WO HCPCS: Performed by: NURSE PRACTITIONER

## 2025-05-08 PROCEDURE — 80048 BASIC METABOLIC PNL TOTAL CA: CPT

## 2025-05-08 PROCEDURE — 99152 MOD SED SAME PHYS/QHP 5/>YRS: CPT | Performed by: INTERNAL MEDICINE

## 2025-05-08 PROCEDURE — B2151ZZ FLUOROSCOPY OF LEFT HEART USING LOW OSMOLAR CONTRAST: ICD-10-PCS | Performed by: INTERNAL MEDICINE

## 2025-05-08 PROCEDURE — 6370000000 HC RX 637 (ALT 250 FOR IP): Performed by: PHYSICIAN ASSISTANT

## 2025-05-08 PROCEDURE — C1887 CATHETER, GUIDING: HCPCS | Performed by: INTERNAL MEDICINE

## 2025-05-08 PROCEDURE — C1725 CATH, TRANSLUMIN NON-LASER: HCPCS | Performed by: INTERNAL MEDICINE

## 2025-05-08 PROCEDURE — C9604 PERC D-E COR REVASC T CABG S: HCPCS | Performed by: INTERNAL MEDICINE

## 2025-05-08 PROCEDURE — 99153 MOD SED SAME PHYS/QHP EA: CPT | Performed by: INTERNAL MEDICINE

## 2025-05-08 PROCEDURE — 36415 COLL VENOUS BLD VENIPUNCTURE: CPT

## 2025-05-08 PROCEDURE — 4A023N7 MEASUREMENT OF CARDIAC SAMPLING AND PRESSURE, LEFT HEART, PERCUTANEOUS APPROACH: ICD-10-PCS | Performed by: INTERNAL MEDICINE

## 2025-05-08 PROCEDURE — C9600 PERC DRUG-EL COR STENT SING: HCPCS | Performed by: INTERNAL MEDICINE

## 2025-05-08 PROCEDURE — 82962 GLUCOSE BLOOD TEST: CPT

## 2025-05-08 PROCEDURE — C1894 INTRO/SHEATH, NON-LASER: HCPCS | Performed by: INTERNAL MEDICINE

## 2025-05-08 PROCEDURE — 80061 LIPID PANEL: CPT

## 2025-05-08 PROCEDURE — C1769 GUIDE WIRE: HCPCS | Performed by: INTERNAL MEDICINE

## 2025-05-08 PROCEDURE — C1874 STENT, COATED/COV W/DEL SYS: HCPCS | Performed by: INTERNAL MEDICINE

## 2025-05-08 PROCEDURE — 2140000000 HC CCU INTERMEDIATE R&B

## 2025-05-08 PROCEDURE — 93005 ELECTROCARDIOGRAM TRACING: CPT | Performed by: INTERNAL MEDICINE

## 2025-05-08 PROCEDURE — 6370000000 HC RX 637 (ALT 250 FOR IP): Performed by: INTERNAL MEDICINE

## 2025-05-08 PROCEDURE — 92937 PRQ TRLUML REVSC CAB GRF 1: CPT | Performed by: INTERNAL MEDICINE

## 2025-05-08 PROCEDURE — 85347 COAGULATION TIME ACTIVATED: CPT

## 2025-05-08 DEVICE — STENT ONYXNG30022UX ONYX 3.00X22RX
Type: IMPLANTABLE DEVICE | Site: CORONARY | Status: FUNCTIONAL
Brand: ONYX FRONTIER™

## 2025-05-08 DEVICE — STENT ONYXNG30030UX ONYX 3.00X30RX
Type: IMPLANTABLE DEVICE | Status: FUNCTIONAL
Brand: ONYX FRONTIER™

## 2025-05-08 DEVICE — STENT ONYXNG20030UX ONYX 2.00X30RX
Type: IMPLANTABLE DEVICE | Site: CORONARY | Status: FUNCTIONAL
Brand: ONYX FRONTIER™

## 2025-05-08 RX ORDER — BIVALIRUDIN 250 MG/5ML
INJECTION, POWDER, LYOPHILIZED, FOR SOLUTION INTRAVENOUS PRN
Status: DISCONTINUED | OUTPATIENT
Start: 2025-05-08 | End: 2025-05-08 | Stop reason: HOSPADM

## 2025-05-08 RX ORDER — SODIUM CHLORIDE 0.9 % (FLUSH) 0.9 %
5-40 SYRINGE (ML) INJECTION PRN
Status: DISCONTINUED | OUTPATIENT
Start: 2025-05-08 | End: 2025-05-09 | Stop reason: HOSPADM

## 2025-05-08 RX ORDER — LIDOCAINE HYDROCHLORIDE 10 MG/ML
INJECTION, SOLUTION INFILTRATION; PERINEURAL PRN
Status: DISCONTINUED | OUTPATIENT
Start: 2025-05-08 | End: 2025-05-08 | Stop reason: HOSPADM

## 2025-05-08 RX ORDER — SODIUM CHLORIDE 0.9 % (FLUSH) 0.9 %
5-40 SYRINGE (ML) INJECTION EVERY 12 HOURS SCHEDULED
Status: DISCONTINUED | OUTPATIENT
Start: 2025-05-08 | End: 2025-05-09 | Stop reason: HOSPADM

## 2025-05-08 RX ORDER — CLOPIDOGREL BISULFATE 75 MG/1
75 TABLET ORAL DAILY
Status: DISCONTINUED | OUTPATIENT
Start: 2025-05-09 | End: 2025-05-09 | Stop reason: HOSPADM

## 2025-05-08 RX ORDER — CLOPIDOGREL BISULFATE 75 MG/1
TABLET ORAL PRN
Status: DISCONTINUED | OUTPATIENT
Start: 2025-05-08 | End: 2025-05-08 | Stop reason: HOSPADM

## 2025-05-08 RX ORDER — MIDAZOLAM HYDROCHLORIDE 1 MG/ML
INJECTION, SOLUTION INTRAMUSCULAR; INTRAVENOUS PRN
Status: DISCONTINUED | OUTPATIENT
Start: 2025-05-08 | End: 2025-05-08 | Stop reason: HOSPADM

## 2025-05-08 RX ORDER — SODIUM CHLORIDE 9 MG/ML
INJECTION, SOLUTION INTRAVENOUS CONTINUOUS
Status: ACTIVE | OUTPATIENT
Start: 2025-05-08 | End: 2025-05-09

## 2025-05-08 RX ORDER — SODIUM CHLORIDE 9 MG/ML
INJECTION, SOLUTION INTRAVENOUS PRN
Status: DISCONTINUED | OUTPATIENT
Start: 2025-05-08 | End: 2025-05-09 | Stop reason: HOSPADM

## 2025-05-08 RX ORDER — MAGNESIUM HYDROXIDE/ALUMINUM HYDROXICE/SIMETHICONE 120; 1200; 1200 MG/30ML; MG/30ML; MG/30ML
SUSPENSION ORAL PRN
Status: DISCONTINUED | OUTPATIENT
Start: 2025-05-08 | End: 2025-05-08 | Stop reason: HOSPADM

## 2025-05-08 RX ORDER — ASPIRIN 81 MG/1
81 TABLET ORAL DAILY
Status: DISCONTINUED | OUTPATIENT
Start: 2025-05-09 | End: 2025-05-09 | Stop reason: HOSPADM

## 2025-05-08 RX ORDER — HEPARIN SODIUM 200 [USP'U]/100ML
INJECTION, SOLUTION INTRAVENOUS CONTINUOUS PRN
Status: COMPLETED | OUTPATIENT
Start: 2025-05-08 | End: 2025-05-08

## 2025-05-08 RX ORDER — NITROGLYCERIN 20 MG/100ML
INJECTION INTRAVENOUS PRN
Status: DISCONTINUED | OUTPATIENT
Start: 2025-05-08 | End: 2025-05-08 | Stop reason: HOSPADM

## 2025-05-08 RX ORDER — ACETAMINOPHEN 325 MG/1
650 TABLET ORAL EVERY 4 HOURS PRN
Status: DISCONTINUED | OUTPATIENT
Start: 2025-05-08 | End: 2025-05-09 | Stop reason: HOSPADM

## 2025-05-08 RX ADMIN — LISINOPRIL 20 MG: 20 TABLET ORAL at 08:00

## 2025-05-08 RX ADMIN — CARVEDILOL 6.25 MG: 6.25 TABLET, FILM COATED ORAL at 08:01

## 2025-05-08 RX ADMIN — SODIUM CHLORIDE: 0.9 INJECTION, SOLUTION INTRAVENOUS at 16:33

## 2025-05-08 RX ADMIN — ACETAMINOPHEN 650 MG: 325 TABLET ORAL at 12:02

## 2025-05-08 RX ADMIN — EZETIMIBE 10 MG: 10 TABLET ORAL at 08:01

## 2025-05-08 RX ADMIN — ROPINIROLE HYDROCHLORIDE 1 MG: 1 TABLET, FILM COATED ORAL at 20:12

## 2025-05-08 RX ADMIN — INSULIN LISPRO 1 UNITS: 100 INJECTION, SOLUTION INTRAVENOUS; SUBCUTANEOUS at 20:12

## 2025-05-08 RX ADMIN — ROSUVASTATIN CALCIUM 40 MG: 20 TABLET, FILM COATED ORAL at 20:12

## 2025-05-08 RX ADMIN — SODIUM CHLORIDE: 0.9 INJECTION, SOLUTION INTRAVENOUS at 05:58

## 2025-05-08 RX ADMIN — MORPHINE SULFATE 2 MG: 2 INJECTION, SOLUTION INTRAMUSCULAR; INTRAVENOUS at 00:41

## 2025-05-08 RX ADMIN — MORPHINE SULFATE 2 MG: 2 INJECTION, SOLUTION INTRAMUSCULAR; INTRAVENOUS at 13:37

## 2025-05-08 RX ADMIN — ROPINIROLE HYDROCHLORIDE 1 MG: 1 TABLET, FILM COATED ORAL at 08:00

## 2025-05-08 RX ADMIN — MORPHINE SULFATE 2 MG: 2 INJECTION, SOLUTION INTRAMUSCULAR; INTRAVENOUS at 06:50

## 2025-05-08 RX ADMIN — SERTRALINE 50 MG: 25 TABLET, FILM COATED ORAL at 08:01

## 2025-05-08 RX ADMIN — ASPIRIN 81 MG: 81 TABLET ORAL at 08:01

## 2025-05-08 RX ADMIN — PANTOPRAZOLE SODIUM 40 MG: 40 TABLET, DELAYED RELEASE ORAL at 05:31

## 2025-05-08 RX ADMIN — FENOFIBRATE 160 MG: 160 TABLET ORAL at 08:01

## 2025-05-08 RX ADMIN — AMIODARONE HYDROCHLORIDE 200 MG: 200 TABLET ORAL at 08:00

## 2025-05-08 RX ADMIN — CLOPIDOGREL 75 MG: 75 TABLET, FILM COATED ORAL at 08:01

## 2025-05-08 RX ADMIN — MORPHINE SULFATE 2 MG: 2 INJECTION, SOLUTION INTRAMUSCULAR; INTRAVENOUS at 20:15

## 2025-05-08 ASSESSMENT — PAIN DESCRIPTION - DESCRIPTORS
DESCRIPTORS: PRESSURE
DESCRIPTORS: DISCOMFORT
DESCRIPTORS: THROBBING;ACHING
DESCRIPTORS: DISCOMFORT
DESCRIPTORS: PRESSURE

## 2025-05-08 ASSESSMENT — PAIN DESCRIPTION - LOCATION
LOCATION: CHEST
LOCATION: HEAD
LOCATION: CHEST

## 2025-05-08 ASSESSMENT — PAIN - FUNCTIONAL ASSESSMENT
PAIN_FUNCTIONAL_ASSESSMENT: ACTIVITIES ARE NOT PREVENTED

## 2025-05-08 ASSESSMENT — PAIN DESCRIPTION - PAIN TYPE
TYPE: ACUTE PAIN
TYPE: ACUTE PAIN
TYPE: SURGICAL PAIN;ACUTE PAIN

## 2025-05-08 ASSESSMENT — PAIN SCALES - GENERAL
PAINLEVEL_OUTOF10: 4
PAINLEVEL_OUTOF10: 5
PAINLEVEL_OUTOF10: 7
PAINLEVEL_OUTOF10: 7
PAINLEVEL_OUTOF10: 4

## 2025-05-08 ASSESSMENT — PAIN DESCRIPTION - ORIENTATION
ORIENTATION: MID
ORIENTATION: ANTERIOR
ORIENTATION: ANTERIOR
ORIENTATION: MID

## 2025-05-08 ASSESSMENT — PAIN DESCRIPTION - FREQUENCY
FREQUENCY: CONTINUOUS
FREQUENCY: CONTINUOUS

## 2025-05-08 ASSESSMENT — PAIN DESCRIPTION - ONSET
ONSET: ON-GOING
ONSET: ON-GOING

## 2025-05-08 NOTE — PROGRESS NOTES
TRANSFER - IN REPORT:    Verbal report received from Chey KINGSTON on [facility]  being received from [facility] for routine progression of patient care      Report consisted of patient's Situation, Background, Assessment and   Recommendations(SBAR).     Information from the following report(s) Nurse Handoff Report, Adult Overview, Surgery Report, MAR, Cardiac Rhythm SB, Quality Measures, and Neuro Assessment was reviewed with the receiving nurse.    Opportunity for questions and clarification was provided.      Assessment completed upon patient's arrival to unit and care assumed.      R fem s/p [facility].

## 2025-05-08 NOTE — PROGRESS NOTES
TRANSFER - OUT REPORT:    Verbal report given to THEE gupta on [facility]  being transferred to [facility] for ordered procedure       Report consisted of patient’s Situation, Background, Assessment and Recommendations(SBAR).     Information from the following report(s) Procedure Summary, MAR, and Med Rec Status was reviewed with the receiving nurse.    Opportunity for questions and clarification was provided.      C by Dr. centeno  Right femoral arterial access  X2 stents to LM/Ramus  X1 stent to SVG to OM graft  Femoral sheath remained in RFA, sutured with tegaderm   6 mg Versed  100 mcg Fentanyl  600 mg plavix  30 mL maalox  Angiomax bolus and gtt, run for 30 minutes  Access site soft. Clean, dry, and intact; with no signs of bleeding or hematoma  Pt. Tolerated procedure

## 2025-05-08 NOTE — PLAN OF CARE
Problem: Chronic Conditions and Co-morbidities  Goal: Patient's chronic conditions and co-morbidity symptoms are monitored and maintained or improved  Outcome: Progressing  Flowsheets (Taken 5/8/2025 0800)  Care Plan - Patient's Chronic Conditions and Co-Morbidity Symptoms are Monitored and Maintained or Improved:   Monitor and assess patient's chronic conditions and comorbid symptoms for stability, deterioration, or improvement   Collaborate with multidisciplinary team to address chronic and comorbid conditions and prevent exacerbation or deterioration   Update acute care plan with appropriate goals if chronic or comorbid symptoms are exacerbated and prevent overall improvement and discharge     Problem: Discharge Planning  Goal: Discharge to home or other facility with appropriate resources  Outcome: Progressing  Flowsheets (Taken 5/8/2025 0800)  Discharge to home or other facility with appropriate resources:   Identify barriers to discharge with patient and caregiver   Arrange for needed discharge resources and transportation as appropriate   Identify discharge learning needs (meds, wound care, etc)   Arrange for interpreters to assist at discharge as needed   Refer to discharge planning if patient needs post-hospital services based on physician order or complex needs related to functional status, cognitive ability or social support system     Problem: Pain  Goal: Verbalizes/displays adequate comfort level or baseline comfort level  Outcome: Progressing  Flowsheets (Taken 5/8/2025 0800)  Verbalizes/displays adequate comfort level or baseline comfort level:   Encourage patient to monitor pain and request assistance   Assess pain using appropriate pain scale   Administer analgesics based on type and severity of pain and evaluate response   Implement non-pharmacological measures as appropriate and evaluate response   Consider cultural and social influences on pain and pain management   Notify Licensed Independent

## 2025-05-08 NOTE — CARE COORDINATION
CM following. Pt presented to the ED c/o CP. Is s/p LHC with Dr Aleman; tolerated the procedure. ED CM completed CMA: Pt lives with his wife and is independent of ADLs. He does not use DMEs at baseline. He has health insurance and a PCP. He confirmed he has transportation home from the hospital. Discharge planning is pending the patient's clinical course in the hospital. Pt is currently on 2L supplemental. No anticipated discharge needs identified by CM, will remain available.

## 2025-05-08 NOTE — PROGRESS NOTES
[facility] CARDIOLOGY PROGRESS NOTE    5/8/2025 7:27 AM    Admit Date: 5/7/2025        Subjective:   Stable overnight without CHF, or palpitations but continues to complain of chest discomfort.  He has chest discomfort that is worse with respiration sitting in the chair but also has worsening of scapular and chest pain on the left with any ambulation in the room that is worrisome for angina. Vitals stable and controlled. No other complaints overnight. Tolerating meds well.         Objective:      Vitals:    05/08/25 0039 05/08/25 0530 05/08/25 0551 05/08/25 0721   BP: 129/88 122/76 122/76 116/67   Pulse: 53 58  (!) 48   Resp: 18 18  18   Temp: 97.3 °F (36.3 °C) 97.5 °F (36.4 °C) 97.5 °F (36.4 °C) 97.5 °F (36.4 °C)   TempSrc: Oral Oral     SpO2: 96%   95%   Weight:  109 kg (240 lb 4.8 oz)     Height:           Physical Exam:  Neck- supple, no JVD  CV- regular rate and rhythm no MRG  Lung- clear bilaterally, decreased bibasilar but no crackles or wheezing  Abd- soft, nontender, nondistended  Ext- no edema  Skin- warm and dry    Data Review:   Pertinent lab and noninvasive imaging over the past 24 hours reviewed and evaluated.    Recent Labs     05/07/25  1148 05/08/25  0547     --    K 4.0  --    BUN 23  --    WBC 9.5 9.8   HGB 14.9 14.3   HCT 46.3 44.8    222     Lab Results   Component Value Date    LDL 61 12/04/2024    LDLDIRECT 163 (H) 05/13/2024     Echocardiogram 5/7/2025:  Left Ventricle Normal left ventricular systolic function with a visually estimated EF of 45 - 50%. Left ventricle size is normal. Normal wall thickness. See diagram for wall motion findings. Abnormal diastolic function.   Left Atrium Left atrium is mildly dilated.   Right Ventricle Right ventricle size is normal. Lead present in the right ventricle. Normal systolic function.   Right Atrium Right atrium size is normal.   Aortic Valve Mild sclerosis of the aortic valve cusps. No regurgitation. No stenosis.   Mitral Valve Valve

## 2025-05-09 VITALS
SYSTOLIC BLOOD PRESSURE: 138 MMHG | TEMPERATURE: 97.5 F | HEART RATE: 51 BPM | WEIGHT: 238.3 LBS | HEIGHT: 72 IN | OXYGEN SATURATION: 96 % | RESPIRATION RATE: 12 BRPM | DIASTOLIC BLOOD PRESSURE: 80 MMHG | BODY MASS INDEX: 32.28 KG/M2

## 2025-05-09 PROBLEM — R07.9 CHEST PAIN: Status: RESOLVED | Noted: 2025-05-07 | Resolved: 2025-05-09

## 2025-05-09 LAB
ANION GAP SERPL CALC-SCNC: 9 MMOL/L (ref 7–16)
BUN SERPL-MCNC: 14 MG/DL (ref 8–23)
CALCIUM SERPL-MCNC: 8.7 MG/DL (ref 8.8–10.2)
CHLORIDE SERPL-SCNC: 110 MMOL/L (ref 98–107)
CO2 SERPL-SCNC: 22 MMOL/L (ref 20–29)
CREAT SERPL-MCNC: 1.02 MG/DL (ref 0.8–1.3)
ERYTHROCYTE [DISTWIDTH] IN BLOOD BY AUTOMATED COUNT: 13 % (ref 11.9–14.6)
GLUCOSE BLD STRIP.AUTO-MCNC: 116 MG/DL (ref 65–100)
GLUCOSE SERPL-MCNC: 107 MG/DL (ref 70–99)
HCT VFR BLD AUTO: 40.8 % (ref 41.1–50.3)
HGB BLD-MCNC: 13.5 G/DL (ref 13.6–17.2)
MAGNESIUM SERPL-MCNC: 2.1 MG/DL (ref 1.8–2.4)
MCH RBC QN AUTO: 28.7 PG (ref 26.1–32.9)
MCHC RBC AUTO-ENTMCNC: 33.1 G/DL (ref 31.4–35)
MCV RBC AUTO: 86.8 FL (ref 82–102)
NRBC # BLD: 0 K/UL (ref 0–0.2)
PLATELET # BLD AUTO: 204 K/UL (ref 150–450)
PMV BLD AUTO: 9.6 FL (ref 9.4–12.3)
POTASSIUM SERPL-SCNC: 4 MMOL/L (ref 3.5–5.1)
RBC # BLD AUTO: 4.7 M/UL (ref 4.23–5.6)
SERVICE CMNT-IMP: ABNORMAL
SODIUM SERPL-SCNC: 141 MMOL/L (ref 136–145)
WBC # BLD AUTO: 8.5 K/UL (ref 4.3–11.1)

## 2025-05-09 PROCEDURE — 36415 COLL VENOUS BLD VENIPUNCTURE: CPT

## 2025-05-09 PROCEDURE — 80048 BASIC METABOLIC PNL TOTAL CA: CPT

## 2025-05-09 PROCEDURE — 83735 ASSAY OF MAGNESIUM: CPT

## 2025-05-09 PROCEDURE — 99238 HOSP IP/OBS DSCHRG MGMT 30/<: CPT | Performed by: INTERNAL MEDICINE

## 2025-05-09 PROCEDURE — 82962 GLUCOSE BLOOD TEST: CPT

## 2025-05-09 PROCEDURE — 85027 COMPLETE CBC AUTOMATED: CPT

## 2025-05-09 PROCEDURE — 6370000000 HC RX 637 (ALT 250 FOR IP): Performed by: INTERNAL MEDICINE

## 2025-05-09 PROCEDURE — 6370000000 HC RX 637 (ALT 250 FOR IP): Performed by: PHYSICIAN ASSISTANT

## 2025-05-09 RX ORDER — CARVEDILOL 3.12 MG/1
3.12 TABLET ORAL DAILY
Status: DISCONTINUED | OUTPATIENT
Start: 2025-05-09 | End: 2025-05-09 | Stop reason: HOSPADM

## 2025-05-09 RX ORDER — ASPIRIN 81 MG/1
81 TABLET ORAL DAILY
Qty: 30 TABLET | Refills: 11 | Status: SHIPPED | OUTPATIENT
Start: 2025-05-09

## 2025-05-09 RX ORDER — CLOPIDOGREL BISULFATE 75 MG/1
75 TABLET ORAL DAILY
Qty: 90 TABLET | Refills: 3 | Status: SHIPPED | OUTPATIENT
Start: 2025-05-09

## 2025-05-09 RX ORDER — CARVEDILOL 3.12 MG/1
3.12 TABLET ORAL DAILY
Qty: 60 TABLET | Refills: 3 | Status: SHIPPED | OUTPATIENT
Start: 2025-05-09

## 2025-05-09 RX ORDER — NITROGLYCERIN 0.4 MG/1
0.4 TABLET SUBLINGUAL EVERY 5 MIN PRN
Qty: 25 TABLET | Refills: 3 | Status: SHIPPED | OUTPATIENT
Start: 2025-05-09

## 2025-05-09 RX ADMIN — CLOPIDOGREL 75 MG: 75 TABLET, FILM COATED ORAL at 08:55

## 2025-05-09 RX ADMIN — HYDROCODONE BITARTRATE AND ACETAMINOPHEN 1 TABLET: 10; 325 TABLET ORAL at 04:36

## 2025-05-09 RX ADMIN — EZETIMIBE 10 MG: 10 TABLET ORAL at 08:55

## 2025-05-09 RX ADMIN — AMIODARONE HYDROCHLORIDE 200 MG: 200 TABLET ORAL at 08:55

## 2025-05-09 RX ADMIN — PANTOPRAZOLE SODIUM 40 MG: 40 TABLET, DELAYED RELEASE ORAL at 04:37

## 2025-05-09 RX ADMIN — CARVEDILOL 3.12 MG: 3.12 TABLET, FILM COATED ORAL at 08:55

## 2025-05-09 RX ADMIN — LISINOPRIL 20 MG: 20 TABLET ORAL at 08:55

## 2025-05-09 RX ADMIN — ROPINIROLE HYDROCHLORIDE 1 MG: 1 TABLET, FILM COATED ORAL at 08:55

## 2025-05-09 RX ADMIN — SERTRALINE 50 MG: 25 TABLET, FILM COATED ORAL at 08:55

## 2025-05-09 RX ADMIN — ASPIRIN 81 MG: 81 TABLET ORAL at 08:55

## 2025-05-09 RX ADMIN — FENOFIBRATE 160 MG: 160 TABLET ORAL at 08:55

## 2025-05-09 ASSESSMENT — PAIN DESCRIPTION - LOCATION: LOCATION: CHEST

## 2025-05-09 NOTE — PROGRESS NOTES
SPIRITUAL HEALTH  Note for Med/Surg Visit                  Room # 414/01    Name: Mihai Ledesma           Age: 69 y.o.    Gender: male          MRN: [id number]  Hoahaoism: Confucianist       Preferred Language: English    Date: 05/09/25  Visit Time: Begin Time: (P) 1040 End Time : (P) 1045 Complexity of Encounter: (P) Low      Visit Summary:  offered spiritual care visit with patient. Patient declined a visit at this time.  is available for follow up at patient's request.      Referral/Consult From: (P) Rounding   Encounter Overview/Reason: (P) Spiritual/Emotional Needs  Encounter Code:     Crisis (if applicable):    Service Provided For: (P) Patient     Patient was not available. Patient declined visit but aware  can be notified as needed/desired    Analisa, Belief, Meaning:   Patient unable to assess at this time  Family/Friends   Rituals (if applicable)      Importance and Influence:  Patient unable to assess at this time  Family/Friends     Community:  Patient   unable to assess at this time   Family/Friends       Assessment and Plan of Care:   Emotions Expressed by Patient:   Assessment: (P) Unable to assess    Interventions by :   Intervention: (P) Sustaining Presence/Ministry of presence     Result/ Response by Patient:   Outcome: (P) Refused/Declined    Patient Plan of Care:   Plan and Referrals  Plan/Referrals: (P) Continue Support (comment)     Electronically signed by Chaplain FANY, on 5/9/2025 at 11:40 AM.  Spiritual Health  [facility]  [phone]

## 2025-05-09 NOTE — PROGRESS NOTES
[facility] CARDIOLOGY PROGRESS NOTE    5/9/2025 6:30 AM    Admit Date: 5/7/2025        Subjective:   Stable overnight without angina, CHF, or palpitations. Vitals stable and controlled. No other complaints overnight. Tolerating meds well.         Objective:      Vitals:    05/08/25 1900 05/08/25 1945 05/08/25 2342 05/09/25 0412   BP: (!) 111/58 111/66 113/67 118/65   Pulse: 52 52 (!) 49 53   Resp:  18 18 18   Temp:  97.5 °F (36.4 °C) 97.3 °F (36.3 °C) 97.7 °F (36.5 °C)   TempSrc:  Oral Oral Oral   SpO2:  97% 99% 96%   Weight:    108.1 kg (238 lb 4.8 oz)   Height:           Physical Exam:  Neck- supple, no JVD  CV- regular rate and rhythm no MRG  Lung- clear bilaterally  Abd- soft, nontender, nondistended  Ext- no edema, right groin clean, dry, intact  Skin- warm and dry    Data Review:   Pertinent lab and noninvasive imaging over the past 24 hours reviewed and evaluated.    Recent Labs     05/08/25  0547 05/09/25  0334    141   K 4.0 4.0   MG  --  2.1   BUN 20 14   WBC 9.8 8.5   HGB 14.3 13.5*   HCT 44.8 40.8*    204   CHOL 73  --    LDL Cannot be calculated  --    HDL 22*  --      Lab Results   Component Value Date    LDL Cannot be calculated 05/08/2025    LDLDIRECT 163 (H) 05/13/2024       Assessment and Plan:     Active Hospital Problems    *Chest pain-worrisome for angina-resolved status post PCI to SVG to OM and to the ramus intermedius branch.  Feels much better.  Continue dual antiplatelet therapy and maximal GDMT.  Follow-up with primary cardiologist in the next couple of weeks       S/P AAA (abdominal aortic aneurysm) repair-stable, asymptomatic, outpatient surveillance       Hyperlipidemia-continue high intensity statin with outpatient surveillance       COPD (chronic obstructive pulmonary disease) (HCC)-stable, titrate inhalers and nebulizer as needed       Hypertension-well-controlled, continue meds and titrate as needed after catheterization       Chronic systolic heart failure

## 2025-05-09 NOTE — CARE COORDINATION
Discharge order is in. Pt underwent cardiac catheterization on 5/8/25 by Dr. Aleman and tolerated the procedure well. Pt indicated that he did not want to participate in the BS CRP at this time. Pt is discharging home today in stable condition. No identified discharge needs identified by ANNMARIE, tx goals met.     05/09/25 0904   Services At/After Discharge   Transition of Care Consult (CM Consult) Discharge Planning   Services At/After Discharge None    Resource Information Provided? No   Mode of Transport at Discharge Other (see comment)  (Family)   Confirm Follow Up Transport Family   Condition of Participation: Discharge Planning   The Patient and/or Patient Representative was provided with a Choice of Provider? Patient   The Patient and/Or Patient Representative agree with the Discharge Plan? Yes   Freedom of Choice list was provided with basic dialogue that supports the patient's individualized plan of care/goals, treatment preferences, and shares the quality data associated with the providers?  Yes

## 2025-05-09 NOTE — PROGRESS NOTES
Cardiac Rehab: Spoke with patient regarding referral to cardiac rehab. Patient meets admission criteria based on PCI(5/8/25).  Written information about Cardiac Rehab given and reviewed with patient. Discussed lifestyle modifications to promote cardiac wellness. Patient indicated that he does not want to participate in the cardiac rehab program stating he feeling like his activity level at home is sufficent. His Cardiologist is Dr. Alcantara.      Thank you,  Penelope Eldridge, RN, BSN  Cardiopulmonary Rehabilitation Nurse Liaison  [facility]

## 2025-05-09 NOTE — PROGRESS NOTES
Discharge instructions were reviewed with patient. An opportunity was given for questions. All medications were reviewed, and information was given on the new medications. Patient verbalized understanding, and has no questions at this time.     done

## 2025-05-09 NOTE — DISCHARGE SUMMARY
[facility] Cardiology Discharge Summary     Patient ID:  Mihai Ledesma  [id number]  69 y.o.  1955    Admit date: 5/7/2025    Discharge date:  5/8/2025    Admitting Physician: Segundo Alcantara MD     Discharge Physician: Dr. Aleman    Admission Diagnoses: Unstable angina (HCC) [I20.0]  Chest pain [R07.9]  Chest pain, unspecified type [R07.9]    Discharge Diagnoses:   Patient Active Problem List    Diagnosis    Chest pain    S/P CABG (coronary artery bypass graft)    Tobacco abuse    Hyperlipidemia    Hypertension    Chronic systolic heart failure (HCC)    CAD (coronary artery disease), native coronary artery       Cardiology Procedures this admission:  Left heart catheterization with PCI, echocardiogram  Consults: none    Hospital Course: Patient presented to the ER with complaints of chest pain with radiation. He has pmhx of CAD with prior CABG x 4 (8/2022 [facility] w DG- PCI to SVG to OM1, patent SVT to OM1, patent LIMA to D1 to LAD, occluded SVG to RCA), ischemic cardiomyopathy s/p BS ICD, sHF, VF/VT on amiodarone, HTN, HLD, COPD, AAA s/p repair, PAD s/p intervention, GERD and tobacco abuse. Serial cardiac enzymes were done, mildly elevated but flat. Patient was admitted to telemetry for continued care.     Echocardiogram was done and showed:     Left Ventricle: Normal left ventricular systolic function with a visually estimated EF of 45 - 50%. Left ventricle size is normal. Normal wall thickness. See diagram for wall motion findings. Abnormal diastolic function.    Right Ventricle: Right ventricle size is normal. Lead present in the right ventricle. Normal systolic function.    Aortic Valve: Mild sclerosis of the aortic valve cusps.    Left Atrium: Left atrium is mildly dilated.    Patient underwent cardiac catheterization on 5/8/25 by Dr. Aleman.   [facility] results:     Ischemic cardiomyopathy, PCI to the left main, ramus intermedius branch, and the proximal to mid saphenous vein graft to  consider inferolateral ischemia  Prolonged QT  Abnormal ECG  When compared with ECG of 08-MAY-2025 12:48,  T wave inversion now evident in Lateral leads  Confirmed by MD ZAMORA DANIEL ([id number]) on 5/8/2025 5:36:37 PM     POCT Glucose    Collection Time: 05/08/25  7:48 PM   Result Value Ref Range    POC Glucose 192 (H) 65 - 100 mg/dL    Performed by: Cb    CBC    Collection Time: 05/09/25  3:34 AM   Result Value Ref Range    WBC 8.5 4.3 - 11.1 K/uL    RBC 4.70 4.23 - 5.6 M/uL    Hemoglobin 13.5 (L) 13.6 - 17.2 g/dL    Hematocrit 40.8 (L) 41.1 - 50.3 %    MCV 86.8 82 - 102 FL    MCH 28.7 26.1 - 32.9 PG    MCHC 33.1 31.4 - 35.0 g/dL    RDW 13.0 11.9 - 14.6 %    Platelets 204 150 - 450 K/uL    MPV 9.6 9.4 - 12.3 FL    nRBC 0.00 0.0 - 0.2 K/uL   Basic Metabolic Panel    Collection Time: 05/09/25  3:34 AM   Result Value Ref Range    Sodium 141 136 - 145 mmol/L    Potassium 4.0 3.5 - 5.1 mmol/L    Chloride 110 (H) 98 - 107 mmol/L    CO2 22 20 - 29 mmol/L    Anion Gap 9 7 - 16 mmol/L    Glucose 107 (H) 70 - 99 mg/dL    BUN 14 8 - 23 MG/DL    Creatinine 1.02 0.80 - 1.30 MG/DL    Est, Glom Filt Rate 81 >60 ml/min/1.73m2    Calcium 8.7 (L) 8.8 - 10.2 MG/DL   Magnesium    Collection Time: 05/09/25  3:34 AM   Result Value Ref Range    Magnesium 2.1 1.8 - 2.4 mg/dL   POCT Glucose    Collection Time: 05/09/25  7:47 AM   Result Value Ref Range    POC Glucose 116 (H) 65 - 100 mg/dL    Performed by: Roxi        Patient Instructions:   Current Discharge Medication List        START taking these medications    Details   nitroGLYCERIN (NITROSTAT) 0.4 MG SL tablet Place 1 tablet under the tongue every 5 minutes as needed for Chest pain up to max of 3 total doses. If no relief after 1 dose, call 911.  Qty: 25 tablet, Refills: 3           CONTINUE these medications which have CHANGED    Details   aspirin 81 MG EC tablet Take 1 tablet by mouth daily  Qty: 30 tablet, Refills: 11      carvedilol (COREG) 3.125 MG

## 2025-05-12 ENCOUNTER — TELEPHONE (OUTPATIENT)
Dept: PRIMARY CARE CLINIC | Facility: CLINIC | Age: 70
End: 2025-05-12

## 2025-05-12 NOTE — TELEPHONE ENCOUNTER
Care Transitions Initial Follow Up Call    Outreach made within 2 business days of discharge: Yes    Patient: Mihai Ledesma Patient : 1955   MRN: [id number]  Reason for Admission: Unstable angina   Discharge Date: 25       Spoke with: Mihai Lloydway   Pt reports he has a new PCP ([facility] in [place]) and is no longer under Dr. Rizvi.  Belkys removed as PCP     Discharge department/facility: [facility]  Follow Up  Future Appointments   Date Time Provider Department Center   2025  9:15 AM Segundo Alcantara MD [facility] AM[facility]   2025  8:00 AM [facility] 3 [facility] AM[facility]   2025  9:30 AM Becki Smith APRN - CNP [facility]       BAYLEE SMITH LPN

## 2025-05-12 NOTE — TELEPHONE ENCOUNTER
Care Transitions Initial Follow Up Call    Outreach made within 2 business days of discharge: Yes    Patient: Mihai Ledesma Patient : 1955   MRN: [id number]  Reason for Admission: Unstable angina   Discharge Date: 25       Spoke with: JOSH to call office regarding recent hospital discharge     Discharge department/facility: [facility]      Scheduled appointment with PCP within 7-14 days    Follow Up  Future Appointments   Date Time Provider Department Center   2025  9:15 AM Segundo Alcantara MD [facility] AM[facility]   2025  8:00 AM [facility]TR[facility] 3 [facility] AM[facility]   2025  9:30 AM Becki Smith APRN - CNP [facility]       BAYLEE SMITH LPN

## 2025-05-21 ENCOUNTER — OFFICE VISIT (OUTPATIENT)
Age: 70
End: 2025-05-21
Payer: MEDICARE

## 2025-05-21 VITALS
SYSTOLIC BLOOD PRESSURE: 110 MMHG | DIASTOLIC BLOOD PRESSURE: 60 MMHG | HEART RATE: 58 BPM | HEIGHT: 72 IN | BODY MASS INDEX: 32.37 KG/M2 | OXYGEN SATURATION: 98 % | WEIGHT: 239 LBS

## 2025-05-21 DIAGNOSIS — I25.10 CORONARY ARTERY DISEASE INVOLVING NATIVE CORONARY ARTERY OF NATIVE HEART WITHOUT ANGINA PECTORIS: Primary | ICD-10-CM

## 2025-05-21 DIAGNOSIS — I10 PRIMARY HYPERTENSION: ICD-10-CM

## 2025-05-21 DIAGNOSIS — I50.22 CHRONIC SYSTOLIC HEART FAILURE (HCC): ICD-10-CM

## 2025-05-21 DIAGNOSIS — E78.00 PURE HYPERCHOLESTEROLEMIA: ICD-10-CM

## 2025-05-21 PROCEDURE — 1036F TOBACCO NON-USER: CPT | Performed by: INTERNAL MEDICINE

## 2025-05-21 PROCEDURE — 1111F DSCHRG MED/CURRENT MED MERGE: CPT | Performed by: INTERNAL MEDICINE

## 2025-05-21 PROCEDURE — 1126F AMNT PAIN NOTED NONE PRSNT: CPT | Performed by: INTERNAL MEDICINE

## 2025-05-21 PROCEDURE — 99214 OFFICE O/P EST MOD 30 MIN: CPT | Performed by: INTERNAL MEDICINE

## 2025-05-21 PROCEDURE — G8417 CALC BMI ABV UP PARAM F/U: HCPCS | Performed by: INTERNAL MEDICINE

## 2025-05-21 PROCEDURE — 3074F SYST BP LT 130 MM HG: CPT | Performed by: INTERNAL MEDICINE

## 2025-05-21 PROCEDURE — 1123F ACP DISCUSS/DSCN MKR DOCD: CPT | Performed by: INTERNAL MEDICINE

## 2025-05-21 PROCEDURE — 3078F DIAST BP <80 MM HG: CPT | Performed by: INTERNAL MEDICINE

## 2025-05-21 PROCEDURE — G8428 CUR MEDS NOT DOCUMENT: HCPCS | Performed by: INTERNAL MEDICINE

## 2025-05-21 PROCEDURE — 3017F COLORECTAL CA SCREEN DOC REV: CPT | Performed by: INTERNAL MEDICINE

## 2025-05-21 RX ORDER — SPIRONOLACTONE 25 MG/1
25 TABLET ORAL DAILY
Qty: 90 TABLET | Refills: 3 | Status: SHIPPED | OUTPATIENT
Start: 2025-05-21

## 2025-05-21 NOTE — PROGRESS NOTES
reviewed with the patient today.       ASSESSMENT and PLAN    1. Coronary artery disease involving native coronary artery of native heart without angina pectoris  Stable.Continue asa and plavix      2. Chronic systolic heart failure (HCC)  Stable.Continue coreg and lisinopril and add aldactone 12.5 mg qd- bmp in a week      3. Pure hypercholesterolemia  Stable.Continue repatha      4. Primary hypertension  Stable.Continue coreg and lisinopril         Return in about 3 months (around 8/21/2025).         PHILIPPE BIRMINGHAM MD  5/21/2025  9:06 AM

## 2025-05-29 RX ORDER — LISINOPRIL 20 MG/1
20 TABLET ORAL DAILY
Qty: 90 TABLET | Refills: 3 | Status: SHIPPED | OUTPATIENT
Start: 2025-05-29

## 2025-05-29 RX ORDER — PANTOPRAZOLE SODIUM 40 MG/1
40 TABLET, DELAYED RELEASE ORAL DAILY
Qty: 90 TABLET | Refills: 3 | Status: SHIPPED | OUTPATIENT
Start: 2025-05-29

## 2025-05-29 NOTE — TELEPHONE ENCOUNTER
Requested Prescriptions     Pending Prescriptions Disp Refills    lisinopril (PRINIVIL;ZESTRIL) 20 MG tablet [Pharmacy Med Name: Lisinopril 20 MG Oral Tablet] 90 tablet 3     Sig: Take 1 tablet by mouth once daily    pantoprazole (PROTONIX) 40 MG tablet [Pharmacy Med Name: Pantoprazole Sodium 40 MG Oral Tablet Delayed Release] 90 tablet 3     Sig: Take 1 tablet by mouth once daily         Verified rx. Last OV 5/21/25. Erx to pharm on file.

## 2025-06-26 ENCOUNTER — OFFICE VISIT (OUTPATIENT)
Dept: VASCULAR SURGERY | Age: 70
End: 2025-06-26
Payer: MEDICARE

## 2025-06-26 VITALS
HEART RATE: 64 BPM | SYSTOLIC BLOOD PRESSURE: 134 MMHG | OXYGEN SATURATION: 92 % | DIASTOLIC BLOOD PRESSURE: 87 MMHG | HEIGHT: 72 IN | BODY MASS INDEX: 32.37 KG/M2 | WEIGHT: 239 LBS

## 2025-06-26 DIAGNOSIS — I73.9 PAD (PERIPHERAL ARTERY DISEASE): Primary | ICD-10-CM

## 2025-06-26 DIAGNOSIS — Z98.890 H/O ABDOMINAL AORTIC ANEURYSM REPAIR: ICD-10-CM

## 2025-06-26 DIAGNOSIS — I71.40 ABDOMINAL AORTIC ANEURYSM (AAA) WITHOUT RUPTURE, UNSPECIFIED PART: ICD-10-CM

## 2025-06-26 PROCEDURE — 3078F DIAST BP <80 MM HG: CPT | Performed by: NURSE PRACTITIONER

## 2025-06-26 PROCEDURE — G2211 COMPLEX E/M VISIT ADD ON: HCPCS | Performed by: NURSE PRACTITIONER

## 2025-06-26 PROCEDURE — 1159F MED LIST DOCD IN RCRD: CPT | Performed by: NURSE PRACTITIONER

## 2025-06-26 PROCEDURE — 3075F SYST BP GE 130 - 139MM HG: CPT | Performed by: NURSE PRACTITIONER

## 2025-06-26 PROCEDURE — 1123F ACP DISCUSS/DSCN MKR DOCD: CPT | Performed by: NURSE PRACTITIONER

## 2025-06-26 PROCEDURE — 99214 OFFICE O/P EST MOD 30 MIN: CPT | Performed by: NURSE PRACTITIONER

## 2025-06-26 PROCEDURE — 1036F TOBACCO NON-USER: CPT | Performed by: NURSE PRACTITIONER

## 2025-06-26 PROCEDURE — G8427 DOCREV CUR MEDS BY ELIG CLIN: HCPCS | Performed by: NURSE PRACTITIONER

## 2025-06-26 PROCEDURE — G8417 CALC BMI ABV UP PARAM F/U: HCPCS | Performed by: NURSE PRACTITIONER

## 2025-06-26 PROCEDURE — 3017F COLORECTAL CA SCREEN DOC REV: CPT | Performed by: NURSE PRACTITIONER

## 2025-06-26 RX ORDER — SEMAGLUTIDE 0.68 MG/ML
INJECTION, SOLUTION SUBCUTANEOUS
COMMUNITY
Start: 2025-06-02 | End: 2025-07-14

## 2025-06-26 NOTE — PROGRESS NOTES
DATE OF VISIT: 6/26/2025      [facility]  Mihai Ledesma is a 69 y.o. male who follows up for his arterial and aorta duplex studies. He denies any lifestyle limiting claudication, rest pain or open ulcerations. He does see pain management. He is taking Plavix, Crestor, ASA, Repatha, Zetia and fenofibrate. He has undergone aneurysm repair and bilateral fem-pop bypass grafts which have occluded. He is a previous smoker. He denies claudication.         MEDICAL HISTORY:   Past Medical History:   Diagnosis Date    AAA (abdominal aortic aneurysm) 9/23/2011 9/29/11 AORTIC ABDOMINAL ANUERYSM REPAIR ENDOVASCULAR ([facility])-  Monique Bui 4.6cm on US 9/23/11     Acute myocardial infarction (HCC) February, 2010    MI 2/10 - Quad bypass 2/25/10 ,4/2016    Anticoagulation monitoring, INR range 2-3 10/5/2011    Anxiety 11/3/2011    Arthritis 11/3/2011    CAD (coronary artery disease)     CAD (coronary artery disease), native coronary artery 2/24/2010    Cardiomyopathy ([facility]) 2/24/2010    Chest discomfort 06/16/15    Tightness, Pressure.  Jan 2014:  stress MPI with Lexiscan - normal perfusion, LVEF 52%    Chronic systolic heart failure ([facility]) 2/24/2010    Claudication 06/16/15    Claudication, symptom, pain relieved by rest    Congestive heart failure, unspecified     COPD (chronic obstructive pulmonary disease) ([facility]) 11/3/2011    Extremity atherosclerosis with resting pain ([facility]) 9/23/2011 9/28/11 ; THROMBECTOMY/EMBOLECTOMY LOWER EXTREMITY - LEFT POPLITEAL ARTERY EMBOLECTOMY- Dr. Bui     GERD (gastroesophageal reflux disease) 11/3/2011    History of AAA (abdominal aortic aneurysm) repair ???    including right femoral artery    Hypercholesterolemia     Hyperlipidemia 11/3/2011    Hypertension 11/3/2011    Popliteal artery aneurysm, bilateral 9/23/2011    L Pop A aneurysm stent graft 9/8/11- Dr. Bui R Pop A aneurysm repair      Psychiatric disorder     anxiety    PUD (peptic ulcer disease) ???    hx bleeding gastric

## 2025-07-17 RX ORDER — ROSUVASTATIN CALCIUM 40 MG/1
40 TABLET, COATED ORAL NIGHTLY
Qty: 90 TABLET | Refills: 1 | Status: SHIPPED | OUTPATIENT
Start: 2025-07-17

## 2025-07-17 NOTE — TELEPHONE ENCOUNTER
Prescription sent to pharmacy on file//derrick  Requested Prescriptions     Signed Prescriptions Disp Refills    rosuvastatin (CRESTOR) 40 MG tablet 90 tablet 1     Sig: TAKE 1 TABLET BY MOUTH ONCE DAILY IN THE EVENING     Authorizing Provider: PHILIPPE BIRMINGHAM     Ordering User: NEDA GIBBONS

## 2025-09-02 ENCOUNTER — CLINICAL SUPPORT (OUTPATIENT)
Age: 70
End: 2025-09-02

## 2025-09-02 ENCOUNTER — OFFICE VISIT (OUTPATIENT)
Age: 70
End: 2025-09-02
Payer: MEDICARE

## 2025-09-02 VITALS
HEIGHT: 72 IN | DIASTOLIC BLOOD PRESSURE: 86 MMHG | SYSTOLIC BLOOD PRESSURE: 138 MMHG | BODY MASS INDEX: 32.51 KG/M2 | WEIGHT: 240 LBS | HEART RATE: 64 BPM

## 2025-09-02 DIAGNOSIS — I42.0 DILATED CARDIOMYOPATHY (HCC): ICD-10-CM

## 2025-09-02 DIAGNOSIS — Z45.02 ICD (IMPLANTABLE CARDIOVERTER-DEFIBRILLATOR) DISCHARGE: ICD-10-CM

## 2025-09-02 DIAGNOSIS — Z95.1 S/P CABG (CORONARY ARTERY BYPASS GRAFT): Primary | ICD-10-CM

## 2025-09-02 DIAGNOSIS — I50.22 CHRONIC SYSTOLIC HEART FAILURE (HCC): Primary | ICD-10-CM

## 2025-09-02 DIAGNOSIS — E78.00 PURE HYPERCHOLESTEROLEMIA: ICD-10-CM

## 2025-09-02 DIAGNOSIS — M25.512 ACUTE PAIN OF LEFT SHOULDER: ICD-10-CM

## 2025-09-02 DIAGNOSIS — I50.22 CHRONIC SYSTOLIC HEART FAILURE (HCC): ICD-10-CM

## 2025-09-02 DIAGNOSIS — I10 PRIMARY HYPERTENSION: ICD-10-CM

## 2025-09-02 PROCEDURE — G8417 CALC BMI ABV UP PARAM F/U: HCPCS | Performed by: INTERNAL MEDICINE

## 2025-09-02 PROCEDURE — 1126F AMNT PAIN NOTED NONE PRSNT: CPT | Performed by: INTERNAL MEDICINE

## 2025-09-02 PROCEDURE — G8428 CUR MEDS NOT DOCUMENT: HCPCS | Performed by: INTERNAL MEDICINE

## 2025-09-02 PROCEDURE — 3017F COLORECTAL CA SCREEN DOC REV: CPT | Performed by: INTERNAL MEDICINE

## 2025-09-02 PROCEDURE — 3075F SYST BP GE 130 - 139MM HG: CPT | Performed by: INTERNAL MEDICINE

## 2025-09-02 PROCEDURE — 99214 OFFICE O/P EST MOD 30 MIN: CPT | Performed by: INTERNAL MEDICINE

## 2025-09-02 PROCEDURE — 1036F TOBACCO NON-USER: CPT | Performed by: INTERNAL MEDICINE

## 2025-09-02 PROCEDURE — 1123F ACP DISCUSS/DSCN MKR DOCD: CPT | Performed by: INTERNAL MEDICINE

## 2025-09-02 PROCEDURE — 3079F DIAST BP 80-89 MM HG: CPT | Performed by: INTERNAL MEDICINE

## 2025-09-02 RX ORDER — CARVEDILOL 6.25 MG/1
6.25 TABLET ORAL DAILY
Qty: 180 TABLET | Refills: 3 | Status: SHIPPED | OUTPATIENT
Start: 2025-09-02

## (undated) DEVICE — KENDALL SCD EXPRESS SLEEVES, KNEE LENGTH, MEDIUM: Brand: KENDALL SCD

## (undated) DEVICE — CATHETER DIAG AD 5FR L100CM COR GRY HYDRPHLC NYL MPA 2 W/ 2

## (undated) DEVICE — GLIDESHEATH SLENDER STAINLESS STEEL KIT: Brand: GLIDESHEATH SLENDER

## (undated) DEVICE — CATHETER DIAG AD 5FR L100CM COR GRY HYDRPHLC NYL IM W/O

## (undated) DEVICE — CATH BLLN ANGIO 3.25X27MM NC EUPHORIA RX

## (undated) DEVICE — BAND RADIAL COMPR ARTERY 27CM -- LNG BX/10

## (undated) DEVICE — GUIDEWIRE VASC J 3 CM 0.014 INX190 CM BAL MIDWT 1001780J

## (undated) DEVICE — CATHETER GUID AD 6FR L100CM COR PERIPH GRN NYL PTFE AL 1

## (undated) DEVICE — CATHETER DIAG AD 5FR L110CM 145DEG COR GRY HYDRPHLC NYL

## (undated) DEVICE — GUIDEWIRE VASC L260CM DIA0.035IN RAD 3MM J TIP L7CM PTFE

## (undated) DEVICE — GUIDEWIRE 035IN 210CM PTFE COAT FIX COR J TIP 15MM FIRM BODY

## (undated) DEVICE — CATHETER COR DIAG JUDKINS L 3.5 CRV 6FR 100CM 0 SIDE H

## (undated) DEVICE — CATH BLLN ANGIO 2X20MM SC EUPHORA RX

## (undated) DEVICE — TRAY PREP DRY W/ PREM GLV 2 APPL 6 SPNG 2 UNDPD 1 OVERWRAP

## (undated) DEVICE — CATHETER DIAG 6FR L100CM LUMN ID0.056IN JR4 CRV 0 SIDE H

## (undated) DEVICE — CATH BLLN ANGIO 2.50X20MM NC EUPHORIA RX

## (undated) DEVICE — CYSTO: Brand: MEDLINE INDUSTRIES, INC.

## (undated) DEVICE — RUNTHROUGH NS EXTRA FLOPPY PTCA GUIDEWIRE: Brand: RUNTHROUGH

## (undated) DEVICE — CATHETER GUID AD 6FR L100CM GRN NYL PTFE LCB W/O SIDE H

## (undated) DEVICE — INFLATION DEVICE KIT: Brand: ENCORE™ 26 ADVANTAGE KIT

## (undated) DEVICE — CATHETER COR DIAG JUDKINS R 5.0 CRV 6FR 100CM 0 SIDE H

## (undated) DEVICE — VALVE HEMSTAS W/ GWIRE INSRTN TOOL GRDIAN II NC

## (undated) DEVICE — CATH BLLN ANGIO 2.50X20MM SC EUPHORA RX

## (undated) DEVICE — GOWN,REINFORCED,POLY,AURORA,XXLARGE,STR: Brand: MEDLINE

## (undated) DEVICE — CATHETER ANGIO 5FR L100CM GRY S STL NYL JL3.5 3 SEG BRAID L

## (undated) DEVICE — Device: Brand: PROWATER

## (undated) DEVICE — PINNACLE TIF INTRODUCER SHEATH: Brand: PINNACLE

## (undated) DEVICE — Device

## (undated) DEVICE — DEVICE EMBOLIC PROTCT FLTR L4MM GWIRE L320/190CM DIA0.014IN
Type: IMPLANTABLE DEVICE | Status: NON-FUNCTIONAL
Removed: 2022-08-05

## (undated) DEVICE — COPILOT BLEEDBACK CONTROL VALVE: Brand: COPILOT

## (undated) DEVICE — CATH BLLN ANGIO 3X8MM NC EUPHORIA RX

## (undated) DEVICE — CATHETER GUID 6FR L100CM COR PERIPH BLU NYL COAT PTFE LNR 67027800] CARDINAL HEALTH CORDIS]

## (undated) DEVICE — BAND RADIAL COMPR ARTERY 24CM -- REG BX/10

## (undated) DEVICE — CATHETER ANGIO JL4 0.038 IN AD 6 FRX100 CM STD SUPER TORQUE

## (undated) DEVICE — CATHETER GUID XB 3.5 6 FRX100 CM VISTA BRT TIP

## (undated) DEVICE — GUIDE NDL ST W/ 14 X 147CM TELESCOPICALLY FLD CIV FLX CVR

## (undated) DEVICE — GUIDEWIRE ENDOSCP L150CM DIA0.038IN TIP L3CM PTFE FLX STR

## (undated) DEVICE — CATHETER GUID 6FR L100CM BLU PTFE JL3.5 TRUELUMEN HYBRID

## (undated) DEVICE — EP SPD2-US-040-320 SPIDER FX V04
Type: IMPLANTABLE DEVICE | Status: NON-FUNCTIONAL
Brand: SPIDERFX™
Removed: 2022-01-31

## (undated) DEVICE — CYSTO/BLADDER IRRIGATION SET, REGULATING CLAMP

## (undated) DEVICE — CATHETER DIAG 6FR L100CM SPEC CRV STYL LCB DXTERITY

## (undated) DEVICE — DEVICE COMPR LNG 27 CM VASC BND

## (undated) DEVICE — CATH BLLN ANGIO 3.25X15MM NC EUPHORIA RX

## (undated) DEVICE — SOLUTION IRRIG 1000ML H2O STRL BLT

## (undated) DEVICE — SOLUTION IRRIG 3000ML H2O STRL BAG

## (undated) DEVICE — CATHETER GUID 6FR L100CM GRN PTFE AL2 TRUELUMEN HYBRID

## (undated) DEVICE — CATHETER DIAG 6FR L110CM PIGTAILS CRV STYL PIG145 DXTERITY